# Patient Record
Sex: FEMALE | Race: BLACK OR AFRICAN AMERICAN | NOT HISPANIC OR LATINO | Employment: FULL TIME | URBAN - METROPOLITAN AREA
[De-identification: names, ages, dates, MRNs, and addresses within clinical notes are randomized per-mention and may not be internally consistent; named-entity substitution may affect disease eponyms.]

---

## 2017-01-09 ENCOUNTER — TELEPHONE (OUTPATIENT)
Dept: OPHTHALMOLOGY | Facility: CLINIC | Age: 37
End: 2017-01-09

## 2017-01-12 ENCOUNTER — ANESTHESIA (OUTPATIENT)
Dept: SURGERY | Facility: OTHER | Age: 37
End: 2017-01-12
Payer: COMMERCIAL

## 2017-01-12 ENCOUNTER — ANESTHESIA EVENT (OUTPATIENT)
Dept: SURGERY | Facility: OTHER | Age: 37
End: 2017-01-12
Payer: COMMERCIAL

## 2017-01-12 PROCEDURE — 25000003 PHARM REV CODE 250: Performed by: ANESTHESIOLOGY

## 2017-01-12 PROCEDURE — 25000003 PHARM REV CODE 250: Performed by: NURSE ANESTHETIST, CERTIFIED REGISTERED

## 2017-01-12 PROCEDURE — 63600175 PHARM REV CODE 636 W HCPCS: Performed by: NURSE ANESTHETIST, CERTIFIED REGISTERED

## 2017-01-12 RX ORDER — NEOSTIGMINE METHYLSULFATE 1 MG/ML
INJECTION, SOLUTION INTRAVENOUS
Status: DISCONTINUED | OUTPATIENT
Start: 2017-01-12 | End: 2017-01-12

## 2017-01-12 RX ORDER — GLYCOPYRROLATE 0.2 MG/ML
INJECTION INTRAMUSCULAR; INTRAVENOUS
Status: DISCONTINUED | OUTPATIENT
Start: 2017-01-12 | End: 2017-01-12

## 2017-01-12 RX ORDER — ROCURONIUM BROMIDE 10 MG/ML
INJECTION, SOLUTION INTRAVENOUS
Status: DISCONTINUED | OUTPATIENT
Start: 2017-01-12 | End: 2017-01-12

## 2017-01-12 RX ORDER — MIDAZOLAM HYDROCHLORIDE 1 MG/ML
INJECTION INTRAMUSCULAR; INTRAVENOUS
Status: DISCONTINUED | OUTPATIENT
Start: 2017-01-12 | End: 2017-01-12

## 2017-01-12 RX ORDER — LIDOCAINE HCL/PF 100 MG/5ML
SYRINGE (ML) INTRAVENOUS
Status: DISCONTINUED | OUTPATIENT
Start: 2017-01-12 | End: 2017-01-12

## 2017-01-12 RX ORDER — ONDANSETRON 2 MG/ML
INJECTION INTRAMUSCULAR; INTRAVENOUS
Status: DISCONTINUED | OUTPATIENT
Start: 2017-01-12 | End: 2017-01-12

## 2017-01-12 RX ORDER — FENTANYL CITRATE 50 UG/ML
INJECTION, SOLUTION INTRAMUSCULAR; INTRAVENOUS
Status: DISCONTINUED | OUTPATIENT
Start: 2017-01-12 | End: 2017-01-12

## 2017-01-12 RX ORDER — SODIUM CHLORIDE, SODIUM LACTATE, POTASSIUM CHLORIDE, CALCIUM CHLORIDE 600; 310; 30; 20 MG/100ML; MG/100ML; MG/100ML; MG/100ML
INJECTION, SOLUTION INTRAVENOUS CONTINUOUS PRN
Status: DISCONTINUED | OUTPATIENT
Start: 2017-01-12 | End: 2017-01-12

## 2017-01-12 RX ADMIN — SODIUM CHLORIDE, SODIUM LACTATE, POTASSIUM CHLORIDE, AND CALCIUM CHLORIDE: 600; 310; 30; 20 INJECTION, SOLUTION INTRAVENOUS at 08:01

## 2017-01-12 RX ADMIN — FENTANYL CITRATE 100 MCG: 50 INJECTION, SOLUTION INTRAMUSCULAR; INTRAVENOUS at 09:01

## 2017-01-12 RX ADMIN — NEOSTIGMINE METHYLSULFATE 5 MG: 1 INJECTION INTRAVENOUS at 09:01

## 2017-01-12 RX ADMIN — CARBOXYMETHYLCELLULOSE SODIUM 2 DROP: 2.5 SOLUTION/ DROPS OPHTHALMIC at 09:01

## 2017-01-12 RX ADMIN — LIDOCAINE HYDROCHLORIDE 75 MG: 20 INJECTION, SOLUTION INTRAVENOUS at 09:01

## 2017-01-12 RX ADMIN — ONDANSETRON 4 MG: 2 INJECTION INTRAMUSCULAR; INTRAVENOUS at 09:01

## 2017-01-12 RX ADMIN — MIDAZOLAM HYDROCHLORIDE 2 MG: 1 INJECTION, SOLUTION INTRAMUSCULAR; INTRAVENOUS at 09:01

## 2017-01-12 RX ADMIN — GLYCOPYRROLATE 0.8 MG: 0.2 INJECTION, SOLUTION INTRAMUSCULAR; INTRAVENOUS at 09:01

## 2017-01-12 RX ADMIN — ROCURONIUM BROMIDE 40 MG: 10 INJECTION, SOLUTION INTRAVENOUS at 09:01

## 2017-01-12 NOTE — ANESTHESIA PREPROCEDURE EVALUATION
01/12/2017  Zaida Chung is a 36 y.o., female.    [facility] Anesthesia Evaluation    I have reviewed the Patient Summary Reports.    I have reviewed the Nursing Notes.   I have reviewed the Medications.     Review of Systems  Anesthesia Hx:  History of prior surgery of interest to airway management or planning: Previous anesthesia: General 1/16 corneal tx with general anesthesia.  Denies Family Hx of Anesthesia complications.   Denies Personal Hx of Anesthesia complications.   Social:  Non-Smoker    Hematology/Oncology:  Hematology Normal   Oncology Normal     EENT/Dental:EENT/Dental Normal   Cardiovascular:  Cardiovascular Normal     Pulmonary:  Pulmonary Normal    Renal/:  Renal/ Normal     Hepatic/GI:   GERD 90# weight loss following bariatric surgery 2014   Musculoskeletal:  Musculoskeletal Normal    OB/GYN/PEDS:  Polycystic ovarian syndrome   Neurological:  Neurology Normal    Endocrine:  Endocrine Normal    Dermatological:  Skin Normal    Psych:  Psychiatric Normal           Physical Exam  General:  Morbid Obesity    Airway/Jaw/Neck:  Airway Findings: Mouth Opening: Normal Mallampati: II  TM Distance: Normal, at least 6 cm      Dental:  Dental Findings: In tact        Mental Status:  Mental Status Findings:  Cooperative, Alert and Oriented         Anesthesia Plan  Type of Anesthesia, risks & benefits discussed:  Anesthesia Type:  general  Patient's Preference: GA  Intra-op Monitoring Plan: standard ASA monitors  Intra-op Monitoring Plan Comments:   Post Op Pain Control Plan:   Post Op Pain Control Plan Comments:   Induction:    Beta Blocker:         Informed Consent: Patient understands risks and agrees with Anesthesia plan.  Questions answered. Anesthesia consent signed with patient.  ASA Score: 3     Day of Surgery Review of History & Physical:    H&P update referred to the surgeon.          Ready For Surgery From Anesthesia Perspective.

## 2017-01-12 NOTE — TRANSFER OF CARE
"Anesthesia Transfer of Care Note    Patient: Zaida Chung    Procedure(s) Performed: Procedure(s) (LRB):  TRANSPLANT-CORNEA (Right)    Patient location: PACU    Anesthesia Type: general    Transport from OR: Transported from OR on 2-3 L/min O2 by NC with adequate spontaneous ventilation    Post pain: adequate analgesia    Post assessment: no apparent anesthetic complications    Post vital signs: stable    Level of consciousness: awake, alert and oriented    Nausea/Vomiting: no nausea/vomiting    Complications: none          Last vitals:   Visit Vitals    BP (!) 147/86 (BP Location: Right arm, Patient Position: Sitting, BP Method: Automatic)    Pulse 64    Temp 37.2 °C (99 °F) (Oral)    Resp 18    Ht 5' 2" (1.575 m)    Wt 104.3 kg (230 lb)    LMP 12/15/2016    SpO2 96%    Breastfeeding No    BMI 42.07 kg/m2     "

## 2017-01-12 NOTE — ANESTHESIA POSTPROCEDURE EVALUATION
"Anesthesia Post Evaluation    Patient: Zaida Chung    Procedure(s) Performed: Procedure(s) (LRB):  TRANSPLANT-CORNEA (Right)    Final Anesthesia Type: general  Patient location during evaluation: PACU  Patient participation: Yes- Able to Participate  Level of consciousness: awake and alert  Post-procedure vital signs: reviewed and stable  Pain management: adequate  Airway patency: patent  PONV status at discharge: No PONV  Anesthetic complications: no      Cardiovascular status: blood pressure returned to baseline  Respiratory status: unassisted, spontaneous ventilation and room air  Hydration status: euvolemic  Follow-up not needed.        Visit Vitals    BP (!) 149/74 (BP Location: Right arm, Patient Position: Sitting, BP Method: Automatic)    Pulse (!) 59    Temp 36.8 °C (98.2 °F) (Oral)    Resp 16    Ht 5' 2" (1.575 m)    Wt 104.3 kg (230 lb)    LMP 12/15/2016    SpO2 96%    Breastfeeding No    BMI 42.07 kg/m2       Pain/Edgard Score: Pain Assessment Performed: Yes (1/12/2017 11:00 AM)  Presence of Pain: non-verbal indicators absent (1/12/2017 11:00 AM)  Pain Rating Prior to Med Admin: 4 (1/12/2017 10:26 AM)  Pain Rating Post Med Admin: 4 (1/12/2017 10:46 AM)  Edgard Score: 9 (1/12/2017 10:17 AM)  Modified Edgard Score: 18 (1/12/2017 10:33 AM)      "

## 2017-01-13 ENCOUNTER — OFFICE VISIT (OUTPATIENT)
Dept: OPHTHALMOLOGY | Facility: CLINIC | Age: 37
End: 2017-01-13
Payer: COMMERCIAL

## 2017-01-13 DIAGNOSIS — Z94.7 STATUS POST CORNEAL TRANSPLANT: ICD-10-CM

## 2017-01-13 DIAGNOSIS — Z98.890 POST-OPERATIVE STATE: Primary | ICD-10-CM

## 2017-01-13 PROCEDURE — 99999 PR PBB SHADOW E&M-EST. PATIENT-LVL II: CPT | Mod: PBBFAC,,, | Performed by: OPHTHALMOLOGY

## 2017-01-13 PROCEDURE — 99024 POSTOP FOLLOW-UP VISIT: CPT | Mod: S$GLB,,, | Performed by: OPHTHALMOLOGY

## 2017-01-13 NOTE — PROGRESS NOTES
HPI     Post-op Evaluation    Additional comments: POD 1 PKP OD           Comments   POD 1 PKP OD January 12, 2017    Patient presented to clinic with OD patched.  Patched removed at 8:30 AM.    Patch removal tolerated well.    No eye meds at this time; but patient has medications and is ready to   start regimen         Last edited by Kate Kelley on 1/13/2017  8:43 AM. (History)            Assessment /Plan     For exam results, see Encounter Report.    Post-operative state    Status post corneal transplant      POD1 PKP: Wound stable. Graft with normal edema, folds. Post operative precautions reviewed.

## 2017-01-13 NOTE — MR AVS SNAPSHOT
[facility] - Ophthalmology  [address]  [facility] [address]  Phone: [phone]  Fax: [phone]                  Zaida Chung   2017 8:15 AM   Office Visit    Description:  Female : 1980   Provider:  Elvia Sharp MD   Department:  [facility] - Ophthalmology           Reason for Visit     Post-op Evaluation           Diagnoses this Visit        Comments    Post-operative state    -  Primary     Status post corneal transplant                To Do List           Future Appointments        Provider Department Dept Phone    2017 9:45 AM Elvia Sharp MD [facility] Ophthalmology [phone]      Goals (5 Years of Data)     None      [facility] Line -  Assistance  Registered nurses in the [facility] provide clinical advisement, health education, appointment booking, and other advisory services.  Call for this free service at [phone].             Medications           Message regarding Medications     Verify the changes and/or additions to your medication regime listed below are the same as discussed with your clinician today.  If any of these changes or additions are incorrect, please notify your healthcare provider.             Verify that the below list of medications is an accurate representation of the medications you are currently taking.  If none reported, the list may be blank. If incorrect, please contact your healthcare provider. Carry this list with you in case of emergency.           Current Medications     famotidine (PEPCID) 10 MG tablet Take 10 mg by mouth 2 (two) times daily.    multivitamin capsule Take 1 capsule by mouth once daily.    prednisoLONE acetate (PRED FORTE) 1 % DrpS Place 1 drop into the left eye 4 (four) times daily.           Clinical Reference Information           Vital Signs - Last Recorded     LMP                   12/15/2016           Allergies as of 2017     Penicillins       Immunizations Administered on Date of Encounter - 1/13/2017     None

## 2017-01-20 ENCOUNTER — OFFICE VISIT (OUTPATIENT)
Dept: OPHTHALMOLOGY | Facility: CLINIC | Age: 37
End: 2017-01-20
Payer: COMMERCIAL

## 2017-01-20 DIAGNOSIS — Z94.7 STATUS POST CORNEAL TRANSPLANT: Primary | ICD-10-CM

## 2017-01-20 PROCEDURE — 99999 PR PBB SHADOW E&M-EST. PATIENT-LVL II: CPT | Mod: PBBFAC,,, | Performed by: OPHTHALMOLOGY

## 2017-01-20 PROCEDURE — 99024 POSTOP FOLLOW-UP VISIT: CPT | Mod: S$GLB,,, | Performed by: OPHTHALMOLOGY

## 2017-01-20 RX ORDER — MOXIFLOXACIN 5 MG/ML
1 SOLUTION/ DROPS OPHTHALMIC 3 TIMES DAILY
COMMUNITY
End: 2017-02-17 | Stop reason: ALTCHOICE

## 2017-01-20 NOTE — PROGRESS NOTES
HPI     Post-op Evaluation    Additional comments: cornea transplant od           Comments   PT PRESENTS FOR 1 WEEK POST OP K TRANSPLANT VISIT    PT STATES: doing well since sx, no pain, c/o light sensitivity   PATIENT DENIES FLASHES, FLOATERS, PAIN OR DIPLOPIA   PT GTTS: VIGAMOX TID-QID, PRED-FORTE TID-QID     PATIENT'S LAST DFE WAS 10/29/2015       Last edited by Coretta Louis, PCT on 1/20/2017 10:00 AM.   (History)            Assessment /Plan     For exam results, see Encounter Report.    Status post corneal transplant      PKP stable and clear. Signs and symptoms of graft rejection reviewed.  1 week

## 2017-01-20 NOTE — MR AVS SNAPSHOT
[facility] - [facility]  [address]  [facility] [address]  Phone: [phone]  Fax: [phone]                  Zaida Chung   2017 9:45 AM   Office Visit    Description:  Female : 1980   Provider:  Elvia Sharp MD   Department:  [facility]           Reason for Visit     Post-op Evaluation           Diagnoses this Visit        Comments    Status post corneal transplant    -  Primary            To Do List           Goals (5 Years of Data)     None      [facility]     [facility] Line -  Assistance  Registered nurses in the [facility] On Call Center provide clinical advisement, health education, appointment booking, and other advisory services.  Call for this free service at [phone].             Medications           Message regarding Medications     Verify the changes and/or additions to your medication regime listed below are the same as discussed with your clinician today.  If any of these changes or additions are incorrect, please notify your healthcare provider.             Verify that the below list of medications is an accurate representation of the medications you are currently taking.  If none reported, the list may be blank. If incorrect, please contact your healthcare provider. Carry this list with you in case of emergency.           Current Medications     famotidine (PEPCID) 10 MG tablet Take 10 mg by mouth 2 (two) times daily.    moxifloxacin (VIGAMOX) 0.5 % ophthalmic solution Place 1 drop into the right eye 3 (three) times daily.    multivitamin capsule Take 1 capsule by mouth once daily.    prednisoLONE acetate (PRED FORTE) 1 % DrpS Place 1 drop into the left eye 4 (four) times daily.           Clinical Reference Information           Vital Signs - Last Recorded     LMP                   12/15/2016           Allergies as of 2017     Penicillins      Immunizations Administered on Date of Encounter - 2017      None

## 2017-02-17 ENCOUNTER — OFFICE VISIT (OUTPATIENT)
Dept: OPHTHALMOLOGY | Facility: CLINIC | Age: 37
End: 2017-02-17
Payer: COMMERCIAL

## 2017-02-17 DIAGNOSIS — H18.603 KERATOCONUS, BILATERAL: Primary | ICD-10-CM

## 2017-02-17 DIAGNOSIS — Z94.7 STATUS POST CORNEAL TRANSPLANT: ICD-10-CM

## 2017-02-17 PROCEDURE — 99024 POSTOP FOLLOW-UP VISIT: CPT | Mod: S$GLB,,, | Performed by: OPHTHALMOLOGY

## 2017-02-17 PROCEDURE — 99999 PR PBB SHADOW E&M-EST. PATIENT-LVL II: CPT | Mod: PBBFAC,,, | Performed by: OPHTHALMOLOGY

## 2017-02-17 NOTE — PROGRESS NOTES
HPI     S/P PKP OD January 12, 2017    Patient states she has been doing well since her last visit.    Eye meds:  PF 3-4 x day           Last edited by Kate Kelley on 2/17/2017 11:26 AM.         Assessment /Plan     For exam results, see Encounter Report.    Keratoconus, bilateral    Status post corneal transplant      PKP stable and clear. Signs and symptoms of graft rejection reviewed.  1 month OD    OS PKP stable and clear. Signs and symptoms of graft rejection reviewed.  1 year

## 2017-02-17 NOTE — MR AVS SNAPSHOT
[facility] - Ophthalmology  [address]  [facility] [address]  Phone: [phone]  Fax: [phone]                  Zaida Chung   2017 10:45 AM   Office Visit    Description:  Female : 1980   Provider:  Elvia Sharp MD   Department:  [facility] - Ophthalmology           Reason for Visit     Post-op Evaluation           Diagnoses this Visit        Comments    Keratoconus, bilateral    -  Primary     Status post corneal transplant                To Do List           Goals (5 Years of Data)     None      [facility]     [facility] -  Assistance  Registered nurses in the [facility] On Call Center provide clinical advisement, health education, appointment booking, and other advisory services.  Call for this free service at [phone].             Medications           Message regarding Medications     Verify the changes and/or additions to your medication regime listed below are the same as discussed with your clinician today.  If any of these changes or additions are incorrect, please notify your healthcare provider.        STOP taking these medications     moxifloxacin (VIGAMOX) 0.5 % ophthalmic solution Place 1 drop into the right eye 3 (three) times daily.           Verify that the below list of medications is an accurate representation of the medications you are currently taking.  If none reported, the list may be blank. If incorrect, please contact your healthcare provider. Carry this list with you in case of emergency.           Current Medications     famotidine (PEPCID) 10 MG tablet Take 10 mg by mouth 2 (two) times daily.    multivitamin capsule Take 1 capsule by mouth once daily.    prednisoLONE acetate (PRED FORTE) 1 % DrpS Place 1 drop into the left eye 4 (four) times daily.           Clinical Reference Information           Allergies as of 2017     Penicillins      Immunizations Administered on Date of Encounter - 2017      None      Language Assistance Services     ATTENTION: Language assistance services are available, free of charge. Please call [phone].      ATENCIÓN: Si habla chang, tiene a minor disposición servicios gratuitos de asistencia lingüística. Llame al [phone].     CHÚ Ý: N?u b?n nói Ti?ng Vi?t, có các d?ch v? h? tr? ngôn ng? mi?n phí dành cho b?n. G?i s? [phone].         [facility] complies with applicable Federal civil rights laws and does not discriminate on the basis of race, color, national origin, age, disability, or sex.

## 2017-08-30 ENCOUNTER — OFFICE VISIT (OUTPATIENT)
Dept: OBSTETRICS AND GYNECOLOGY | Facility: CLINIC | Age: 37
End: 2017-08-30
Payer: COMMERCIAL

## 2017-08-30 VITALS
DIASTOLIC BLOOD PRESSURE: 84 MMHG | SYSTOLIC BLOOD PRESSURE: 128 MMHG | WEIGHT: 271.63 LBS | BODY MASS INDEX: 49.68 KG/M2

## 2017-08-30 DIAGNOSIS — Z01.419 ROUTINE GYNECOLOGICAL EXAMINATION: Primary | ICD-10-CM

## 2017-08-30 DIAGNOSIS — E28.2 PCOS (POLYCYSTIC OVARIAN SYNDROME): ICD-10-CM

## 2017-08-30 DIAGNOSIS — N76.0 ACUTE VAGINITIS: ICD-10-CM

## 2017-08-30 DIAGNOSIS — Z80.3 FAMILY HISTORY OF BREAST CANCER: ICD-10-CM

## 2017-08-30 DIAGNOSIS — Z12.4 CERVICAL CANCER SCREENING: ICD-10-CM

## 2017-08-30 DIAGNOSIS — N93.9 ABNORMAL UTERINE BLEEDING (AUB): ICD-10-CM

## 2017-08-30 PROCEDURE — 58100 BIOPSY OF UTERUS LINING: CPT | Mod: S$GLB,,, | Performed by: OBSTETRICS & GYNECOLOGY

## 2017-08-30 PROCEDURE — 87480 CANDIDA DNA DIR PROBE: CPT

## 2017-08-30 PROCEDURE — 88305 TISSUE EXAM BY PATHOLOGIST: CPT | Mod: 26,,, | Performed by: PATHOLOGY

## 2017-08-30 PROCEDURE — 88175 CYTOPATH C/V AUTO FLUID REDO: CPT

## 2017-08-30 PROCEDURE — 99395 PREV VISIT EST AGE 18-39: CPT | Mod: 25,S$GLB,, | Performed by: OBSTETRICS & GYNECOLOGY

## 2017-08-30 PROCEDURE — 88305 TISSUE EXAM BY PATHOLOGIST: CPT | Performed by: PATHOLOGY

## 2017-08-30 PROCEDURE — 99999 PR PBB SHADOW E&M-EST. PATIENT-LVL III: CPT | Mod: PBBFAC,,, | Performed by: OBSTETRICS & GYNECOLOGY

## 2017-08-30 PROCEDURE — 87624 HPV HI-RISK TYP POOLED RSLT: CPT

## 2017-08-30 PROCEDURE — 87660 TRICHOMONAS VAGIN DIR PROBE: CPT

## 2017-08-30 RX ORDER — METRONIDAZOLE 500 MG/1
TABLET ORAL
Qty: 30 TABLET | Refills: 1 | Status: SHIPPED | OUTPATIENT
Start: 2017-08-30 | End: 2018-05-04

## 2017-08-30 NOTE — PROGRESS NOTES
37 yo  female who presents for well woman exam. Patient has known history of PCOS.  She is s/p gastric sleeve surgery a few years ago. Has lost a lot of weight.  Recently  in 2015.  Sexual partner is female.    Patient and her partner desire fertility. They have started care with [facility].  Patient instructed that she would need EMS today (given abnormal menstrual cycles).  She was encouraged by [facility] to have HSG to check for tubal patency.  Patient and partner are considering IUI right now.  Reports recent STD testing showed positive trichomonas (possibly from urine sample). All other testing was negative.    Patient concerned that she will continue to have irregular cycles and be unable to conceive without using clomid.   Reports strong family h/o breast cancer.         ROS:  GENERAL: Denies weight gain or weight loss. Feeling well overall.   SKIN: Denies rash or lesions.   HEAD: Denies head injury or headache.   CHEST: Denies chest pain or shortness of breath.   CARDIOVASCULAR: Denies palpitations or left sided chest pain.   ABDOMEN: No abdominal pain, constipation, diarrhea, nausea, vomiting or rectal bleeding.   URINARY: No frequency, dysuria, hematuria, or burning on urination.  REPRODUCTIVE: See HPI.   BREASTS:  denies pain, lumps, or nipple discharge.   HEMATOLOGIC: No easy bruisability or excessive bleeding.   MUSCULOSKELETAL: Denies joint pain or swelling.   NEUROLOGIC: Denies syncope or weakness.   PSYCHIATRIC: Denies depression, anxiety or mood swings.         PE:   Vitals: /84   Wt 123.2 kg (271 lb 9.7 oz)   LMP 2017 (Exact Date)   BMI 49.68 kg/m²   APPEARANCE: Well nourished, well developed, in no acute distress.  SKIN: Normal skin turgor, no lesions.  CHEST: Lungs clear to auscultation.  HEART: Regular rate and rhythm, no murmurs, rubs or gallops.  ABDOMEN: Soft. No tenderness or masses. No hepatosplenomegaly. No hernias. Obese.  BREASTS: Very large breasts;  Symmetrical, no skin changes or visible lesions. No palpable masses, nipple discharge or adenopathy bilaterally.  PELVIC: Normal external female genitalia without lesions. Normal hair distribution. Adequate perineal body, normal urethral meatus. Vagina moist and well rugated without lesions or discharge. Cervix pink and without lesions. No significant cystocele or rectocele. Bimanual exam showed uterus normal size, shape, position, mobile and nontender. Adnexa without masses or tenderness. Urethra and bladder normal.  RECTAL: Rectovaginal exam confirms above with normal sphincter tone, no masses, and heme negative.  EXTREMITIES: No clubbing cyanosis or edema.    Date:2017  Time:4:06 PM  Name of the procedure: Endometrial Biopsy  Indications: Zaida Chung is a 36 y.o. female  who presents today for endometrial biopsy secondary to abnormal uterine bleeding  Patient's last menstrual period was 2017 (exact date)..    Patient consent: Risks/benefits of the procedure were discussed with the patient. Patient's questions were answered.  Consents signed.   TIME OUT completed.  Labs:  Pap collected  Procedure: Speculum placed in vagina; Pap collected;  AFFIRM collected; cervix swabbed with betadine x 2;. Endometrial pipelle advanced without difficulty x 3 passes; uterus sounded to 10 cm.  Hemostasis achieved.  Complications: none      AP  Routine gyn  -s/p normal breast exam: mammogram ordered (given family h/o breast cancer)  -s/p normal pelvic exam:   -Pap and HPV: ordered  -STD testing: affirm collected; rx for flagyl provided to patient and partner  -infertility: order for HSG ordered  -AUB: endometrial biopsy completed    Patient will likely need records sent to [facility] once they are made available    Encouraged to contact my office if she does not get a cycle. May need rx for ruddy Dumont MD

## 2017-08-31 LAB
CANDIDA RRNA VAG QL PROBE: NEGATIVE
G VAGINALIS RRNA GENITAL QL PROBE: POSITIVE
T VAGINALIS RRNA GENITAL QL PROBE: POSITIVE

## 2017-09-02 ENCOUNTER — HOSPITAL ENCOUNTER (OUTPATIENT)
Dept: RADIOLOGY | Facility: HOSPITAL | Age: 37
Discharge: HOME | End: 2017-09-02
Attending: OBSTETRICS & GYNECOLOGY
Payer: COMMERCIAL

## 2017-09-02 VITALS — WEIGHT: 271 LBS | HEIGHT: 62 IN | BODY MASS INDEX: 49.87 KG/M2

## 2017-09-02 DIAGNOSIS — Z80.3 FAMILY HISTORY OF BREAST CANCER: ICD-10-CM

## 2017-09-02 PROCEDURE — 77063 BREAST TOMOSYNTHESIS BI: CPT | Mod: 26,,, | Performed by: RADIOLOGY

## 2017-09-02 PROCEDURE — 77067 SCR MAMMO BI INCL CAD: CPT | Mod: TC

## 2017-09-02 PROCEDURE — 77067 SCR MAMMO BI INCL CAD: CPT | Mod: 26,,, | Performed by: RADIOLOGY

## 2017-09-05 ENCOUNTER — TELEPHONE (OUTPATIENT)
Dept: RADIOLOGY | Facility: HOSPITAL | Age: 37
End: 2017-09-05

## 2017-09-07 LAB
HPV HR 12 DNA CVX QL NAA+PROBE: NEGATIVE
HPV16 DNA SPEC QL NAA+PROBE: NEGATIVE
HPV18 DNA SPEC QL NAA+PROBE: NEGATIVE

## 2017-09-13 ENCOUNTER — HOSPITAL ENCOUNTER (OUTPATIENT)
Dept: RADIOLOGY | Facility: HOSPITAL | Age: 37
Discharge: HOME | End: 2017-09-13
Attending: OBSTETRICS & GYNECOLOGY
Payer: COMMERCIAL

## 2017-09-13 ENCOUNTER — TELEPHONE (OUTPATIENT)
Dept: OBSTETRICS AND GYNECOLOGY | Facility: CLINIC | Age: 37
End: 2017-09-13

## 2017-09-13 DIAGNOSIS — R92.8 ABNORMAL MAMMOGRAM: ICD-10-CM

## 2017-09-13 PROCEDURE — 77061 BREAST TOMOSYNTHESIS UNI: CPT | Mod: 26,,, | Performed by: RADIOLOGY

## 2017-09-13 PROCEDURE — 77065 DX MAMMO INCL CAD UNI: CPT | Mod: 26,,, | Performed by: RADIOLOGY

## 2017-09-13 PROCEDURE — 77061 BREAST TOMOSYNTHESIS UNI: CPT | Mod: TC

## 2017-09-13 NOTE — TELEPHONE ENCOUNTER
[facility] called requesting a new order for her HSG test, because of her incorrect spelling on her name/Ins. Cards.

## 2017-09-13 NOTE — TELEPHONE ENCOUNTER
Pt. Notified her pap and HPV normal, her EMB was also normal , no cancer cells detected.  She said thank you so much for calling.

## 2017-12-06 ENCOUNTER — OFFICE VISIT (OUTPATIENT)
Dept: OPTOMETRY | Facility: CLINIC | Age: 37
End: 2017-12-06
Payer: COMMERCIAL

## 2017-12-06 DIAGNOSIS — Z94.7 CORNEA REPLACED BY TRANSPLANT: ICD-10-CM

## 2017-12-06 DIAGNOSIS — H18.603 KERATOCONUS OF BOTH EYES: ICD-10-CM

## 2017-12-06 DIAGNOSIS — T86.8409 REJECTION OF CORNEAL GRAFT: Primary | ICD-10-CM

## 2017-12-06 PROCEDURE — 67515 INJECT/TREAT EYE SOCKET: CPT | Mod: LT,S$GLB,, | Performed by: OPHTHALMOLOGY

## 2017-12-06 PROCEDURE — 99999 PR PBB SHADOW E&M-EST. PATIENT-LVL II: CPT | Mod: PBBFAC,,, | Performed by: OPTOMETRIST

## 2017-12-06 PROCEDURE — 92012 INTRM OPH EXAM EST PATIENT: CPT | Mod: S$GLB,,, | Performed by: OPTOMETRIST

## 2017-12-06 PROCEDURE — 92012 INTRM OPH EXAM EST PATIENT: CPT | Mod: 25,S$GLB,, | Performed by: OPHTHALMOLOGY

## 2017-12-06 RX ORDER — PREDNISOLONE ACETATE 10 MG/ML
1 SUSPENSION/ DROPS OPHTHALMIC 4 TIMES DAILY
Qty: 10 ML | Refills: 3 | Status: ON HOLD | OUTPATIENT
Start: 2017-12-06 | End: 2018-05-10 | Stop reason: SDUPTHER

## 2017-12-06 NOTE — PROGRESS NOTES
HPI      is here today for possible infection OS, pt sts lens is   extremely Cloudy OS pt vision is extremely blurry  With discharge   (-)Flashes (-)Floaters  (+)Itch, (+)tear, (+)burn, (-)Dryness. (+) OTC Drops steriods and mem 128  (-)Photophobia  (-)Glare (-)diplopia (-) headaches          Last edited by Felisa Castillo PCT on 12/6/2017 11:01 AM. (History)            Assessment /Plan     For exam results, see Encounter Report.    Rejection of corneal graft    Cornea replaced by transplant    Keratoconus of both eyes      Rejection episode #2   PKP in 1/2016, rejection 10/2016 and now 12/2017  Kenalog inj today, PF q2hrs    OD PKP stable and clear, PF qid.

## 2018-01-03 ENCOUNTER — OFFICE VISIT (OUTPATIENT)
Dept: OPHTHALMOLOGY | Facility: CLINIC | Age: 38
End: 2018-01-03
Payer: COMMERCIAL

## 2018-01-03 DIAGNOSIS — T86.8409 REJECTION OF CORNEAL GRAFT: Primary | ICD-10-CM

## 2018-01-03 PROCEDURE — 99999 PR PBB SHADOW E&M-EST. PATIENT-LVL II: CPT | Mod: PBBFAC,,, | Performed by: OPHTHALMOLOGY

## 2018-01-03 PROCEDURE — 92014 COMPRE OPH EXAM EST PT 1/>: CPT | Mod: S$GLB,,, | Performed by: OPHTHALMOLOGY

## 2018-01-03 NOTE — PROGRESS NOTES
HPI     DLS 12/6/17    Pt states she has had some improvement in her VA since the injection.  No   pain.    Eye meds:  PF Q 2 hrs    Last edited by Shyanne De Anda on 1/3/2018  3:46 PM. (History)            Assessment /Plan     For exam results, see Encounter Report.    Rejection of corneal graft      Rejection episode #2   PKP in 1/2016, rejection 10/2016 and now 12/2017  Kenalog inj 1 month ago, PF q2hrs  Much improved, cont PF q2-4hrs  IOP     OD PKP stable and clear, PF qid.

## 2018-02-09 ENCOUNTER — OFFICE VISIT (OUTPATIENT)
Dept: OPHTHALMOLOGY | Facility: CLINIC | Age: 38
End: 2018-02-09
Payer: COMMERCIAL

## 2018-02-09 DIAGNOSIS — H18.603 KERATOCONUS, BILATERAL: ICD-10-CM

## 2018-02-09 DIAGNOSIS — T86.8409 REJECTION OF CORNEAL GRAFT: Primary | ICD-10-CM

## 2018-02-09 PROCEDURE — 92014 COMPRE OPH EXAM EST PT 1/>: CPT | Mod: S$GLB,,, | Performed by: OPHTHALMOLOGY

## 2018-02-09 PROCEDURE — 99999 PR PBB SHADOW E&M-EST. PATIENT-LVL II: CPT | Mod: PBBFAC,,, | Performed by: OPHTHALMOLOGY

## 2018-02-09 RX ORDER — TIMOLOL MALEATE 5 MG/ML
1 SOLUTION/ DROPS OPHTHALMIC 2 TIMES DAILY
Qty: 10 ML | Refills: 6 | Status: ON HOLD | OUTPATIENT
Start: 2018-02-09 | End: 2018-05-10

## 2018-02-09 NOTE — PROGRESS NOTES
HPI     Patient is here today for a follow-up PKP OS 1/12/2017 reinjection on   1/8/2018.  Patient states she is doing well, still some cloudy vision OS   PF OU QID    Last edited by Tatiana Sadler, PCT on 2/9/2018 10:04 AM. (History)            Assessment /Plan     For exam results, see Encounter Report.    Rejection of corneal graft    Keratoconus, bilateral      Rejection episode #2   PKP in 1/2016, rejection 10/2016 and now 12/2017  Kenalog inj 2 months ago, PF q2hrs  Much improved, but now with persistent K edema in graft.  IOP high today, so start timolol  Plan DSEK OS  Under PKP  Fertility considerations. Can be done with MAC and RBB     OD PKP stable and clear, PF qid.

## 2018-02-21 ENCOUNTER — TELEPHONE (OUTPATIENT)
Dept: OPHTHALMOLOGY | Facility: CLINIC | Age: 38
End: 2018-02-21

## 2018-04-02 ENCOUNTER — TELEPHONE (OUTPATIENT)
Dept: OPHTHALMOLOGY | Facility: CLINIC | Age: 38
End: 2018-04-02

## 2018-04-02 DIAGNOSIS — T86.8409 CORNEAL GRAFT REJECTION: Primary | ICD-10-CM

## 2018-05-03 DIAGNOSIS — Z94.7 STATUS POST CORNEAL TRANSPLANT: ICD-10-CM

## 2018-05-03 RX ORDER — OFLOXACIN 3 MG/ML
1 SOLUTION/ DROPS OPHTHALMIC 4 TIMES DAILY
Qty: 5 ML | Refills: 0 | Status: SHIPPED | OUTPATIENT
Start: 2018-05-03 | End: 2018-05-13

## 2018-05-03 RX ORDER — PREDNISOLONE ACETATE 10 MG/ML
1 SUSPENSION/ DROPS OPHTHALMIC 4 TIMES DAILY
Qty: 10 ML | Refills: 4 | Status: SHIPPED | OUTPATIENT
Start: 2018-05-03 | End: 2018-11-07

## 2018-05-07 ENCOUNTER — TELEPHONE (OUTPATIENT)
Dept: OPHTHALMOLOGY | Facility: CLINIC | Age: 38
End: 2018-05-07

## 2018-05-10 ENCOUNTER — SURGERY (OUTPATIENT)
Age: 38
End: 2018-05-10

## 2018-05-10 ENCOUNTER — ANESTHESIA (OUTPATIENT)
Dept: SURGERY | Facility: OTHER | Age: 38
End: 2018-05-10
Payer: COMMERCIAL

## 2018-05-10 ENCOUNTER — ANESTHESIA EVENT (OUTPATIENT)
Dept: SURGERY | Facility: OTHER | Age: 38
End: 2018-05-10
Payer: COMMERCIAL

## 2018-05-10 ENCOUNTER — HOSPITAL ENCOUNTER (OUTPATIENT)
Facility: OTHER | Age: 38
Discharge: HOME | End: 2018-05-10
Attending: OPHTHALMOLOGY | Admitting: OPHTHALMOLOGY
Payer: COMMERCIAL

## 2018-05-10 VITALS
HEIGHT: 63 IN | DIASTOLIC BLOOD PRESSURE: 75 MMHG | WEIGHT: 260 LBS | HEART RATE: 60 BPM | TEMPERATURE: 98 F | BODY MASS INDEX: 46.07 KG/M2 | SYSTOLIC BLOOD PRESSURE: 134 MMHG | OXYGEN SATURATION: 99 % | RESPIRATION RATE: 16 BRPM

## 2018-05-10 DIAGNOSIS — T86.8409 CORNEAL GRAFT REJECTION: ICD-10-CM

## 2018-05-10 DIAGNOSIS — Z94.7 HISTORY OF CORNEAL TRANSPLANT: Primary | ICD-10-CM

## 2018-05-10 DIAGNOSIS — H18.603 KERATOCONUS OF BOTH EYES: ICD-10-CM

## 2018-05-10 LAB
B-HCG UR QL: NEGATIVE
CTP QC/QA: YES

## 2018-05-10 PROCEDURE — 37000008 HC ANESTHESIA 1ST 15 MINUTES: Performed by: OPHTHALMOLOGY

## 2018-05-10 PROCEDURE — 81025 URINE PREGNANCY TEST: CPT | Performed by: OPHTHALMOLOGY

## 2018-05-10 PROCEDURE — S0020 INJECTION, BUPIVICAINE HYDRO: HCPCS | Performed by: OPHTHALMOLOGY

## 2018-05-10 PROCEDURE — 25000003 PHARM REV CODE 250: Performed by: NURSE ANESTHETIST, CERTIFIED REGISTERED

## 2018-05-10 PROCEDURE — 36000706: Performed by: OPHTHALMOLOGY

## 2018-05-10 PROCEDURE — 63600175 PHARM REV CODE 636 W HCPCS: Performed by: OPHTHALMOLOGY

## 2018-05-10 PROCEDURE — 25000003 PHARM REV CODE 250: Performed by: ANESTHESIOLOGY

## 2018-05-10 PROCEDURE — 25000003 PHARM REV CODE 250: Performed by: OPHTHALMOLOGY

## 2018-05-10 PROCEDURE — 37000009 HC ANESTHESIA EA ADD 15 MINS: Performed by: OPHTHALMOLOGY

## 2018-05-10 PROCEDURE — 36000707: Performed by: OPHTHALMOLOGY

## 2018-05-10 PROCEDURE — V2785 CORNEAL TISSUE PROCESSING: HCPCS | Performed by: OPHTHALMOLOGY

## 2018-05-10 PROCEDURE — 65756 CORNEAL TRNSPL ENDOTHELIAL: CPT | Mod: LT,,, | Performed by: OPHTHALMOLOGY

## 2018-05-10 PROCEDURE — 71000016 HC POSTOP RECOV ADDL HR: Performed by: OPHTHALMOLOGY

## 2018-05-10 PROCEDURE — 65757 PREP CORNEAL ENDO ALLOGRAFT: CPT | Mod: ,,, | Performed by: OPHTHALMOLOGY

## 2018-05-10 PROCEDURE — 63600175 PHARM REV CODE 636 W HCPCS: Performed by: NURSE ANESTHETIST, CERTIFIED REGISTERED

## 2018-05-10 PROCEDURE — 71000015 HC POSTOP RECOV 1ST HR: Performed by: OPHTHALMOLOGY

## 2018-05-10 DEVICE — CORNEA TRANSPLANTABLE PRE CUT: Type: IMPLANTABLE DEVICE | Site: EYE | Status: FUNCTIONAL

## 2018-05-10 RX ORDER — SODIUM CHLORIDE 9 MG/ML
INJECTION, SOLUTION INTRAVENOUS CONTINUOUS PRN
Status: DISCONTINUED | OUTPATIENT
Start: 2018-05-10 | End: 2018-05-10

## 2018-05-10 RX ORDER — FENTANYL CITRATE 50 UG/ML
INJECTION, SOLUTION INTRAMUSCULAR; INTRAVENOUS
Status: DISCONTINUED | OUTPATIENT
Start: 2018-05-10 | End: 2018-05-10

## 2018-05-10 RX ORDER — PROPARACAINE HYDROCHLORIDE 5 MG/ML
1 SOLUTION/ DROPS OPHTHALMIC
Status: DISCONTINUED | OUTPATIENT
Start: 2018-05-10 | End: 2018-05-10 | Stop reason: HOSPADM

## 2018-05-10 RX ORDER — MOXIFLOXACIN 5 MG/ML
1 SOLUTION/ DROPS OPHTHALMIC
Status: COMPLETED | OUTPATIENT
Start: 2018-05-10 | End: 2018-05-10

## 2018-05-10 RX ORDER — MOXIFLOXACIN 5 MG/ML
1 SOLUTION/ DROPS OPHTHALMIC
Status: DISCONTINUED | OUTPATIENT
Start: 2018-05-10 | End: 2018-05-10 | Stop reason: HOSPADM

## 2018-05-10 RX ORDER — TETRACAINE HYDROCHLORIDE 5 MG/ML
1 SOLUTION OPHTHALMIC
Status: COMPLETED | OUTPATIENT
Start: 2018-05-10 | End: 2018-05-10

## 2018-05-10 RX ORDER — TRIAMCINOLONE ACETONIDE 40 MG/ML
INJECTION, SUSPENSION INTRA-ARTICULAR; INTRAMUSCULAR
Status: DISCONTINUED | OUTPATIENT
Start: 2018-05-10 | End: 2018-05-10 | Stop reason: HOSPADM

## 2018-05-10 RX ORDER — BUPIVACAINE HYDROCHLORIDE 7.5 MG/ML
INJECTION, SOLUTION EPIDURAL; RETROBULBAR
Status: DISCONTINUED | OUTPATIENT
Start: 2018-05-10 | End: 2018-05-10 | Stop reason: HOSPADM

## 2018-05-10 RX ORDER — LIDOCAINE HYDROCHLORIDE 20 MG/ML
INJECTION, SOLUTION INFILTRATION; PERINEURAL
Status: DISCONTINUED | OUTPATIENT
Start: 2018-05-10 | End: 2018-05-10 | Stop reason: HOSPADM

## 2018-05-10 RX ORDER — MIDAZOLAM HYDROCHLORIDE 1 MG/ML
INJECTION INTRAMUSCULAR; INTRAVENOUS
Status: DISCONTINUED | OUTPATIENT
Start: 2018-05-10 | End: 2018-05-10

## 2018-05-10 RX ORDER — ACETAMINOPHEN 325 MG/1
650 TABLET ORAL EVERY 4 HOURS PRN
Status: DISCONTINUED | OUTPATIENT
Start: 2018-05-10 | End: 2018-05-10 | Stop reason: HOSPADM

## 2018-05-10 RX ORDER — DEXAMETHASONE SODIUM PHOSPHATE 4 MG/ML
INJECTION, SOLUTION INTRA-ARTICULAR; INTRALESIONAL; INTRAMUSCULAR; INTRAVENOUS; SOFT TISSUE
Status: DISCONTINUED | OUTPATIENT
Start: 2018-05-10 | End: 2018-05-10 | Stop reason: HOSPADM

## 2018-05-10 RX ORDER — FAMOTIDINE 20 MG/1
20 TABLET, FILM COATED ORAL ONCE
Status: COMPLETED | OUTPATIENT
Start: 2018-05-10 | End: 2018-05-10

## 2018-05-10 RX ORDER — GENTAMICIN SULFATE 40 MG/ML
INJECTION, SOLUTION INTRAMUSCULAR; INTRAVENOUS
Status: DISCONTINUED | OUTPATIENT
Start: 2018-05-10 | End: 2018-05-10 | Stop reason: HOSPADM

## 2018-05-10 RX ADMIN — MOXIFLOXACIN HYDROCHLORIDE 1 DROP: 5 SOLUTION/ DROPS OPHTHALMIC at 09:05

## 2018-05-10 RX ADMIN — SODIUM CHLORIDE: 0.9 INJECTION, SOLUTION INTRAVENOUS at 10:05

## 2018-05-10 RX ADMIN — TRIAMCINOLONE ACETONIDE 40 MG: 40 INJECTION, SUSPENSION INTRA-ARTICULAR; INTRAMUSCULAR at 11:05

## 2018-05-10 RX ADMIN — HOMATROPINE HYDROBROMIDE 5 DROP: 50 SOLUTION OPHTHALMIC at 11:05

## 2018-05-10 RX ADMIN — BALANCED SALT SOLUTION 500 ML: 6.4; .75; .48; .3; 3.9; 1.7 SOLUTION OPHTHALMIC at 11:05

## 2018-05-10 RX ADMIN — FAMOTIDINE 20 MG: 20 TABLET ORAL at 12:05

## 2018-05-10 RX ADMIN — SODIUM HYALURONATE 10 MG: 10 INJECTION INTRAOCULAR at 11:05

## 2018-05-10 RX ADMIN — BUPIVACAINE HYDROCHLORIDE 5 ML: 7.5 INJECTION, SOLUTION EPIDURAL; RETROBULBAR at 11:05

## 2018-05-10 RX ADMIN — MIDAZOLAM HYDROCHLORIDE 1 MG: 1 INJECTION, SOLUTION INTRAMUSCULAR; INTRAVENOUS at 11:05

## 2018-05-10 RX ADMIN — TETRACAINE HYDROCHLORIDE 1 DROP: 5 SOLUTION OPHTHALMIC at 09:05

## 2018-05-10 RX ADMIN — GENTAMICIN SULFATE 20 MG: 40 INJECTION, SOLUTION INTRAMUSCULAR; INTRAVENOUS at 11:05

## 2018-05-10 RX ADMIN — DEXAMETHASONE SODIUM PHOSPHATE 2 MG: 4 INJECTION, SOLUTION INTRAMUSCULAR; INTRAVENOUS at 11:05

## 2018-05-10 RX ADMIN — LIDOCAINE HYDROCHLORIDE 5 ML: 20 INJECTION, SOLUTION INFILTRATION; PERINEURAL at 11:05

## 2018-05-10 RX ADMIN — HOMATROPINE HYDROBROMIDE 6 DROP: 50 SOLUTION OPHTHALMIC at 11:05

## 2018-05-10 RX ADMIN — FENTANYL CITRATE 100 MCG: 50 INJECTION, SOLUTION INTRAMUSCULAR; INTRAVENOUS at 11:05

## 2018-05-10 RX ADMIN — MIDAZOLAM HYDROCHLORIDE 2 MG: 1 INJECTION, SOLUTION INTRAMUSCULAR; INTRAVENOUS at 10:05

## 2018-05-10 NOTE — OP NOTE
SURGEON:  Elvia Sharp M.D.    PREOPERATIVE DIAGNOSES:  Failed PKP OS  keratoconus    POSTOPERATIVE DIAGNOSES:    Failed PKP OS  keratoconus      PROCEDURES PERFORMED:  Descement's Stripping Endothelial Keratoplasty  left eye (DSEK) (64808)    05/10/2018    ANESTHESIA:  MAC with retrobulbar block.    GRAFT SIZE:  8.0 mm    COMPLICATIONS:  None.    INDICATIONS:    The patient has a history poor vision secondary to corneal edema.  After a thorough discussion of the risks, benefits and alternatives to corneal transplantation using the DSEK technique, the patient voices understanding of the risks and benefits and wishes to proceed with surgery.    PROCEDURE IN DETAIL:    The patient was brought to the operating room in the supine position, where the eye was prepped and draped in standard sterile fashion, after having received a retrobulbar block consisting of a 50/50 mixture of lidocaine and bupivacaine under conscious sedation.  The procedure was begun by the creation of a paracentesis incision, through which viscoelastic was used to fill the anterior chamber.    Next, a 4mm temporal limbal tunnel incision was constructed.  An 8 mm coleen was made in the center of the cornea and then reverse Sinskey used to score Descemet's membrane.  A scraper was then used to remove Descemet's in its entirety and all remaining viscoelastics were removed from the eye.    Attention was then directed to the side table, where the previously microkeratome cut donor tissue was trephined with a Hessburg-Cespedes trephine.  Attention was then redirected to the patient's eye and this corneal transplant tissue was   inserted into the anterior chamber using an injector.  The anterior chamber was reformed with BSS and 10-0 nylon sutures placed in the wound.  The DSEK button was repositioned centrally and air used to fill the anterior chamber.  Ten minutes were allowed to elapse for adherence, and then the air was replaced with BSS except for a small  air bubble.  The patient will remain supine in the postoperative recovery area for one hour to allow better adhesion of the graft to the posterior aspect of the cornea.  The patient  will be seen tomorrow in the eye clinic.

## 2018-05-10 NOTE — ANESTHESIA PREPROCEDURE EVALUATION
05/10/2018  Zaida Chung is a 37 y.o., female.    Pre-op Assessment    I have reviewed the Patient Summary Reports.     I have reviewed the Nursing Notes.   I have reviewed the Medications.     Review of Systems  Anesthesia Hx:  History of prior surgery of interest to airway management or planning: Previous anesthesia: General 1/16 corneal tx with general anesthesia.  Denies Family Hx of Anesthesia complications.   Denies Personal Hx of Anesthesia complications.   Social:  Non-Smoker    Hematology/Oncology:  Hematology Normal   Oncology Normal     EENT/Dental:EENT/Dental Normal   Cardiovascular:  Cardiovascular Normal     Pulmonary:  Pulmonary Normal    Renal/:  Renal/ Normal     Hepatic/GI:   GERD 90# weight loss following bariatric surgery 2014   Musculoskeletal:  Musculoskeletal Normal    OB/GYN/PEDS:  Polycystic ovarian syndrome   Neurological:  Neurology Normal    Endocrine:  Endocrine Normal    Dermatological:  Skin Normal    Psych:  Psychiatric Normal           Physical Exam  General:  Morbid Obesity    Airway/Jaw/Neck:  Airway Findings: Mouth Opening: Normal Mallampati: II  TM Distance: Normal, at least 6 cm      Dental:  Dental Findings: In tact        Mental Status:  Mental Status Findings:  Cooperative, Alert and Oriented         Anesthesia Plan  Type of Anesthesia, risks & benefits discussed:  Anesthesia Type:  general  Patient's Preference: GA  Intra-op Monitoring Plan: standard ASA monitors  Intra-op Monitoring Plan Comments:   Post Op Pain Control Plan:   Post Op Pain Control Plan Comments:   Induction:    Beta Blocker:         Informed Consent: Patient understands risks and agrees with Anesthesia plan.  Questions answered. Anesthesia consent signed with patient.  ASA Score: 3     Day of Surgery Review of History & Physical:    H&P update referred to the surgeon.     Anesthesia  Plan Notes: 2 prior corneal transplants        Ready For Surgery From Anesthesia Perspective.

## 2018-05-10 NOTE — DISCHARGE SUMMARY
Outcome: Successful outpatient ophthalmic surgical procedure  Preprinted Instructions given to patient.  Regular diet.  Activity: No restrictions  Meds: see Med Rec  Condition: stable  Follow up: 1 day with Dr Sharp  Disposition: Home  Diagnosis: s/p eye surgery

## 2018-05-10 NOTE — PLAN OF CARE
Zaida Chung has met all discharge criteria from Phase II. Vital Signs are stable, ambulating  without difficulty. Discharge instructions given, patient verbalized understanding. Discharged from facility via wheelchair in stable condition.

## 2018-05-10 NOTE — ANESTHESIA POSTPROCEDURE EVALUATION
"Anesthesia Post Evaluation    Patient: Zaida Chung    Procedure(s) Performed: Procedure(s) (LRB):  TRANSPLANT-DSAEK CORNEAL (Left)    Final Anesthesia Type: MAC  Patient location during evaluation: [facility]  Patient participation: Yes- Able to Participate  Level of consciousness: awake and alert and oriented  Post-procedure vital signs: reviewed and stable  Airway patency: patent  PONV status at discharge: No PONV  Anesthetic complications: no      Cardiovascular status: hemodynamically stable  Respiratory status: unassisted, spontaneous ventilation and room air  Hydration status: euvolemic  Follow-up not needed.        Visit Vitals  BP (!) 161/82 (BP Location: Right arm, Patient Position: Lying)   Pulse 66   Temp 36.9 °C (98.4 °F) (Oral)   Resp 16   Ht 5' 2.5" (1.588 m)   Wt 117.9 kg (260 lb)   LMP 04/16/2018 (Approximate)   SpO2 99%   Breastfeeding? No   BMI 46.80 kg/m²       Pain/Edgard Score: Pain Assessment Performed: Yes (5/10/2018  9:36 AM)  Presence of Pain: denies (5/10/2018  9:36 AM)      "

## 2018-05-11 ENCOUNTER — OFFICE VISIT (OUTPATIENT)
Dept: OPHTHALMOLOGY | Facility: CLINIC | Age: 38
End: 2018-05-11
Payer: COMMERCIAL

## 2018-05-11 DIAGNOSIS — Z98.890 POST-OPERATIVE STATE: Primary | ICD-10-CM

## 2018-05-11 DIAGNOSIS — Z94.7 STATUS POST CORNEAL TRANSPLANT: ICD-10-CM

## 2018-05-11 PROCEDURE — 99024 POSTOP FOLLOW-UP VISIT: CPT | Mod: S$GLB,,, | Performed by: OPHTHALMOLOGY

## 2018-05-11 PROCEDURE — 99999 PR PBB SHADOW E&M-EST. PATIENT-LVL II: CPT | Mod: PBBFAC,,, | Performed by: OPHTHALMOLOGY

## 2018-05-11 NOTE — PROGRESS NOTES
HPI     POD 1 DSEK OS May 10, 2018    Patient presented to clinic with OS patched.  Patched removed at 11:48 AM.    Patch removal tolerated well.    No eye meds at this time; but patient has medications and is ready to   start regimen        Last edited by Kate Kelley on 5/11/2018 11:49 AM. (History)            Assessment /Plan     For exam results, see Encounter Report.    Post-operative state    Status post corneal transplant      DSEK under PK POD1: DSEK Graft attached. 2+ MCE. Wound stable.

## 2018-05-15 ENCOUNTER — TELEPHONE (OUTPATIENT)
Dept: OPHTHALMOLOGY | Facility: CLINIC | Age: 38
End: 2018-05-15
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
## 2018-05-18 ENCOUNTER — OFFICE VISIT (OUTPATIENT)
Dept: OPHTHALMOLOGY | Facility: CLINIC | Age: 38
End: 2018-05-18
Payer: COMMERCIAL

## 2018-05-18 DIAGNOSIS — Z94.7 STATUS POST CORNEAL TRANSPLANT: Primary | ICD-10-CM

## 2018-05-18 PROCEDURE — 99024 POSTOP FOLLOW-UP VISIT: CPT | Mod: S$GLB,,, | Performed by: OPHTHALMOLOGY

## 2018-05-18 PROCEDURE — 99999 PR PBB SHADOW E&M-EST. PATIENT-LVL II: CPT | Mod: PBBFAC,,, | Performed by: OPHTHALMOLOGY
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
## 2018-06-20 ENCOUNTER — OFFICE VISIT (OUTPATIENT)
Dept: OPHTHALMOLOGY | Facility: CLINIC | Age: 38
End: 2018-06-20
Payer: COMMERCIAL

## 2018-06-20 DIAGNOSIS — T86.8409 CORNEAL GRAFT REJECTION: ICD-10-CM

## 2018-06-20 DIAGNOSIS — H18.603 KERATOCONUS, BILATERAL: ICD-10-CM

## 2018-06-20 DIAGNOSIS — Z94.7 STATUS POST CORNEAL TRANSPLANT: Primary | ICD-10-CM

## 2018-06-20 PROCEDURE — 99024 POSTOP FOLLOW-UP VISIT: CPT | Mod: S$GLB,,, | Performed by: OPHTHALMOLOGY

## 2018-06-20 PROCEDURE — 99999 PR PBB SHADOW E&M-EST. PATIENT-LVL II: CPT | Mod: PBBFAC,,, | Performed by: OPHTHALMOLOGY

## 2018-06-20 NOTE — PROGRESS NOTES
HPI     S/p DSEK OS (5/10/18).  Pt states that vision seems to be stable OU since last visit. Pt denies   any pain or discomfort OU. Pt confirms taking pred QID OS.    Last edited by Jocelyn Petersen on 6/20/2018  3:57 PM. (History)            Assessment /Plan     For exam results, see Encounter Report.    Status post corneal transplant    Keratoconus, bilateral    Corneal graft rejection      DSEK under PKP OS with excellent result.  MRx OS soon.  OD PKP stable and clear. Signs and symptoms of graft rejection reviewed.

## 2018-06-29 ENCOUNTER — OFFICE VISIT (OUTPATIENT)
Dept: OBSTETRICS AND GYNECOLOGY | Facility: CLINIC | Age: 38
End: 2018-06-29
Payer: COMMERCIAL

## 2018-06-29 VITALS
BODY MASS INDEX: 53.02 KG/M2 | WEIGHT: 288.13 LBS | HEIGHT: 62 IN | SYSTOLIC BLOOD PRESSURE: 120 MMHG | DIASTOLIC BLOOD PRESSURE: 70 MMHG

## 2018-06-29 DIAGNOSIS — E28.2 PCOS (POLYCYSTIC OVARIAN SYNDROME): Primary | ICD-10-CM

## 2018-06-29 DIAGNOSIS — N83.202 LEFT OVARIAN CYST: ICD-10-CM

## 2018-06-29 PROCEDURE — 3008F BODY MASS INDEX DOCD: CPT | Mod: CPTII,S$GLB,, | Performed by: OBSTETRICS & GYNECOLOGY

## 2018-06-29 PROCEDURE — 99212 OFFICE O/P EST SF 10 MIN: CPT | Mod: S$GLB,,, | Performed by: OBSTETRICS & GYNECOLOGY

## 2018-06-29 PROCEDURE — 99999 PR PBB SHADOW E&M-EST. PATIENT-LVL II: CPT | Mod: PBBFAC,,, | Performed by: OBSTETRICS & GYNECOLOGY

## 2018-06-29 RX ORDER — METFORMIN HYDROCHLORIDE 500 MG/1
500 TABLET ORAL 2 TIMES DAILY WITH MEALS
Qty: 60 TABLET | Refills: 11 | Status: SHIPPED | OUTPATIENT
Start: 2018-06-29 | End: 2018-11-07

## 2018-06-29 NOTE — PROGRESS NOTES
36 yo female with h/o PCOS who presents to discuss management of LEFT ovarian cyst and PCOS.  She wants to restart metformin. Will also start losing weight.  The patient has been undergoing inseminations and reports having a persistent large LEFT ovarian cyst (at least 6cm).  After stopping stimulation, the cyst decreased to 1.8mm.    The patient and her partner desire fertility.  They have not had success with IUIs and will considering pursuing IVF.    They will contact my office regarding their plans.    rx for metformin sent  Pelvic US to check left ovarian cyst ordered    babak nolen MD

## 2018-10-17 ENCOUNTER — HOSPITAL ENCOUNTER (OUTPATIENT)
Dept: RADIOLOGY | Facility: HOSPITAL | Age: 38
Discharge: HOME | End: 2018-10-17
Attending: OBSTETRICS & GYNECOLOGY
Payer: COMMERCIAL

## 2018-10-17 DIAGNOSIS — E28.9 DYSFUNCTION OVARIAN: ICD-10-CM

## 2018-10-17 DIAGNOSIS — E28.9 DYSFUNCTION OVARIAN: Primary | ICD-10-CM

## 2018-10-17 PROCEDURE — 76856 US EXAM PELVIC COMPLETE: CPT | Mod: TC

## 2018-10-17 PROCEDURE — 76830 TRANSVAGINAL US NON-OB: CPT | Mod: TC

## 2018-10-17 PROCEDURE — 76830 TRANSVAGINAL US NON-OB: CPT | Mod: 26,,, | Performed by: RADIOLOGY

## 2018-10-17 PROCEDURE — 76856 US EXAM PELVIC COMPLETE: CPT | Mod: 26,,, | Performed by: RADIOLOGY

## 2018-10-19 DIAGNOSIS — E28.9 DYSFUNCTION OVARIAN: Primary | ICD-10-CM

## 2018-10-24 ENCOUNTER — HOSPITAL ENCOUNTER (OUTPATIENT)
Dept: RADIOLOGY | Facility: OTHER | Age: 38
Discharge: HOME | End: 2018-10-24
Attending: OBSTETRICS & GYNECOLOGY
Payer: COMMERCIAL

## 2018-10-24 DIAGNOSIS — E28.9 DYSFUNCTION OVARIAN: ICD-10-CM

## 2018-10-24 PROCEDURE — 76856 US EXAM PELVIC COMPLETE: CPT | Mod: 26,,, | Performed by: RADIOLOGY

## 2018-10-24 PROCEDURE — 76856 US EXAM PELVIC COMPLETE: CPT | Mod: TC

## 2018-10-24 PROCEDURE — 76830 TRANSVAGINAL US NON-OB: CPT | Mod: 26,,, | Performed by: RADIOLOGY

## 2018-10-26 ENCOUNTER — HOSPITAL ENCOUNTER (OUTPATIENT)
Dept: RADIOLOGY | Facility: HOSPITAL | Age: 38
Discharge: HOME | End: 2018-10-26
Attending: OBSTETRICS & GYNECOLOGY
Payer: COMMERCIAL

## 2018-10-26 DIAGNOSIS — E28.9 DYSFUNCTION OVARIAN: ICD-10-CM

## 2018-10-26 PROCEDURE — 76830 TRANSVAGINAL US NON-OB: CPT | Mod: TC

## 2018-10-26 PROCEDURE — 76856 US EXAM PELVIC COMPLETE: CPT | Mod: TC

## 2018-10-26 PROCEDURE — 76856 US EXAM PELVIC COMPLETE: CPT | Mod: 26,,, | Performed by: RADIOLOGY

## 2018-10-26 PROCEDURE — 76830 TRANSVAGINAL US NON-OB: CPT | Mod: 26,,, | Performed by: RADIOLOGY

## 2018-11-02 ENCOUNTER — HOSPITAL ENCOUNTER (OUTPATIENT)
Dept: RADIOLOGY | Facility: HOSPITAL | Age: 38
Discharge: HOME | End: 2018-11-02
Attending: OBSTETRICS & GYNECOLOGY
Payer: COMMERCIAL

## 2018-11-02 ENCOUNTER — TELEPHONE (OUTPATIENT)
Dept: OBSTETRICS AND GYNECOLOGY | Facility: CLINIC | Age: 38
End: 2018-11-02

## 2018-11-02 DIAGNOSIS — R10.2 PELVIC PAIN IN FEMALE: ICD-10-CM

## 2018-11-02 DIAGNOSIS — R10.2 PELVIC PAIN IN FEMALE: Primary | ICD-10-CM

## 2018-11-02 PROCEDURE — 76856 US EXAM PELVIC COMPLETE: CPT | Mod: 26,,, | Performed by: RADIOLOGY

## 2018-11-02 PROCEDURE — 76856 US EXAM PELVIC COMPLETE: CPT | Mod: TC

## 2018-11-02 PROCEDURE — 76830 TRANSVAGINAL US NON-OB: CPT | Mod: 26,,, | Performed by: RADIOLOGY

## 2018-11-02 NOTE — TELEPHONE ENCOUNTER
----- Message from Ariana Pennington sent at 11/2/2018  9:47 AM CDT -----  Contact: [phone]/ self   Pt its requesting an appointment for today . Please advise

## 2018-11-02 NOTE — TELEPHONE ENCOUNTER
I spoke with her and she [place].  She thinks she has OHSS and wants an appt today.  I spoke with Dr. Dumont.  She requested her MRN and she will call her and take care of this.

## 2018-11-02 NOTE — PROGRESS NOTES
Patient s/p egg retrieval on Monday, Oct 29, 2018.  Reports that 43 eggs retrieved. Reports that her estradiol level was 4000.  Was told by MDs to watch out for OHSS (ovarian hyperstimulation syndrome).  Patient reports that she is very swollen.   In lots of pain.  Hydrating well.    Pelvic US ordered to check for ascites.  CMP and CBC ordered to check for electrolyte abnormalities.    babak nolen MD

## 2018-11-03 NOTE — PROGRESS NOTES
I spoke directly to the patient.  CBC and CMP results discussed.  US results discussed.  Ovaries are very large.  But, no ascites noted.  Patient encouraged to stay hydrated.  She can come to ED if any problems with urination.    Will see her next week.    babak nolen MD

## 2018-11-05 ENCOUNTER — TELEPHONE (OUTPATIENT)
Dept: OBSTETRICS AND GYNECOLOGY | Facility: CLINIC | Age: 38
End: 2018-11-05

## 2018-11-05 NOTE — TELEPHONE ENCOUNTER
----- Message from Radha Dumont MD sent at 11/2/2018  6:59 PM CDT -----  Please add patient to my schedule for wed nov 7 at 10am.  Pelvic pain.    Please call patient to see if she still wants that appointment.    Dr dumont

## 2018-11-07 ENCOUNTER — OFFICE VISIT (OUTPATIENT)
Dept: OPHTHALMOLOGY | Facility: CLINIC | Age: 38
End: 2018-11-07
Payer: COMMERCIAL

## 2018-11-07 ENCOUNTER — OFFICE VISIT (OUTPATIENT)
Dept: OBSTETRICS AND GYNECOLOGY | Facility: CLINIC | Age: 38
End: 2018-11-07
Payer: COMMERCIAL

## 2018-11-07 VITALS
HEIGHT: 62 IN | DIASTOLIC BLOOD PRESSURE: 78 MMHG | BODY MASS INDEX: 53.92 KG/M2 | SYSTOLIC BLOOD PRESSURE: 128 MMHG | WEIGHT: 293 LBS

## 2018-11-07 DIAGNOSIS — R10.2 ACUTE PELVIC PAIN, FEMALE: Primary | ICD-10-CM

## 2018-11-07 DIAGNOSIS — H18.603 KERATOCONUS, BILATERAL: Primary | ICD-10-CM

## 2018-11-07 DIAGNOSIS — T86.8409 CORNEAL GRAFT REJECTION: ICD-10-CM

## 2018-11-07 PROCEDURE — 92014 COMPRE OPH EXAM EST PT 1/>: CPT | Mod: S$GLB,,, | Performed by: OPHTHALMOLOGY

## 2018-11-07 PROCEDURE — 99212 OFFICE O/P EST SF 10 MIN: CPT | Mod: S$GLB,,, | Performed by: OBSTETRICS & GYNECOLOGY

## 2018-11-07 PROCEDURE — 99999 PR PBB SHADOW E&M-EST. PATIENT-LVL III: CPT | Mod: PBBFAC,,, | Performed by: OBSTETRICS & GYNECOLOGY

## 2018-11-07 PROCEDURE — 99999 PR PBB SHADOW E&M-EST. PATIENT-LVL III: CPT | Mod: PBBFAC,,, | Performed by: OPHTHALMOLOGY

## 2018-11-07 PROCEDURE — 3008F BODY MASS INDEX DOCD: CPT | Mod: CPTII,S$GLB,, | Performed by: OBSTETRICS & GYNECOLOGY

## 2018-11-07 RX ORDER — FLUOROMETHOLONE 1 MG/ML
1 SUSPENSION/ DROPS OPHTHALMIC 3 TIMES DAILY
Qty: 15 ML | Refills: 3 | Status: SHIPPED | OUTPATIENT
Start: 2018-11-07 | End: 2018-11-17

## 2018-11-07 NOTE — PROGRESS NOTES
Patient s/p egg retrieval on Monday, Oct 29, 2018.  Reports that 43 eggs retrieved. Reports that her estradiol level was 4000.  Was told by MDs to watch out for OHSS (ovarian hyperstimulation syndrome).  Patient reports that she is very swollen.   Her pain has improved. She is feeling sore now.  She had pelvic US completed last week which showed very large ovaries - no ascites was noted.  CBC and CMP were normal.    Patient is awaiting for her cycle to start. At that point, she will prepare for IVF.    On vaginal exam, there is copious blood in the vagina;   Bimanual exam is normal.    AP: ABdominal pain, r/o OHSS  Patient stable  Repeat US and CBC, BMP ordered to check electrolytes    babak nolen MD

## 2018-11-07 NOTE — PROGRESS NOTES
HPI     S/p DSEK OS (5/10/18)    Patient states her vision is just OK. Patient states she has been getting   floaters in her right .     PF 2-3x day..     Last edited by Kate Kelley on 11/7/2018  3:41 PM. (History)            Assessment /Plan     For exam results, see Encounter Report.    Keratoconus, bilateral    Corneal graft rejection      DSEK under PKP OS with excellent result.  MRx OS soon.  OD PKP stable and clear. Signs and symptoms of graft rejection reviewed.  IOP high OD, so FML tid

## 2018-11-08 ENCOUNTER — HOSPITAL ENCOUNTER (OUTPATIENT)
Dept: RADIOLOGY | Facility: HOSPITAL | Age: 38
Discharge: HOME | End: 2018-11-08
Attending: OBSTETRICS & GYNECOLOGY
Payer: COMMERCIAL

## 2018-11-08 DIAGNOSIS — R10.2 ACUTE PELVIC PAIN, FEMALE: ICD-10-CM

## 2018-11-08 PROCEDURE — 76830 TRANSVAGINAL US NON-OB: CPT | Mod: 26,,, | Performed by: RADIOLOGY

## 2018-11-08 PROCEDURE — 76856 US EXAM PELVIC COMPLETE: CPT | Mod: TC

## 2018-11-08 PROCEDURE — 76856 US EXAM PELVIC COMPLETE: CPT | Mod: 26,,, | Performed by: RADIOLOGY

## 2018-11-12 DIAGNOSIS — E28.9 DYSFUNCTION OVARIAN: Primary | ICD-10-CM

## 2018-11-19 ENCOUNTER — HOSPITAL ENCOUNTER (OUTPATIENT)
Dept: RADIOLOGY | Facility: OTHER | Age: 38
Discharge: HOME | End: 2018-11-19
Attending: OBSTETRICS & GYNECOLOGY
Payer: COMMERCIAL

## 2018-11-19 DIAGNOSIS — E28.9 DYSFUNCTION OVARIAN: ICD-10-CM

## 2018-11-19 PROCEDURE — 76830 TRANSVAGINAL US NON-OB: CPT | Mod: 26,,, | Performed by: RADIOLOGY

## 2018-11-19 PROCEDURE — 76830 TRANSVAGINAL US NON-OB: CPT | Mod: TC

## 2018-11-19 PROCEDURE — 76856 US EXAM PELVIC COMPLETE: CPT | Mod: 26,,, | Performed by: RADIOLOGY

## 2018-11-19 PROCEDURE — 76856 US EXAM PELVIC COMPLETE: CPT | Mod: TC

## 2018-12-11 DIAGNOSIS — O09.00 PREGNANCY WITH HISTORY OF INFERTILITY: Primary | ICD-10-CM

## 2018-12-13 ENCOUNTER — HOSPITAL ENCOUNTER (OUTPATIENT)
Dept: RADIOLOGY | Facility: HOSPITAL | Age: 38
Discharge: HOME | End: 2018-12-13
Attending: OBSTETRICS & GYNECOLOGY
Payer: COMMERCIAL

## 2018-12-13 DIAGNOSIS — O09.00 PREGNANCY WITH HISTORY OF INFERTILITY: ICD-10-CM

## 2018-12-13 PROCEDURE — 76801 OB US < 14 WKS SINGLE FETUS: CPT | Mod: 26,,, | Performed by: RADIOLOGY

## 2018-12-13 PROCEDURE — 76801 OB US < 14 WKS SINGLE FETUS: CPT | Mod: TC

## 2018-12-13 PROCEDURE — 76817 TRANSVAGINAL US OBSTETRIC: CPT | Mod: 26,,, | Performed by: RADIOLOGY

## 2018-12-14 ENCOUNTER — LAB VISIT (OUTPATIENT)
Dept: LAB | Facility: HOSPITAL | Age: 38
End: 2018-12-14
Attending: OBSTETRICS & GYNECOLOGY
Payer: COMMERCIAL

## 2018-12-14 DIAGNOSIS — R10.2 ACUTE PELVIC PAIN, FEMALE: ICD-10-CM

## 2018-12-14 DIAGNOSIS — O09.00 PREGNANCY WITH HISTORY OF INFERTILITY: Primary | ICD-10-CM

## 2018-12-14 LAB
ALBUMIN SERPL BCP-MCNC: 2.8 G/DL
ALP SERPL-CCNC: 79 U/L
ALT SERPL W/O P-5'-P-CCNC: 14 U/L
ANION GAP SERPL CALC-SCNC: 8 MMOL/L
AST SERPL-CCNC: 14 U/L
BILIRUB SERPL-MCNC: 0.3 MG/DL
BUN SERPL-MCNC: 16 MG/DL
CALCIUM SERPL-MCNC: 8.7 MG/DL
CHLORIDE SERPL-SCNC: 108 MMOL/L
CO2 SERPL-SCNC: 23 MMOL/L
CREAT SERPL-MCNC: 0.8 MG/DL
EST. GFR  (AFRICAN AMERICAN): >60 ML/MIN/1.73 M^2
EST. GFR  (NON AFRICAN AMERICAN): >60 ML/MIN/1.73 M^2
ESTRADIOL SERPL-MCNC: 233 PG/ML
GLUCOSE SERPL-MCNC: 95 MG/DL
HCG INTACT+B SERPL-ACNC: 2017 MIU/ML
POTASSIUM SERPL-SCNC: 3.9 MMOL/L
PROGEST SERPL-MCNC: 16 NG/ML
PROT SERPL-MCNC: 6.6 G/DL
SODIUM SERPL-SCNC: 139 MMOL/L
TSH SERPL DL<=0.005 MIU/L-ACNC: 1.68 UIU/ML

## 2018-12-14 PROCEDURE — 84144 ASSAY OF PROGESTERONE: CPT

## 2018-12-14 PROCEDURE — 80053 COMPREHEN METABOLIC PANEL: CPT

## 2018-12-14 PROCEDURE — 36415 COLL VENOUS BLD VENIPUNCTURE: CPT | Mod: PO

## 2018-12-14 PROCEDURE — 84443 ASSAY THYROID STIM HORMONE: CPT

## 2018-12-14 PROCEDURE — 84702 CHORIONIC GONADOTROPIN TEST: CPT

## 2018-12-14 PROCEDURE — 82670 ASSAY OF TOTAL ESTRADIOL: CPT

## 2018-12-18 DIAGNOSIS — O09.00 PREGNANCY WITH HISTORY OF INFERTILITY: Primary | ICD-10-CM

## 2018-12-21 ENCOUNTER — HOSPITAL ENCOUNTER (OUTPATIENT)
Dept: RADIOLOGY | Facility: OTHER | Age: 38
Discharge: HOME | End: 2018-12-21
Attending: OBSTETRICS & GYNECOLOGY
Payer: COMMERCIAL

## 2018-12-21 ENCOUNTER — TELEPHONE (OUTPATIENT)
Dept: OBSTETRICS AND GYNECOLOGY | Facility: CLINIC | Age: 38
End: 2018-12-21

## 2018-12-21 DIAGNOSIS — O09.00 PREGNANCY WITH HISTORY OF INFERTILITY: ICD-10-CM

## 2018-12-21 PROCEDURE — 76801 OB US < 14 WKS SINGLE FETUS: CPT | Mod: TC

## 2018-12-21 PROCEDURE — 76817 TRANSVAGINAL US OBSTETRIC: CPT | Mod: 26,,, | Performed by: RADIOLOGY

## 2018-12-21 PROCEDURE — 76801 OB US < 14 WKS SINGLE FETUS: CPT | Mod: 26,,, | Performed by: RADIOLOGY

## 2018-12-21 NOTE — TELEPHONE ENCOUNTER
----- Message from Masha Faye sent at 12/21/2018 12:17 PM CST -----  Contact: self/[phone]  Patient would like to schedule her Ultrasound.    Please call and advise.

## 2018-12-22 ENCOUNTER — HOSPITAL ENCOUNTER (EMERGENCY)
Facility: HOSPITAL | Age: 38
Discharge: HOME | End: 2018-12-22
Attending: EMERGENCY MEDICINE
Payer: COMMERCIAL

## 2018-12-22 VITALS
DIASTOLIC BLOOD PRESSURE: 59 MMHG | OXYGEN SATURATION: 100 % | SYSTOLIC BLOOD PRESSURE: 118 MMHG | HEART RATE: 60 BPM | RESPIRATION RATE: 18 BRPM | TEMPERATURE: 98 F

## 2018-12-22 DIAGNOSIS — N93.9 VAGINAL BLEEDING: Primary | ICD-10-CM

## 2018-12-22 DIAGNOSIS — O20.0 THREATENED ABORTION: ICD-10-CM

## 2018-12-22 DIAGNOSIS — Z3A.01 LESS THAN 8 WEEKS GESTATION OF PREGNANCY: ICD-10-CM

## 2018-12-22 LAB
ABO + RH BLD: NORMAL
ANION GAP SERPL CALC-SCNC: 8 MMOL/L
B-HCG UR QL: POSITIVE
BACTERIA #/AREA URNS AUTO: ABNORMAL /HPF
BACTERIA GENITAL QL WET PREP: ABNORMAL
BASOPHILS # BLD AUTO: 0.07 K/UL
BASOPHILS NFR BLD: 0.6 %
BILIRUB UR QL STRIP: NEGATIVE
BLD GP AB SCN CELLS X3 SERPL QL: NORMAL
BUN SERPL-MCNC: 14 MG/DL
CALCIUM SERPL-MCNC: 9.4 MG/DL
CHLORIDE SERPL-SCNC: 107 MMOL/L
CLARITY UR REFRACT.AUTO: ABNORMAL
CLUE CELLS VAG QL WET PREP: ABNORMAL
CO2 SERPL-SCNC: 24 MMOL/L
COLOR UR AUTO: YELLOW
CREAT SERPL-MCNC: 0.8 MG/DL
CTP QC/QA: YES
DIFFERENTIAL METHOD: ABNORMAL
EOSINOPHIL # BLD AUTO: 0.1 K/UL
EOSINOPHIL NFR BLD: 0.8 %
ERYTHROCYTE [DISTWIDTH] IN BLOOD BY AUTOMATED COUNT: 15.3 %
EST. GFR  (AFRICAN AMERICAN): >60 ML/MIN/1.73 M^2
EST. GFR  (NON AFRICAN AMERICAN): >60 ML/MIN/1.73 M^2
FILAMENT FUNGI VAG WET PREP-#/AREA: ABNORMAL
GLUCOSE SERPL-MCNC: 100 MG/DL
GLUCOSE UR QL STRIP: NEGATIVE
HCG INTACT+B SERPL-ACNC: NORMAL MIU/ML
HCT VFR BLD AUTO: 34.9 %
HGB BLD-MCNC: 10.8 G/DL
HGB UR QL STRIP: ABNORMAL
HYALINE CASTS UR QL AUTO: 4 /LPF
IMM GRANULOCYTES # BLD AUTO: 0.06 K/UL
IMM GRANULOCYTES NFR BLD AUTO: 0.5 %
KETONES UR QL STRIP: ABNORMAL
LEUKOCYTE ESTERASE UR QL STRIP: NEGATIVE
LYMPHOCYTES # BLD AUTO: 2 K/UL
LYMPHOCYTES NFR BLD: 16.7 %
MCH RBC QN AUTO: 23.5 PG
MCHC RBC AUTO-ENTMCNC: 30.9 G/DL
MCV RBC AUTO: 76 FL
MICROSCOPIC COMMENT: ABNORMAL
MONOCYTES # BLD AUTO: 0.7 K/UL
MONOCYTES NFR BLD: 6 %
NEUTROPHILS # BLD AUTO: 9 K/UL
NEUTROPHILS NFR BLD: 75.4 %
NITRITE UR QL STRIP: NEGATIVE
NRBC BLD-RTO: 0 /100 WBC
PH UR STRIP: 5 [PH] (ref 5–8)
PLATELET # BLD AUTO: 319 K/UL
PMV BLD AUTO: 11.3 FL
POTASSIUM SERPL-SCNC: 3.4 MMOL/L
PROT UR QL STRIP: ABNORMAL
RBC # BLD AUTO: 4.59 M/UL
RBC #/AREA URNS AUTO: >100 /HPF (ref 0–4)
SODIUM SERPL-SCNC: 139 MMOL/L
SP GR UR STRIP: 1.03 (ref 1–1.03)
SPECIMEN SOURCE: ABNORMAL
SQUAMOUS #/AREA URNS AUTO: 9 /HPF
T VAGINALIS GENITAL QL WET PREP: ABNORMAL
URN SPEC COLLECT METH UR: ABNORMAL
WBC # BLD AUTO: 11.96 K/UL
WBC #/AREA URNS AUTO: 39 /HPF (ref 0–5)
WBC #/AREA VAG WET PREP: ABNORMAL
YEAST GENITAL QL WET PREP: ABNORMAL

## 2018-12-22 PROCEDURE — 85025 COMPLETE CBC W/AUTO DIFF WBC: CPT

## 2018-12-22 PROCEDURE — 81001 URINALYSIS AUTO W/SCOPE: CPT

## 2018-12-22 PROCEDURE — 87086 URINE CULTURE/COLONY COUNT: CPT

## 2018-12-22 PROCEDURE — 87491 CHLMYD TRACH DNA AMP PROBE: CPT

## 2018-12-22 PROCEDURE — 99284 EMERGENCY DEPT VISIT MOD MDM: CPT | Mod: 25

## 2018-12-22 PROCEDURE — 81025 URINE PREGNANCY TEST: CPT | Performed by: PHYSICIAN ASSISTANT

## 2018-12-22 PROCEDURE — 87210 SMEAR WET MOUNT SALINE/INK: CPT

## 2018-12-22 PROCEDURE — 86901 BLOOD TYPING SEROLOGIC RH(D): CPT

## 2018-12-22 PROCEDURE — 87088 URINE BACTERIA CULTURE: CPT

## 2018-12-22 PROCEDURE — 80048 BASIC METABOLIC PNL TOTAL CA: CPT

## 2018-12-22 PROCEDURE — 87186 SC STD MICRODIL/AGAR DIL: CPT

## 2018-12-22 PROCEDURE — 87077 CULTURE AEROBIC IDENTIFY: CPT

## 2018-12-22 PROCEDURE — 99283 EMERGENCY DEPT VISIT LOW MDM: CPT | Mod: ,,, | Performed by: PHYSICIAN ASSISTANT

## 2018-12-22 PROCEDURE — 84702 CHORIONIC GONADOTROPIN TEST: CPT

## 2018-12-22 NOTE — ED PROVIDER NOTES
"Encounter Date: 2018       History     Chief Complaint   Patient presents with    Vaginal Bleeding     Patient 6 weeks pregnant. Reports "gush" of blood today while eating lunch. Patient reports bright red blood with "clot" present. Denies pain.     38 year old female  (6 weeks gestation, IVF) with medical history of obesity, PCOS, L ovarian cyst, infertility presenting to the ED with the chief complaint of vaginal bleeding. Patient reports developing vaginal bleeding 1 hour PTA. She reports having a sudden "gush" of bright red blood with blood clots present. She denies fever, chest pain, SOB, abdominal pain, nausea, vomiting, dysuria, hematuria, diarrhea, constipation, melena, hematochezia. No recent falls or trauma. She reports feeling at her baseline healthShe is followed by an infertility clinic in [place] and has an appointment to be established at OBGYN at 2019. She is taking her prenatal vitamins currently and progesterone suppositories. She denies ETOH, tobacco, and recreational drug use. Patient had an U/S yesterday that showed an intrauterine pregnancy with EGA 6 weeks.           Review of patient's allergies indicates:   Allergen Reactions    Penicillins Anaphylaxis     Past Medical History:   Diagnosis Date    Nuclear cataract 2017     Past Surgical History:   Procedure Laterality Date    ABDOMINAL SURGERY      BREAST BIOPSY      CHOLECYSTECTOMY      CORNEAL TRANSPLANT Left 16    Dr Sharp    CORNEAL TRANSPLANT Right 2017    Dr. Sharp    DILATION AND CURETTAGE OF UTERUS      gallbaldder N/A 2015    HERNIA REPAIR      HIATAL HERNIA REPAIR      STOMACH SURGERY  Gastric sleeve    TRANSPLANT-CORNEA Right 2017    [facility]    TRANSPLANT-CORNEA/PKP Left 2016    [facility]    TRANSPLANT-DSAEK CORNEAL Left 5/10/2018    [facility]     Family History   Problem " Relation Age of Onset    Breast cancer Maternal Grandmother     Diabetes Mother     Hypertension Mother     Glaucoma Mother     Breast cancer Maternal Aunt     Breast cancer Paternal Aunt     No Known Problems Father     No Known Problems Sister     No Known Problems Brother     No Known Problems Maternal Uncle     No Known Problems Paternal Uncle     No Known Problems Paternal Grandmother     No Known Problems Paternal Grandfather     Breast cancer Maternal Aunt     Breast cancer Maternal Aunt     Breast cancer Maternal Aunt     Breast cancer Maternal Aunt     Amblyopia Neg Hx     Blindness Neg Hx     Cancer Neg Hx     Cataracts Neg Hx     Macular degeneration Neg Hx     Retinal detachment Neg Hx     Strabismus Neg Hx     Stroke Neg Hx     Thyroid disease Neg Hx      Social History     Tobacco Use    Smoking status: Never Smoker    Smokeless tobacco: Never Used   Substance Use Topics    Alcohol use: Yes    Drug use: No     Review of Systems   Constitutional: Negative for chills, diaphoresis and fever.   HENT: Negative for congestion, sore throat and trouble swallowing.    Eyes: Negative for pain and redness.   Respiratory: Negative for cough and shortness of breath.    Cardiovascular: Negative for chest pain.   Gastrointestinal: Negative for abdominal pain, diarrhea, nausea and vomiting.   Genitourinary: Positive for vaginal bleeding. Negative for dysuria, flank pain, hematuria, menstrual problem and vaginal discharge.   Musculoskeletal: Negative for back pain, neck pain and neck stiffness.   Skin: Negative for rash and wound.   Neurological: Negative for weakness, light-headedness and numbness.     Physical Exam     Initial Vitals [12/22/18 1350]   BP Pulse Resp Temp SpO2   (!) 164/89 110 20 98 °F (36.7 °C) 98 %      MAP       --         Physical Exam    Constitutional: She appears well-developed and well-nourished. She is not diaphoretic. No distress.   HENT:   Head: Normocephalic  and atraumatic.   Mouth/Throat: Oropharynx is clear and moist. No oropharyngeal exudate.   Eyes: EOM are normal. Pupils are equal, round, and reactive to light.   Neck: Normal range of motion. Neck supple.   Cardiovascular: Normal rate and regular rhythm.   Pulmonary/Chest: Breath sounds normal. No respiratory distress. She has no wheezes.   Abdominal: Soft. There is no tenderness.   Genitourinary:   Genitourinary Comments: Pelvic exam - No external abnormalities. Mild amount of dried, dark red blood in vaginal canal. No lesions or injury to vaginal walls. Cervical os closed. No CMT or adnexal tenderness. Chaperone present for exam.    Musculoskeletal: Normal range of motion. She exhibits no edema or tenderness.   Neurological: She is alert and oriented to person, place, and time. She has normal strength. No cranial nerve deficit or sensory deficit.   Skin: Skin is warm and dry. No erythema.       ED Course   Procedures  Labs Reviewed   URINALYSIS, REFLEX TO URINE CULTURE - Abnormal; Notable for the following components:       Result Value    Appearance, UA Cloudy (*)     Protein, UA 1+ (*)     Ketones, UA Trace (*)     Occult Blood UA 3+ (*)     All other components within normal limits    Narrative:     Preferred Collection Type->Urine, Clean Catch   CBC W/ AUTO DIFFERENTIAL - Abnormal; Notable for the following components:    Hemoglobin 10.8 (*)     Hematocrit 34.9 (*)     MCV 76 (*)     MCH 23.5 (*)     MCHC 30.9 (*)     RDW 15.3 (*)     Gran # (ANC) 9.0 (*)     Immature Grans (Abs) 0.06 (*)     Gran% 75.4 (*)     Lymph% 16.7 (*)     All other components within normal limits   VAGINAL SCREEN - Abnormal; Notable for the following components:    Bacteria - Vaginal Screen Rare (*)     All other components within normal limits   BASIC METABOLIC PANEL - Abnormal; Notable for the following components:    Potassium 3.4 (*)     All other components within normal limits   URINALYSIS MICROSCOPIC - Abnormal; Notable for  the following components:    RBC, UA >100 (*)     WBC, UA 39 (*)     Hyaline Casts, UA 4 (*)     All other components within normal limits    Narrative:     Preferred Collection Type->Urine, Clean Catch   POCT URINE PREGNANCY - Abnormal; Notable for the following components:    POC Preg Test, Ur Positive (*)     All other components within normal limits   C. TRACHOMATIS/N. GONORRHOEAE BY AMP DNA   CULTURE, URINE   HCG, QUANTITATIVE, PREGNANCY   TYPE & SCREEN          Imaging Results          US OB Less Than 14 Wks First Gestation (Final result)  Result time 18 18:56:35    Final result by Geoffrey Curiel MD (18 18:56:35)                 Impression:      Single live intrauterine pregnancy with estimated gestational age 6 weeks 2 days.    Electronically signed by resident: Rakesh Shipley  Date:    2018  Time:    18:25    Electronically signed by: Geoffrey Curiel MD  Date:    2018  Time:    18:56             Narrative:    EXAMINATION:  US OB LESS THAN 14 WEEKS FIRST GESTATION    CLINICAL HISTORY:  Abnormal uterine and vaginal bleeding, unspecified    TECHNIQUE:  Transabdominal sonography of the pelvis was performed, followed by transvaginal sonography to better evaluate the uterus and ovaries.    COMPARISON:  Ultrasound obstetric less than 14 weeks 2018.    FINDINGS:  Intrauterine gestation(s): Single    Mean gestational sac diameter: 1.53 cm    Yolk sac: 3 mm    Crown-rump length (CRL): 0.4 cm    Cardiac activity: 152 bpm    Subchorionic hemorrhage: None.    Right ovary: Upper normal in size to mildly enlarged measuring 3.4 x 3.2 x 4.2 cm.  Vascular flow present.    Left ovary: Upper normal sized to mildly enlarged measuring 3.9 x 2.1 x 3.9 cm.  Vascular flow present    Miscellaneous: No Free Fluid.  There is a small subserosal fibroid in the uterus, similar to prior.                                       APC / Resident Notes:   38 year old female  (6 weeks gestation, IVF) with medical  history of obesity, PCOS, L ovarian cyst, infertility presenting to the ED c/o vaginal bleeding starting 1 hour PTA. DDx includes but not limited to threatened miscarriage, dysfunctional uterine bleeding, trauma, ectopic pregnancy, uterine fibroids, malignancy. Will get labs and OB U/S.     Work-up shows unremarkable CBC and BMP. H/H stable 10.8/34.9. Vaginal screen negative. UA negative for UTI. Quantitative hCG trending up 2017 to 14,016 today. U/S shows single live intrauterine pregnancy with EGA 6 weeks and 2 days.     Etiology consistent with threatened miscarriage. Patient's vital stable on reassessment. No additional vaginal bleeding during ED stay. Patient stable for discharge with outpatient management with OBGYN. Ambulatory referral sent to OBGYN. Discussed treatment plan with patient and she is agreeable. Return to ED precautions given for new, worsening, or concerning symptoms. I have discussed the care of this patient with my supervising physician.                  Clinical Impression:   The primary encounter diagnosis was Vaginal bleeding. Diagnoses of Less than 8 weeks gestation of pregnancy and Threatened  were also pertinent to this visit.      Disposition:   Disposition: Discharged  Condition: Stable                        Geoffrey Schwartz PA-C  [phone]

## 2018-12-22 NOTE — ED NOTES
"Pt reports to ED with complaints of vaginal bleeding, pt reports 6 weeks pregnant. Pt reports transvaginal ultrasound yesterday. Pt reports abdominal cramping, "bleeding like a period, started 1 hour ago", multiple clots. Pt denies trauma, falls, weakness, dizziness, N/V/D, fever, chills, CP, SOB.     LOC: The patient is awake, alert and aware of environment with an appropriate affect, the patient is oriented x 3 and speaking appropriately.  APPEARANCE: Patient resting comfortably and in no acute distress, patient is clean and well groomed  SKIN: The skin is warm and dry, color consistent with ethnicity, skin intact, no breakdown or bruising noted.  MUSCULOSKELETAL: Patient moving all extremities well, no obvious swelling or deformities noted. Pt ambulates unassisted with steady gait.   RESPIRATORY: Airway is open and patent; respirations are spontaneous, patient has a normal effort and rate, no accessory muscle use noted.   CARDIAC: No complaints of chest pain at this time.   ABDOMEN: Soft and non tender to palpation, no distention noted. Bowel sounds present x 4. Pt reports abdominal cramping.   NEUROLOGIC: PERRL, 3 mm bilaterally, eyes open spontaneously, behavior appropriate to situation, follows commands, normal sensation in all extremities when touched with a finger.    "

## 2018-12-23 NOTE — DISCHARGE INSTRUCTIONS
Please follow-up with your OBGYN for evaluation. Please return to the ED for new, worsening, or concerning symptoms.

## 2018-12-24 LAB
BACTERIA UR CULT: NORMAL
C TRACH DNA SPEC QL NAA+PROBE: NOT DETECTED
N GONORRHOEA DNA SPEC QL NAA+PROBE: NOT DETECTED

## 2018-12-26 ENCOUNTER — TELEPHONE (OUTPATIENT)
Dept: OBSTETRICS AND GYNECOLOGY | Facility: CLINIC | Age: 38
End: 2018-12-26

## 2018-12-26 NOTE — TELEPHONE ENCOUNTER
----- Message from Vikki Jang sent at 12/26/2018  1:49 PM CST -----  Contact: Pt   Pt is requesting a callback from office says she need to be seen sooner went to the ER for vaginal bleeding on Saturday and was informed to f/u with her gyn    Pt can be reached at [phone]    Thanks

## 2018-12-26 NOTE — TELEPHONE ENCOUNTER
I spoke with Dr. Dumont and I called her and recommended that she be seen next Thursday.  She scheduled for 10 and had no further questions.

## 2018-12-27 DIAGNOSIS — O09.00 PREGNANCY WITH HISTORY OF INFERTILITY: Primary | ICD-10-CM

## 2018-12-28 ENCOUNTER — HOSPITAL ENCOUNTER (OUTPATIENT)
Dept: RADIOLOGY | Facility: OTHER | Age: 38
Discharge: HOME | End: 2018-12-28
Attending: OBSTETRICS & GYNECOLOGY
Payer: COMMERCIAL

## 2018-12-28 DIAGNOSIS — O09.00 PREGNANCY WITH HISTORY OF INFERTILITY: ICD-10-CM

## 2018-12-28 PROCEDURE — 76801 OB US < 14 WKS SINGLE FETUS: CPT | Mod: TC

## 2018-12-28 PROCEDURE — 76801 OB US < 14 WKS SINGLE FETUS: CPT | Mod: 26,,, | Performed by: RADIOLOGY

## 2018-12-28 PROCEDURE — 76817 TRANSVAGINAL US OBSTETRIC: CPT | Mod: 26,,, | Performed by: RADIOLOGY

## 2019-01-03 ENCOUNTER — HOSPITAL ENCOUNTER (OUTPATIENT)
Dept: RADIOLOGY | Facility: HOSPITAL | Age: 39
Discharge: HOME | End: 2019-01-03
Attending: OBSTETRICS & GYNECOLOGY
Payer: COMMERCIAL

## 2019-01-03 ENCOUNTER — TELEPHONE (OUTPATIENT)
Dept: OBSTETRICS AND GYNECOLOGY | Facility: CLINIC | Age: 39
End: 2019-01-03

## 2019-01-03 ENCOUNTER — ROUTINE PRENATAL (OUTPATIENT)
Dept: OBSTETRICS AND GYNECOLOGY | Facility: CLINIC | Age: 39
End: 2019-01-03
Payer: COMMERCIAL

## 2019-01-03 VITALS — SYSTOLIC BLOOD PRESSURE: 102 MMHG | BODY MASS INDEX: 53.1 KG/M2 | WEIGHT: 290.38 LBS | DIASTOLIC BLOOD PRESSURE: 80 MMHG

## 2019-01-03 DIAGNOSIS — E03.8 OTHER SPECIFIED HYPOTHYROIDISM: ICD-10-CM

## 2019-01-03 DIAGNOSIS — O23.41 UTI (URINARY TRACT INFECTION) DURING PREGNANCY, FIRST TRIMESTER: ICD-10-CM

## 2019-01-03 DIAGNOSIS — N91.2 AMENORRHEA: Primary | ICD-10-CM

## 2019-01-03 DIAGNOSIS — R11.0 NAUSEA: ICD-10-CM

## 2019-01-03 DIAGNOSIS — N91.2 AMENORRHEA: ICD-10-CM

## 2019-01-03 PROCEDURE — 99999 PR PBB SHADOW E&M-EST. PATIENT-LVL III: CPT | Mod: PBBFAC,,, | Performed by: OBSTETRICS & GYNECOLOGY

## 2019-01-03 PROCEDURE — 87086 URINE CULTURE/COLONY COUNT: CPT

## 2019-01-03 PROCEDURE — 76801 OB US < 14 WKS SINGLE FETUS: CPT | Mod: 26,,, | Performed by: RADIOLOGY

## 2019-01-03 PROCEDURE — 76801 US OB LESS THAN 14 WKS WITH TRANSVAGINAL (XPD): ICD-10-PCS | Mod: 26,,, | Performed by: RADIOLOGY

## 2019-01-03 PROCEDURE — 99999 PR PBB SHADOW E&M-EST. PATIENT-LVL III: ICD-10-PCS | Mod: PBBFAC,,, | Performed by: OBSTETRICS & GYNECOLOGY

## 2019-01-03 PROCEDURE — 0500F INITIAL PRENATAL CARE VISIT: CPT | Mod: S$GLB,,, | Performed by: OBSTETRICS & GYNECOLOGY

## 2019-01-03 PROCEDURE — 76801 OB US < 14 WKS SINGLE FETUS: CPT | Mod: TC

## 2019-01-03 PROCEDURE — 0500F PR INITIAL PRENATAL CARE VISIT: ICD-10-PCS | Mod: S$GLB,,, | Performed by: OBSTETRICS & GYNECOLOGY

## 2019-01-03 PROCEDURE — 76817 US OB LESS THAN 14 WKS WITH TRANSVAGINAL (XPD): ICD-10-PCS | Mod: 26,,, | Performed by: RADIOLOGY

## 2019-01-03 PROCEDURE — 76817 TRANSVAGINAL US OBSTETRIC: CPT | Mod: 26,,, | Performed by: RADIOLOGY

## 2019-01-03 RX ORDER — PREDNISONE 5 MG/1
5 TABLET ORAL DAILY
COMMUNITY
End: 2019-01-18

## 2019-01-03 RX ORDER — LEVOTHYROXINE SODIUM 25 UG/1
25 TABLET ORAL DAILY
COMMUNITY
End: 2019-04-13 | Stop reason: SDUPTHER

## 2019-01-03 RX ORDER — METOCLOPRAMIDE 5 MG/1
5 TABLET ORAL 3 TIMES DAILY
Qty: 90 TABLET | Refills: 1 | Status: ON HOLD | OUTPATIENT
Start: 2019-01-03 | End: 2019-04-12 | Stop reason: HOSPADM

## 2019-01-03 RX ORDER — ESTRADIOL 2 MG/1
2 TABLET ORAL DAILY
COMMUNITY
End: 2019-02-18

## 2019-01-03 NOTE — TELEPHONE ENCOUNTER
----- Message from Swapna Penny sent at 1/3/2019  4:30 PM CST -----  Contact: [phone]/self  Patient is returning your call. Please advise.

## 2019-01-03 NOTE — PROGRESS NOTES
37 yo now  female with amenorrhea who presents to confirm her pregnancy.    Patient has h/o PCOS. This pregnancy is possible IVF with transfer of Day 5 embryo on 2018 - EDC Aug 14, 2019.  H/o LSC cholecystectomy  No h/o STDs   to female partner.    ROS: nausea and constipation    PE:   /80   Wt 131.7 kg (290 lb 5.5 oz)   LMP 2018 (Exact Date)   BMI 53.10 kg/m²   Gen: Well nourished, well developed, in no acute distress.  ABDOMEN: Soft. obese  PELVIC: Normal external female genitalia without lesions. Normal hair distribution. Adequate perineal body, normal urethral meatus. Vagina with discharge from vaginal progesterone      AP: 37 yo now  female @ 8.1 wks (EDC 19) who presents for new OB visit, IVF patient.    1) SIUP, threatened   -US ordered  -new Ob labs ordered  -pap uptodate    2) AMA: Readdress if patient wants Mat 21 at next visit    3) Obesity    4) Hypothyroid  TSH and free t4 ordered  Continue synthroid 25mcg PO daily    5) UTI - s/p txment  HOLLY at next visit    F/u in 2 wks    babak nolen MD

## 2019-01-04 ENCOUNTER — TELEPHONE (OUTPATIENT)
Dept: OBSTETRICS AND GYNECOLOGY | Facility: CLINIC | Age: 39
End: 2019-01-04

## 2019-01-04 NOTE — TELEPHONE ENCOUNTER
----- Message from Swapna Penny sent at 1/4/2019  9:46 AM CST -----  Contact: [phone]/self  Patient is returning your call. Please advise.

## 2019-01-04 NOTE — TELEPHONE ENCOUNTER
----- Message from Swapna Penny sent at 1/4/2019 11:12 AM CST -----  Contact: [phone]/self  Patient is returning your call. Please advise.

## 2019-01-04 NOTE — TELEPHONE ENCOUNTER
I spoke with her, she has seen all her lab results and u/s results.  I am not sure why we called her.  She had no further questions.

## 2019-01-06 LAB — BACTERIA UR CULT: NO GROWTH

## 2019-01-18 ENCOUNTER — TELEPHONE (OUTPATIENT)
Dept: OBSTETRICS AND GYNECOLOGY | Facility: CLINIC | Age: 39
End: 2019-01-18

## 2019-01-18 ENCOUNTER — OFFICE VISIT (OUTPATIENT)
Dept: OBSTETRICS AND GYNECOLOGY | Facility: CLINIC | Age: 39
End: 2019-01-18
Payer: COMMERCIAL

## 2019-01-18 ENCOUNTER — PROCEDURE VISIT (OUTPATIENT)
Dept: OBSTETRICS AND GYNECOLOGY | Facility: CLINIC | Age: 39
End: 2019-01-18
Payer: COMMERCIAL

## 2019-01-18 VITALS
SYSTOLIC BLOOD PRESSURE: 112 MMHG | DIASTOLIC BLOOD PRESSURE: 74 MMHG | WEIGHT: 293 LBS | HEIGHT: 62 IN | BODY MASS INDEX: 53.92 KG/M2

## 2019-01-18 DIAGNOSIS — Z36.89 ENCOUNTER TO ESTABLISH GESTATIONAL AGE USING ULTRASOUND: ICD-10-CM

## 2019-01-18 DIAGNOSIS — Z34.01 ENCOUNTER FOR SUPERVISION OF NORMAL FIRST PREGNANCY IN FIRST TRIMESTER: Primary | ICD-10-CM

## 2019-01-18 DIAGNOSIS — N83.202 LEFT OVARIAN CYST: ICD-10-CM

## 2019-01-18 DIAGNOSIS — O21.9 NAUSEA AND VOMITING IN PREGNANCY: ICD-10-CM

## 2019-01-18 DIAGNOSIS — O23.41 UTI (URINARY TRACT INFECTION) DURING PREGNANCY, FIRST TRIMESTER: ICD-10-CM

## 2019-01-18 DIAGNOSIS — O09.521 AMA (ADVANCED MATERNAL AGE) MULTIGRAVIDA 35+, FIRST TRIMESTER: ICD-10-CM

## 2019-01-18 DIAGNOSIS — T75.3XXA MOTION SICKNESS, INITIAL ENCOUNTER: ICD-10-CM

## 2019-01-18 DIAGNOSIS — O09.519 AMA (ADVANCED MATERNAL AGE) PRIMIGRAVIDA 35+, UNSPECIFIED TRIMESTER: Primary | ICD-10-CM

## 2019-01-18 PROCEDURE — 76801 OB US < 14 WKS SINGLE FETUS: CPT | Mod: S$GLB,,, | Performed by: OBSTETRICS & GYNECOLOGY

## 2019-01-18 PROCEDURE — 0502F PR SUBSEQUENT PRENATAL CARE: ICD-10-PCS | Mod: S$GLB,,, | Performed by: OBSTETRICS & GYNECOLOGY

## 2019-01-18 PROCEDURE — 99999 PR PBB SHADOW E&M-EST. PATIENT-LVL III: ICD-10-PCS | Mod: PBBFAC,,, | Performed by: OBSTETRICS & GYNECOLOGY

## 2019-01-18 PROCEDURE — 76801 PR US, OB <14WKS, TRANSABD, SINGLE GESTATION: ICD-10-PCS | Mod: S$GLB,,, | Performed by: OBSTETRICS & GYNECOLOGY

## 2019-01-18 PROCEDURE — 99999 PR PBB SHADOW E&M-EST. PATIENT-LVL III: CPT | Mod: PBBFAC,,, | Performed by: OBSTETRICS & GYNECOLOGY

## 2019-01-18 PROCEDURE — 87086 URINE CULTURE/COLONY COUNT: CPT

## 2019-01-18 PROCEDURE — 0502F SUBSEQUENT PRENATAL CARE: CPT | Mod: S$GLB,,, | Performed by: OBSTETRICS & GYNECOLOGY

## 2019-01-18 RX ORDER — SCOLOPAMINE TRANSDERMAL SYSTEM 1 MG/1
1 PATCH, EXTENDED RELEASE TRANSDERMAL
Qty: 4 PATCH | Refills: 1 | Status: SHIPPED | OUTPATIENT
Start: 2019-01-18 | End: 2019-02-18

## 2019-01-18 RX ORDER — PROGESTERONE 50 MG/ML
10 INJECTION, SOLUTION INTRAMUSCULAR DAILY
Qty: 10 ML | Refills: 2 | Status: SHIPPED | OUTPATIENT
Start: 2019-01-18 | End: 2019-01-18 | Stop reason: CLARIF

## 2019-01-18 RX ORDER — DOXYLAMINE SUCCINATE AND PYRIDOXINE HYDROCHLORIDE 10; 10 MG/1; MG/1
2 TABLET, DELAYED RELEASE ORAL NIGHTLY
Qty: 60 TABLET | Refills: 2 | Status: ON HOLD | OUTPATIENT
Start: 2019-01-18 | End: 2019-04-12 | Stop reason: HOSPADM

## 2019-01-18 NOTE — TELEPHONE ENCOUNTER
----- Message from Rahel Davis sent at 1/18/2019 12:27 PM CST -----  Contact: Hattie bellamy/ [facility]/ [phone]  Pharmacy called in to get the prescription code for progesterone (PROGESTERONE IN OIL) 50 mg/mL injection. They are trying to get a PA.    Please call.

## 2019-01-18 NOTE — PROGRESS NOTES
Reports nausea. rx for diclegis provided  rx for progesterone in oil provided - will continue 2cc injections until at least 12 weeks pregnant.    AP: 37 yo now  female @ 10.2 wks (EDC 19) who presents for new OB visit, IVF patient with transfer of Day 5 embryo on 2018 - EDC Aug 14, 2019.    H/o LSC cholecystectomy  No h/o STDs   to female partner.    1) SIUP  -s/p official US  -Rh positive  -pap uptodate    2) AMA: wants mat 21 (ordered)    3) Obesity    4) Hypothyroid  Continue synthroid 25mcg PO daily    5) UTI - s/p txment  HOLLY today    F/u in 4 wks    s MD tamanna

## 2019-01-18 NOTE — TELEPHONE ENCOUNTER
I spoke with Xochitl at [facility] and they were confirming whether the progesterone was 2cc or .2cc as per Rx.  I confirmed with Dr. Dumont that it is 2 cc and she needs enough for 12 days.  They will process.  [facility].

## 2019-01-18 NOTE — LETTER
[place] [facility]  [address] [place] [address], [address]  5th Floor [facility]  [place] [address]  Phone: [phone] January 18, 2019     Patient: Zaida Chung   YOB: 1980   Date of Visit: 1/18/2019       To Whom It May Concern:    My patient is currently pregnant with a due date of August 14, 2019.    If you have any questions or concerns, please don't hesitate to contact my office.    Sincerely,        Radha Dumont MD

## 2019-01-20 LAB — BACTERIA UR CULT: NORMAL

## 2019-02-18 ENCOUNTER — ROUTINE PRENATAL (OUTPATIENT)
Dept: OBSTETRICS AND GYNECOLOGY | Facility: CLINIC | Age: 39
End: 2019-02-18
Payer: COMMERCIAL

## 2019-02-18 VITALS — SYSTOLIC BLOOD PRESSURE: 150 MMHG | DIASTOLIC BLOOD PRESSURE: 90 MMHG | BODY MASS INDEX: 53.63 KG/M2 | WEIGHT: 293 LBS

## 2019-02-18 DIAGNOSIS — O09.512 ADVANCED MATERNAL AGE, 1ST PREGNANCY, SECOND TRIMESTER: ICD-10-CM

## 2019-02-18 DIAGNOSIS — Z34.02 ENCOUNTER FOR SUPERVISION OF NORMAL FIRST PREGNANCY IN SECOND TRIMESTER: Primary | ICD-10-CM

## 2019-02-18 PROCEDURE — 99999 PR PBB SHADOW E&M-EST. PATIENT-LVL III: CPT | Mod: PBBFAC,,, | Performed by: OBSTETRICS & GYNECOLOGY

## 2019-02-18 PROCEDURE — 0502F PR SUBSEQUENT PRENATAL CARE: ICD-10-PCS | Mod: CPTII,S$GLB,, | Performed by: OBSTETRICS & GYNECOLOGY

## 2019-02-18 PROCEDURE — 0502F SUBSEQUENT PRENATAL CARE: CPT | Mod: CPTII,S$GLB,, | Performed by: OBSTETRICS & GYNECOLOGY

## 2019-02-18 PROCEDURE — 99999 PR PBB SHADOW E&M-EST. PATIENT-LVL III: ICD-10-PCS | Mod: PBBFAC,,, | Performed by: OBSTETRICS & GYNECOLOGY

## 2019-02-18 NOTE — PROGRESS NOTES
Vaginal bleeding after sex has resolved.    AP: 37 yo now  female @ 14.5 wks (EDC 19) who presents for f/u OB visit, IVF patient with transfer of Day 5 embryo on 2018 - EDC Aug 14, 2019.    H/o LSC cholecystectomy  No h/o STDs   to female partner.    1) SIUP  -s/p official US  -Rh positive  -pap uptodate    2) AMA: wants mat 21 (ordered)    3) Obesity    4) Hypothyroid  Continue synthroid 25mcg PO daily    5) UTI - s/p txment    F/u in 1 wks. BP check    babak nolen MD

## 2019-02-28 ENCOUNTER — ROUTINE PRENATAL (OUTPATIENT)
Dept: OBSTETRICS AND GYNECOLOGY | Facility: CLINIC | Age: 39
End: 2019-02-28
Payer: COMMERCIAL

## 2019-02-28 VITALS — BODY MASS INDEX: 53.71 KG/M2 | DIASTOLIC BLOOD PRESSURE: 88 MMHG | SYSTOLIC BLOOD PRESSURE: 132 MMHG | WEIGHT: 293 LBS

## 2019-02-28 DIAGNOSIS — Z34.02 ENCOUNTER FOR SUPERVISION OF NORMAL FIRST PREGNANCY IN SECOND TRIMESTER: Primary | ICD-10-CM

## 2019-02-28 DIAGNOSIS — Z3A.16 16 WEEKS GESTATION OF PREGNANCY: ICD-10-CM

## 2019-02-28 PROCEDURE — 0502F SUBSEQUENT PRENATAL CARE: CPT | Mod: CPTII,S$GLB,, | Performed by: OBSTETRICS & GYNECOLOGY

## 2019-02-28 PROCEDURE — 99999 PR PBB SHADOW E&M-EST. PATIENT-LVL II: CPT | Mod: PBBFAC,,, | Performed by: OBSTETRICS & GYNECOLOGY

## 2019-02-28 PROCEDURE — 99999 PR PBB SHADOW E&M-EST. PATIENT-LVL II: ICD-10-PCS | Mod: PBBFAC,,, | Performed by: OBSTETRICS & GYNECOLOGY

## 2019-02-28 PROCEDURE — 0502F PR SUBSEQUENT PRENATAL CARE: ICD-10-PCS | Mod: CPTII,S$GLB,, | Performed by: OBSTETRICS & GYNECOLOGY

## 2019-02-28 NOTE — PROGRESS NOTES
Vaginal discharge - unchanged from previous visits.  No vaginal bleeding anymore.  Feeling tired    AP: 39 yo now  female @ 16.1 wks (EDC 19) who presents for f/u OB visit, IVF patient with transfer of Day 5 embryo on 2018 - EDC Aug 14, 2019.    H/o LSC cholecystectomy  No h/o STDs   to female partner.    1) SIUP  -s/p official US  -Rh positive  -pap uptodate    2) AMA: wants mat 21 (ordered)    3) Obesity    4) Hypothyroid  Continue synthroid 25mcg PO daily    5) UTI - s/p txment    F/u in 3 wks for anatomy scan and OB visit    babak nolen MD

## 2019-03-01 LAB
CFDNA.FET/CFDNA.TOTAL SFR FETUS: NORMAL %
CITATION REF LAB TEST: NORMAL
FET CHR 13 TS PLAS.CFDNA QL: NEGATIVE
FET CHR 18 TS PLAS.CFDNA QL: NEGATIVE
FET CHR 21 TS PLAS.CFDNA QL: NEGATIVE
FET Y CHROM PLAS.CFDNA QL: DETECTED
FET Y CHROM PLAS.CFDNA: NORMAL
INTERPRETATION: NORMAL
LAB DIRECTOR NAME PROVIDER: NORMAL
LABORATORY COMMENT REPORT: NORMAL
LIMITATIONS:: NORMAL
NOTE: NORMAL
REF LAB TEST METHOD: NORMAL
SERVICE CMNT 02-IMP: NORMAL
SERVICE CMNT 03-IMP: NORMAL
SERVICE CMNT-IMP: NORMAL
SPECIMEN STATUS REPORT: NORMAL
TEST PERFORMANCE INFO SPEC: NORMAL
TEST PERFORMANCE INFO SPEC: NORMAL

## 2019-03-11 ENCOUNTER — TELEPHONE (OUTPATIENT)
Dept: OBSTETRICS AND GYNECOLOGY | Facility: CLINIC | Age: 39
End: 2019-03-11

## 2019-03-11 NOTE — TELEPHONE ENCOUNTER
I spoke with her and gave her the results of the MT 21 test, she states she wants to know the sex but her partner would be upset so she declines to know.  I explained that we would not send a copy of the report since it has the sex of the baby.  She was agreeable to this and had no further questions.

## 2019-03-22 ENCOUNTER — ROUTINE PRENATAL (OUTPATIENT)
Dept: OBSTETRICS AND GYNECOLOGY | Facility: CLINIC | Age: 39
End: 2019-03-22
Payer: COMMERCIAL

## 2019-03-22 ENCOUNTER — PROCEDURE VISIT (OUTPATIENT)
Dept: OBSTETRICS AND GYNECOLOGY | Facility: CLINIC | Age: 39
End: 2019-03-22
Payer: COMMERCIAL

## 2019-03-22 VITALS
BODY MASS INDEX: 53.06 KG/M2 | DIASTOLIC BLOOD PRESSURE: 82 MMHG | SYSTOLIC BLOOD PRESSURE: 138 MMHG | WEIGHT: 290.13 LBS

## 2019-03-22 DIAGNOSIS — Z3A.19 19 WEEKS GESTATION OF PREGNANCY: ICD-10-CM

## 2019-03-22 DIAGNOSIS — Z34.02 ENCOUNTER FOR SUPERVISION OF NORMAL FIRST PREGNANCY IN SECOND TRIMESTER: ICD-10-CM

## 2019-03-22 DIAGNOSIS — Z3A.16 16 WEEKS GESTATION OF PREGNANCY: ICD-10-CM

## 2019-03-22 DIAGNOSIS — Z34.02 ENCOUNTER FOR SUPERVISION OF NORMAL FIRST PREGNANCY IN SECOND TRIMESTER: Primary | ICD-10-CM

## 2019-03-22 PROCEDURE — 99999 PR PBB SHADOW E&M-EST. PATIENT-LVL III: ICD-10-PCS | Mod: PBBFAC,,, | Performed by: OBSTETRICS & GYNECOLOGY

## 2019-03-22 PROCEDURE — 0502F SUBSEQUENT PRENATAL CARE: CPT | Mod: CPTII,S$GLB,, | Performed by: OBSTETRICS & GYNECOLOGY

## 2019-03-22 PROCEDURE — 99999 PR PBB SHADOW E&M-EST. PATIENT-LVL III: CPT | Mod: PBBFAC,,, | Performed by: OBSTETRICS & GYNECOLOGY

## 2019-03-22 PROCEDURE — 76805 OB US >/= 14 WKS SNGL FETUS: CPT | Mod: S$GLB,,, | Performed by: OBSTETRICS & GYNECOLOGY

## 2019-03-22 PROCEDURE — 0502F PR SUBSEQUENT PRENATAL CARE: ICD-10-PCS | Mod: CPTII,S$GLB,, | Performed by: OBSTETRICS & GYNECOLOGY

## 2019-03-22 PROCEDURE — 76805 PR US, OB 14+WKS, TRANSABD, SINGLE GESTATION: ICD-10-PCS | Mod: S$GLB,,, | Performed by: OBSTETRICS & GYNECOLOGY

## 2019-03-22 NOTE — PROGRESS NOTES
No OB complaints today.    AP: 37 yo now  female @ 19.2 wks (EDC 19) who presents for f/u OB visit, IVF patient with transfer of Day 5 embryo on 2018 - EDC Aug 14, 2019.    H/o LSC cholecystectomy  No h/o STDs   to female partner.    1) SIUP (it's a boy)  -suboptimal view of anatomy   -Rh positive  -pap uptodate  -consents for vag/blood/circ signed 3/22/19    2) AMA: mat 21 neg XY    3) Obesity    4) Hypothyroid  Continue synthroid 25mcg PO daily    5) UTI - s/p txment    F/u in 3 wks for anatomy scan with MFM  F/u with Dr nolen in 5 wks     babak nolen MD

## 2019-04-11 ENCOUNTER — HOSPITAL ENCOUNTER (INPATIENT)
Facility: OTHER | Age: 39
LOS: 1 days | Discharge: HOME | End: 2019-04-12
Attending: OBSTETRICS & GYNECOLOGY | Admitting: OBSTETRICS & GYNECOLOGY
Payer: COMMERCIAL

## 2019-04-11 ENCOUNTER — ANESTHESIA EVENT (OUTPATIENT)
Dept: OBSTETRICS AND GYNECOLOGY | Facility: OTHER | Age: 39
End: 2019-04-11
Payer: COMMERCIAL

## 2019-04-11 ENCOUNTER — PATIENT MESSAGE (OUTPATIENT)
Dept: OBSTETRICS AND GYNECOLOGY | Facility: CLINIC | Age: 39
End: 2019-04-11

## 2019-04-11 ENCOUNTER — PROCEDURE VISIT (OUTPATIENT)
Dept: MATERNAL FETAL MEDICINE | Facility: HOSPITAL | Age: 39
End: 2019-04-11
Payer: COMMERCIAL

## 2019-04-11 ENCOUNTER — ANESTHESIA (OUTPATIENT)
Dept: OBSTETRICS AND GYNECOLOGY | Facility: OTHER | Age: 39
End: 2019-04-11
Payer: COMMERCIAL

## 2019-04-11 ENCOUNTER — ROUTINE PRENATAL (OUTPATIENT)
Dept: OBSTETRICS AND GYNECOLOGY | Facility: CLINIC | Age: 39
End: 2019-04-11
Payer: COMMERCIAL

## 2019-04-11 VITALS — DIASTOLIC BLOOD PRESSURE: 82 MMHG | SYSTOLIC BLOOD PRESSURE: 138 MMHG | WEIGHT: 287 LBS | BODY MASS INDEX: 52.49 KG/M2

## 2019-04-11 VITALS
DIASTOLIC BLOOD PRESSURE: 89 MMHG | BODY MASS INDEX: 52.78 KG/M2 | SYSTOLIC BLOOD PRESSURE: 138 MMHG | WEIGHT: 288.56 LBS

## 2019-04-11 DIAGNOSIS — O09.812 PREGNANCY RESULTING FROM ASSISTED REPRODUCTIVE TECHNOLOGY IN SECOND TRIMESTER: ICD-10-CM

## 2019-04-11 DIAGNOSIS — R11.0 NAUSEA: ICD-10-CM

## 2019-04-11 DIAGNOSIS — Z3A.22 22 WEEKS GESTATION OF PREGNANCY: ICD-10-CM

## 2019-04-11 DIAGNOSIS — Z34.02 ENCOUNTER FOR SUPERVISION OF NORMAL FIRST PREGNANCY IN SECOND TRIMESTER: ICD-10-CM

## 2019-04-11 DIAGNOSIS — O99.891 CURRENT MATERNAL CONDITION AFFECTING PREGNANCY: ICD-10-CM

## 2019-04-11 DIAGNOSIS — O34.32 PREMATURE CERVICAL DILATION IN SECOND TRIMESTER: ICD-10-CM

## 2019-04-11 DIAGNOSIS — Z36.3 ENCOUNTER FOR ANTENATAL SCREENING FOR MALFORMATION: ICD-10-CM

## 2019-04-11 DIAGNOSIS — Z3A.22 22 WEEKS GESTATION OF PREGNANCY: Primary | ICD-10-CM

## 2019-04-11 DIAGNOSIS — Z36.4 ANTENATAL SCREENING FOR FETAL GROWTH RETARDATION USING ULTRASONICS: ICD-10-CM

## 2019-04-11 DIAGNOSIS — N88.3 SHORT CERVIX: ICD-10-CM

## 2019-04-11 DIAGNOSIS — O09.522 ELDERLY MULTIGRAVIDA IN SECOND TRIMESTER: ICD-10-CM

## 2019-04-11 DIAGNOSIS — Z3A.19 19 WEEKS GESTATION OF PREGNANCY: ICD-10-CM

## 2019-04-11 PROCEDURE — 99215 OFFICE O/P EST HI 40 MIN: CPT | Mod: 25,,, | Performed by: OBSTETRICS & GYNECOLOGY

## 2019-04-11 PROCEDURE — 25000003 PHARM REV CODE 250: Performed by: ANESTHESIOLOGY

## 2019-04-11 PROCEDURE — 0502F PR SUBSEQUENT PRENATAL CARE: ICD-10-PCS | Mod: CPTII,S$GLB,, | Performed by: OBSTETRICS & GYNECOLOGY

## 2019-04-11 PROCEDURE — 11000001 HC ACUTE MED/SURG PRIVATE ROOM

## 2019-04-11 PROCEDURE — 99285 EMERGENCY DEPT VISIT HI MDM: CPT | Mod: 25

## 2019-04-11 PROCEDURE — 76816 PR  US,PREGNANT UTERUS,F/U,TRANSABD APP: ICD-10-PCS | Mod: 26,,, | Performed by: OBSTETRICS & GYNECOLOGY

## 2019-04-11 PROCEDURE — 99215 PR OFFICE/OUTPT VISIT, EST, LEVL V, 40-54 MIN: ICD-10-PCS | Mod: 25,,, | Performed by: OBSTETRICS & GYNECOLOGY

## 2019-04-11 PROCEDURE — 59409 PRA ETRICAL CARE,VAG DELIV ONLY: ICD-10-PCS | Mod: QY,,, | Performed by: ANESTHESIOLOGY

## 2019-04-11 PROCEDURE — 76817 TRANSVAGINAL US OBSTETRIC: CPT | Mod: 26,,, | Performed by: OBSTETRICS & GYNECOLOGY

## 2019-04-11 PROCEDURE — 25000003 PHARM REV CODE 250: Performed by: STUDENT IN AN ORGANIZED HEALTH CARE EDUCATION/TRAINING PROGRAM

## 2019-04-11 PROCEDURE — 76817 PR US, OB, TRANSVAG APPROACH: ICD-10-PCS | Mod: 26,,, | Performed by: OBSTETRICS & GYNECOLOGY

## 2019-04-11 PROCEDURE — 76816 OB US FOLLOW-UP PER FETUS: CPT

## 2019-04-11 PROCEDURE — 76817 TRANSVAGINAL US OBSTETRIC: CPT

## 2019-04-11 PROCEDURE — 99999 PR PBB SHADOW E&M-EST. PATIENT-LVL II: ICD-10-PCS | Mod: PBBFAC,,, | Performed by: OBSTETRICS & GYNECOLOGY

## 2019-04-11 PROCEDURE — 76816 OB US FOLLOW-UP PER FETUS: CPT | Mod: 26,,, | Performed by: OBSTETRICS & GYNECOLOGY

## 2019-04-11 PROCEDURE — 99999 PR PBB SHADOW E&M-EST. PATIENT-LVL II: CPT | Mod: PBBFAC,,, | Performed by: OBSTETRICS & GYNECOLOGY

## 2019-04-11 PROCEDURE — 0502F SUBSEQUENT PRENATAL CARE: CPT | Mod: CPTII,S$GLB,, | Performed by: OBSTETRICS & GYNECOLOGY

## 2019-04-11 PROCEDURE — 62326 NJX INTERLAMINAR LMBR/SAC: CPT | Performed by: ANESTHESIOLOGY

## 2019-04-11 PROCEDURE — 59409 OBSTETRICAL CARE: CPT | Mod: QY,,, | Performed by: ANESTHESIOLOGY

## 2019-04-11 RX ORDER — OXYTOCIN/RINGER'S LACTATE 20/1000 ML
41.7 PLASTIC BAG, INJECTION (ML) INTRAVENOUS CONTINUOUS
Status: DISPENSED | OUTPATIENT
Start: 2019-04-11 | End: 2019-04-12

## 2019-04-11 RX ORDER — SODIUM CHLORIDE 9 MG/ML
INJECTION, SOLUTION INTRAVENOUS
Status: DISCONTINUED | OUTPATIENT
Start: 2019-04-11 | End: 2019-04-12 | Stop reason: HOSPADM

## 2019-04-11 RX ORDER — SODIUM CHLORIDE, SODIUM LACTATE, POTASSIUM CHLORIDE, CALCIUM CHLORIDE 600; 310; 30; 20 MG/100ML; MG/100ML; MG/100ML; MG/100ML
INJECTION, SOLUTION INTRAVENOUS CONTINUOUS
Status: DISCONTINUED | OUTPATIENT
Start: 2019-04-11 | End: 2019-04-12 | Stop reason: HOSPADM

## 2019-04-11 RX ORDER — MISOPROSTOL 200 UG/1
600 TABLET ORAL
Status: CANCELLED | OUTPATIENT
Start: 2019-04-11

## 2019-04-11 RX ORDER — BUPIVACAINE HYDROCHLORIDE 2.5 MG/ML
INJECTION, SOLUTION EPIDURAL; INFILTRATION; INTRACAUDAL
Status: DISCONTINUED | OUTPATIENT
Start: 2019-04-11 | End: 2019-04-11

## 2019-04-11 RX ORDER — FENTANYL/BUPIVACAINE/NS/PF 2MCG/ML-.1
PLASTIC BAG, INJECTION (ML) INJECTION
Status: DISCONTINUED | OUTPATIENT
Start: 2019-04-11 | End: 2019-04-11

## 2019-04-11 RX ORDER — FENTANYL/BUPIVACAINE/NS/PF 2MCG/ML-.1
PLASTIC BAG, INJECTION (ML) INJECTION
Status: COMPLETED
Start: 2019-04-11 | End: 2019-04-11

## 2019-04-11 RX ORDER — OXYTOCIN/RINGER'S LACTATE 20/1000 ML
333 PLASTIC BAG, INJECTION (ML) INTRAVENOUS CONTINUOUS
Status: ACTIVE | OUTPATIENT
Start: 2019-04-11 | End: 2019-04-11

## 2019-04-11 RX ORDER — BUPIVACAINE HYDROCHLORIDE 2.5 MG/ML
INJECTION, SOLUTION INFILTRATION; PERINEURAL
Status: DISCONTINUED | OUTPATIENT
Start: 2019-04-11 | End: 2019-04-11

## 2019-04-11 RX ORDER — OXYTOCIN 10 [USP'U]/ML
INJECTION, SOLUTION INTRAMUSCULAR; INTRAVENOUS
Status: DISPENSED
Start: 2019-04-11 | End: 2019-04-12

## 2019-04-11 RX ORDER — BUPIVACAINE HYDROCHLORIDE 2.5 MG/ML
INJECTION, SOLUTION EPIDURAL; INFILTRATION; INTRACAUDAL
Status: COMPLETED
Start: 2019-04-11 | End: 2019-04-11

## 2019-04-11 RX ORDER — ONDANSETRON 8 MG/1
8 TABLET, ORALLY DISINTEGRATING ORAL EVERY 8 HOURS PRN
Status: DISCONTINUED | OUTPATIENT
Start: 2019-04-11 | End: 2019-04-12 | Stop reason: HOSPADM

## 2019-04-11 RX ORDER — FENTANYL CITRATE 50 UG/ML
INJECTION, SOLUTION INTRAMUSCULAR; INTRAVENOUS
Status: DISCONTINUED
Start: 2019-04-11 | End: 2019-04-11 | Stop reason: WASHOUT

## 2019-04-11 RX ORDER — FENTANYL/BUPIVACAINE/NS/PF 2MCG/ML-.1
PLASTIC BAG, INJECTION (ML) INJECTION CONTINUOUS PRN
Status: DISCONTINUED | OUTPATIENT
Start: 2019-04-11 | End: 2019-04-11

## 2019-04-11 RX ADMIN — ONDANSETRON 8 MG: 8 TABLET, ORALLY DISINTEGRATING ORAL at 10:04

## 2019-04-11 RX ADMIN — Medication 10 ML/HR: at 09:04

## 2019-04-11 RX ADMIN — BUPIVACAINE HYDROCHLORIDE 1 ML: 2.5 INJECTION, SOLUTION EPIDURAL; INFILTRATION; INTRACAUDAL; PERINEURAL at 09:04

## 2019-04-11 RX ADMIN — SODIUM CHLORIDE, SODIUM LACTATE, POTASSIUM CHLORIDE, AND CALCIUM CHLORIDE: .6; .31; .03; .02 INJECTION, SOLUTION INTRAVENOUS at 10:04

## 2019-04-11 RX ADMIN — Medication 5 ML: at 09:04

## 2019-04-11 NOTE — PROGRESS NOTES
Patient sent from [facility] Dr Shaw's clinic today  During Ultrasound evaluation , short and dilated cervix was found  With what appeared to be fetal membranes protruding through os   Here today for visual cervical examination   Refers No bleeding, cramping or LOF .  Medium size speculum inserted in the vagina and cervix is  Seen   Dilated at least 2.5 cms, membranes exposed , intact and not protruding through the os .   Patient tolerated well the examination .     Findings communicated to Dr Shaw , who will decide further treatment plan .  Patient to go down to [facility] clinic at this time     George Negron M.D.   OB/GYN    4/11/2019

## 2019-04-12 VITALS
HEIGHT: 62 IN | DIASTOLIC BLOOD PRESSURE: 71 MMHG | HEART RATE: 69 BPM | TEMPERATURE: 97 F | SYSTOLIC BLOOD PRESSURE: 132 MMHG | OXYGEN SATURATION: 95 % | BODY MASS INDEX: 53.1 KG/M2 | RESPIRATION RATE: 18 BRPM | WEIGHT: 288.56 LBS

## 2019-04-12 PROCEDURE — 51702 INSERT TEMP BLADDER CATH: CPT

## 2019-04-12 PROCEDURE — 25000003 PHARM REV CODE 250: Performed by: OBSTETRICS & GYNECOLOGY

## 2019-04-12 PROCEDURE — 88305 TISSUE SPECIMEN TO PATHOLOGY: ICD-10-PCS | Mod: 26,,, | Performed by: PATHOLOGY

## 2019-04-12 PROCEDURE — 88305 TISSUE EXAM BY PATHOLOGIST: CPT | Performed by: PATHOLOGY

## 2019-04-12 PROCEDURE — 72100002 HC LABOR CARE, 1ST 8 HOURS

## 2019-04-12 PROCEDURE — 88305 TISSUE EXAM BY PATHOLOGIST: CPT | Mod: 26,,, | Performed by: PATHOLOGY

## 2019-04-12 PROCEDURE — 72200004 HC VAGINAL DELIVERY LEVEL I

## 2019-04-12 PROCEDURE — 96372 THER/PROPH/DIAG INJ SC/IM: CPT

## 2019-04-12 RX ORDER — LEVOTHYROXINE SODIUM 25 UG/1
25 TABLET ORAL DAILY
Status: DISCONTINUED | OUTPATIENT
Start: 2019-04-12 | End: 2019-04-12 | Stop reason: HOSPADM

## 2019-04-12 RX ORDER — DIPHENHYDRAMINE HCL 25 MG
25 CAPSULE ORAL EVERY 4 HOURS PRN
Status: DISCONTINUED | OUTPATIENT
Start: 2019-04-12 | End: 2019-04-12 | Stop reason: HOSPADM

## 2019-04-12 RX ORDER — OXYTOCIN/RINGER'S LACTATE 20/1000 ML
41.65 PLASTIC BAG, INJECTION (ML) INTRAVENOUS CONTINUOUS
Status: ACTIVE | OUTPATIENT
Start: 2019-04-12 | End: 2019-04-12

## 2019-04-12 RX ORDER — IBUPROFEN 600 MG/1
600 TABLET ORAL EVERY 6 HOURS
Qty: 30 TABLET | Refills: 0 | Status: SHIPPED | OUTPATIENT
Start: 2019-04-12 | End: 2019-05-20

## 2019-04-12 RX ORDER — IBUPROFEN 600 MG/1
600 TABLET ORAL EVERY 6 HOURS
Status: DISCONTINUED | OUTPATIENT
Start: 2019-04-12 | End: 2019-04-12 | Stop reason: HOSPADM

## 2019-04-12 RX ORDER — ACETAMINOPHEN 325 MG/1
650 TABLET ORAL EVERY 6 HOURS PRN
Status: DISCONTINUED | OUTPATIENT
Start: 2019-04-12 | End: 2019-04-12 | Stop reason: HOSPADM

## 2019-04-12 RX ORDER — DIPHENHYDRAMINE HYDROCHLORIDE 50 MG/ML
25 INJECTION INTRAMUSCULAR; INTRAVENOUS EVERY 4 HOURS PRN
Status: DISCONTINUED | OUTPATIENT
Start: 2019-04-12 | End: 2019-04-12 | Stop reason: HOSPADM

## 2019-04-12 RX ORDER — HYDROCODONE BITARTRATE AND ACETAMINOPHEN 5; 325 MG/1; MG/1
1 TABLET ORAL EVERY 4 HOURS PRN
Status: DISCONTINUED | OUTPATIENT
Start: 2019-04-12 | End: 2019-04-12 | Stop reason: HOSPADM

## 2019-04-12 RX ORDER — DOCUSATE SODIUM 100 MG/1
200 CAPSULE, LIQUID FILLED ORAL 2 TIMES DAILY PRN
Status: DISCONTINUED | OUTPATIENT
Start: 2019-04-12 | End: 2019-04-12 | Stop reason: HOSPADM

## 2019-04-12 RX ORDER — HYDROCODONE BITARTRATE AND ACETAMINOPHEN 10; 325 MG/1; MG/1
1 TABLET ORAL EVERY 4 HOURS PRN
Status: DISCONTINUED | OUTPATIENT
Start: 2019-04-12 | End: 2019-04-12 | Stop reason: HOSPADM

## 2019-04-12 RX ORDER — ONDANSETRON 8 MG/1
8 TABLET, ORALLY DISINTEGRATING ORAL EVERY 8 HOURS PRN
Status: DISCONTINUED | OUTPATIENT
Start: 2019-04-12 | End: 2019-04-12 | Stop reason: HOSPADM

## 2019-04-12 RX ADMIN — LEVOTHYROXINE SODIUM 25 MCG: 25 TABLET ORAL at 11:04

## 2019-04-12 RX ADMIN — IBUPROFEN 600 MG: 600 TABLET ORAL at 08:04

## 2019-04-12 NOTE — NURSING
Discharge instructions and follow up reviewed with patient and partner. They deny questions, state understanding. Pt discharged home.

## 2019-04-12 NOTE — DISCHARGE SUMMARY
Delivery Discharge Summary  Obstetrics      Primary OB Clinician: Elaine Grullon MD      Admission date: 2019  Discharge date: 2019    Disposition: To home, self care    Discharge Diagnosis List:      Patient Active Problem List   Diagnosis    PCOS (polycystic ovarian syndrome)    Irregular bleeding    Obesity    Hirsutism    Keratoconus, unspecified    Keratoconus    Nuclear cataract    Corneal graft rejection    History of corneal transplant     delivery       Procedure:  of fetal demise    Hospital Course:  Zaida Chung is a 38 y.o. now , PPD #1 s/p  of a fetal demise who was admitted on 2019 at 22w1d for  labor. Patient was subsequently admitted to labor and delivery unit with signed consents.     Labor course was uncomplicated and resulted in  without complications.     Please see delivery note for further details. Her postpartum course was uncomplicated. On discharge day, patient's pain is controlled with oral pain medications. Pt is tolerating ambulation without SOB or CP, and regular diet without N/V. Reports lochia is mild. Denies any HA, vision changes, F/C, LE swelling. Denies any breast pain/soreness.    Pt in stable condition and ready for discharge. She has been instructed to start and/or continue medications and follow up with her obstetrics provider as listed below.    Pertinent studies:  CBC  No results for input(s): WBC, HGB, HCT, MCV, PLT in the last 168 hours.         There is no immunization history on file for this patient.     Delivery:    Episiotomy: None   Lacerations: None   Repair suture: None   Repair # of packets: 0   Blood loss (ml): 75     Birth information:  YOB: 2019   Time of birth: 10:37 PM   Sex: male   Delivery type: Vaginal, Spontaneous   Gestational Age: 22w1d    Delivery Clinician:      Other providers:       Additional  information:  Forceps:    Vacuum:    Breech:    Observed anomalies       Living?:           APGARS  One minute Five minutes Ten minutes   Skin color:         Heart rate:         Grimace:         Muscle tone:         Breathing:         Totals: 0  0  0      Placenta: Delivered:       appearance      Patient Instructions:   Current Discharge Medication List      START taking these medications    Details   ibuprofen (ADVIL,MOTRIN) 600 MG tablet Take 1 tablet (600 mg total) by mouth every 6 (six) hours.  Qty: 30 tablet, Refills: 0         CONTINUE these medications which have NOT CHANGED    Details   levothyroxine (SYNTHROID) 25 MCG tablet Take 25 mcg by mouth once daily.         STOP taking these medications       DICLEGIS 10-10 mg TbEC Comments:   Reason for Stopping:         metoclopramide HCl (REGLAN) 5 MG tablet Comments:   Reason for Stopping:         prenatal vit no.129/iron/folic (PRENATAL ONE DAILY ORAL) Comments:   Reason for Stopping:               Discharge Procedure Orders   Diet Adult Regular     Other restrictions (specify):   Order Comments: Pelvic rest for at least 6 weeks. Nothing in vagina - no sex, tampons, or douching for 6 weeks     Notify your health care provider if you experience any of the following:   Order Comments: Heavy vaginal bleeding saturating more than 1 pad per hour for at least 2 hours.     Notify your health care provider if you experience any of the following:  increased confusion or weakness     Notify your health care provider if you experience any of the following:  persistent dizziness, light-headedness, or visual disturbances     Notify your health care provider if you experience any of the following:  severe persistent headache     Notify your health care provider if you experience any of the following:  difficulty breathing or increased cough     Notify your health care provider if you experience any of the following:  severe uncontrolled pain     Notify your health care provider if you experience any of the following:  persistent nausea and  vomiting or diarrhea     Notify your health care provider if you experience any of the following:  temperature >100.4     Activity as tolerated       Follow-up Information     Elaine Grullon MD. Schedule an appointment as soon as possible for a visit in 6 weeks.    Specialties:  Obstetrics, Obstetrics and Gynecology  Why:  Postpartum visit  Contact information:  [address] [address]  [phone]                    Sonja Beatty MD  OBGYN PGY-1

## 2019-04-12 NOTE — H&P
"   HISTORY AND PHYSICAL                                                OBSTETRICS          Subjective:      Zaida Chung is a 38 y.o. [id number] at 22w1d presented to the [facility] from [place] stating that her doctor told her that her "cervix is open". M was consulted and accepted patient for transfer for possible rescue cerclage in the morning. Patient now also complains of vaginal bleeding and contractions that began shortly after leaving the clinic. She states that contractions are about every 3 minutes. She and her partner say that she has been "pouring blood" and has passed large clots. This IUP is complicated by IVF, PCOS, obesity, AMA, and hypothyroidism.      In the [facility], patient's cervix changed from 2 to 4cm with SROM. She was painfully jacqueline every 3 minutes. Patient was admitted to L&D for management of  labor.      Review of Systems   Constitutional: Negative for activity change, appetite change, chills, fatigue and fever.   HENT: Negative for congestion and rhinorrhea.    Eyes: Negative for visual disturbance.   Respiratory: Negative for shortness of breath.    Cardiovascular: Negative for chest pain and palpitations.   Gastrointestinal: Positive for abdominal pain. Negative for diarrhea, nausea and vomiting.   Genitourinary: Positive for vaginal bleeding. Negative for dysuria and pelvic pain.   Musculoskeletal: Positive for back pain.   Skin: Negative for rash.   Neurological: Negative for dizziness, light-headedness and headaches.   Psychiatric/Behavioral: Negative for agitation. The patient is nervous/anxious.        PMHx:   Past Medical History:   Diagnosis Date    Nuclear cataract 2017       PSHx:   Past Surgical History:   Procedure Laterality Date    ABDOMINAL SURGERY      BREAST BIOPSY      CHOLECYSTECTOMY      CORNEAL TRANSPLANT Left 16    Dr Sharp    CORNEAL TRANSPLANT Right 2017    Dr. Sharp    DILATION AND CURETTAGE OF UTERUS      juanjose N/A " 04/2015    HERNIA REPAIR      HIATAL HERNIA REPAIR  2015    STOMACH SURGERY  Gastric sleeve    TRANSPLANT-CORNEA Right 1/12/2017    [facility]    TRANSPLANT-CORNEA/PKP Left 1/21/2016    [facility]    TRANSPLANT-DSAEK CORNEAL Left 5/10/2018    [facility] OR       All:   Review of patient's allergies indicates:   Allergen Reactions    Penicillins Anaphylaxis       Meds:   Medications Prior to Admission   Medication Sig Dispense Refill Last Dose    DICLEGIS 10-10 mg TbEC Take 2 tablets by mouth every evening. 60 tablet 2 Taking    levothyroxine (SYNTHROID) 25 MCG tablet Take 25 mcg by mouth once daily.   Taking    metoclopramide HCl (REGLAN) 5 MG tablet Take 1 tablet (5 mg total) by mouth 3 (three) times daily. 90 tablet 1 Taking    prenatal vit no.129/iron/folic (PRENATAL ONE DAILY ORAL) Take by mouth.   Taking       SH:   Social History     Socioeconomic History    Marital status:      Spouse name: Not on file    Number of children: Not on file    Years of education: Not on file    Highest education level: Not on file   Occupational History    Not on file   Social Needs    Financial resource strain: Not on file    Food insecurity:     Worry: Not on file     Inability: Not on file    Transportation needs:     Medical: Not on file     Non-medical: Not on file   Tobacco Use    Smoking status: Never Smoker    Smokeless tobacco: Never Used   Substance and Sexual Activity    Alcohol use: Yes    Drug use: No    Sexual activity: Yes     Partners: Female     Birth control/protection: None   Lifestyle    Physical activity:     Days per week: Not on file     Minutes per session: Not on file    Stress: Not on file   Relationships    Social connections:     Talks on phone: Not on file     Gets together: Not on file     Attends [employer] witness service: Not on file     Active member of club or organization: Not on  "file     Attends meetings of clubs or organizations: Not on file     Relationship status: Not on file   Other Topics Concern    Not on file   Social History Narrative    Not on file       FH:   Family History   Problem Relation Age of Onset    Breast cancer Maternal Grandmother     Diabetes Mother     Hypertension Mother     Glaucoma Mother     Breast cancer Maternal Aunt     Breast cancer Paternal Aunt     No Known Problems Father     No Known Problems Sister     No Known Problems Brother     No Known Problems Maternal Uncle     No Known Problems Paternal Uncle     No Known Problems Paternal Grandmother     No Known Problems Paternal Grandfather     Breast cancer Maternal Aunt     Breast cancer Maternal Aunt     Breast cancer Maternal Aunt     Breast cancer Maternal Aunt     Amblyopia Neg Hx     Blindness Neg Hx     Cancer Neg Hx     Cataracts Neg Hx     Macular degeneration Neg Hx     Retinal detachment Neg Hx     Strabismus Neg Hx     Stroke Neg Hx     Thyroid disease Neg Hx        OBHx:   OB History    Para Term  AB Living   1 0 0 0 0 0   SAB TAB Ectopic Multiple Live Births   0 0 0 0 0      # Outcome Date GA Lbr Marino/2nd Weight Sex Delivery Anes PTL Lv   1 Current                Objective:       /66   Pulse 67   Temp 96.8 °F (36 °C) (Temporal)   Resp 18   Ht 5' 2" (1.575 m)   Wt 130.9 kg (288 lb 9.3 oz)   LMP 2018 (Exact Date)   SpO2 100%   Breastfeeding? No   BMI 52.78 kg/m²     Vitals:    19 0028 19 0030 19 0207 19 0224   BP: 129/63 130/66     Pulse: 72 69 67    Resp:   18    Temp:   96.8 °F (36 °C)    TempSrc:   Temporal    SpO2:       Weight:    130.9 kg (288 lb 9.3 oz)   Height:    5' 2" (1.575 m)       General:   alert, appears stated age and cooperative, in distress with contractions   HENT:  normocephalic, atraumatic   Eyes:  extraocular movements and conjunctivae normal   Neck:  supple, range of motion normal, no " thyromegaly   Lungs:   no respiratory distress   Heart:   regular rate   Abdomen:  soft, non-tender, non-distended but gravid, no rebound or guarding   Extremities negative edema, negative erythema   FHT: +++                 TOCO: Q 3 minutes on exam   Presentations: breech by ultrasound   Cervix:     Dilation: 4cm    Effacement: 80%    Station:  -2    Consistency: medium    Position: anterior   Sterile Speculum Exam: Moderate amount of dark red blood and clot in the vaginal vault. Active bleeding noted at os with membranes visible.        Assessment:      at 22w1d weeks gestation in  labor.    Active Hospital Problems    Diagnosis  POA    * delivery [O60.10X0]  Yes      Resolved Hospital Problems   No resolved problems to display.          Plan:   1.  labor:  - Risks, benefits, alternatives and possible complications have been discussed in detail with the patient. Patient counseled that fetus not considered viable at this gestational age.  - Consents signed and to chart  - Admit to Labor and Delivery unit  - Epidural per Anesthesia  - Draw CBC, T&S    2. Advanced maternal age    3. Morbid obesity:  - Encourage ambulation and use of TEDs/SCDs    4. Hypothyroidism:  - Continue home synthroid 25mcg      Rossi Garsia MD  OBGYN, PGY-1

## 2019-04-12 NOTE — L&D DELIVERY NOTE
[facility]  Vaginal Delivery   Operative Note    SUMMARY     Patient is a 37yo  with 22.1 week gestation who presented to [facility] in labor at 4cm, with  rupture of membranes shortly after arrival. After she was admitted to L&D, she received an epidural. Approximately 1.5 hours later she reported feeling pressure, and the fetus was delivering.      Normal spontaneous vaginal delivery of demised  infant,  no evidence of fetal life at time of birth.  Infant delivered vertex,  OA over intact perineum.  A loose nuchal cord was present and reduced following delivery. The cord was clamped and cut, and the fetus was passed to the mother.     The placenta was left in place for spontaneous delivery which took place after 20 minutes. IM pitocin was given noting good uterine tone.  No lacerations noted.  Patient tolerated delivery well. Sponge needle and lap counted correctly x2.    Indications:  delivery  Pregnancy complicated by:   Patient Active Problem List   Diagnosis    PCOS (polycystic ovarian syndrome)    Irregular bleeding    Obesity    Hirsutism    Keratoconus, unspecified    Keratoconus    Nuclear cataract    Corneal graft rejection    History of corneal transplant     delivery     Admitting GA: 22w1d    Delivery Information for  Boy Zaida Chung    Birth information:  YOB: 2019   Time of birth: 10:37 PM   Sex: male   Head Delivery Date/Time: 2019 10:37 PM   Delivery type: Vaginal, Spontaneous   Gestational Age: 22w1d    Delivery Providers    Delivering clinician:  Jocelyn Quinones MD   Provider Role    Landy Osullivan, RN Registered Nurse    Elal Zepeda, RN Registered Nurse            Measurements    Weight:  465 g  Length:  45.7 cm         Apgars    Living status:  Fetal Demise  Apgars:   1 min.:   5 min.:   10 min.:   15 min.:   20 min.:     Skin color:   0  0  0  0  0    Heart rate:   0  0  0  0  0    Reflex irritability:    0  0  0  0  0    Muscle tone:   0  0  0  0  0    Respiratory effort:   0  0  0  0  0    Total:   0  0  0  0  0           Operative Delivery    Forceps attempted?:  No  Vacuum extractor attempted?:  No         Shoulder Dystocia    Shoulder dystocia present?:  No           Presentation    Presentation:  Vertex           Interventions/Resuscitation    Method:  None       Cord    Vessels:  Unknown  Complications:  Nuchal  Nuchal Intervention:  reduced  Nuchal Cord Description:  loose nuchal cord  Number of Loops:  1  Delayed Cord Clamping?:  No  Gases Sent?:  No  Stem Cell Collection (by MD):  No       Placenta    Placenta delivery date/time:  2019  Placenta removal:  Spontaneous  Placenta appearance:  Intact  Placenta disposition:  pathology           Labor Events:       labor: Yes     Labor Onset Date/Time:         Dilation Complete Date/Time:         Start Pushing Date/Time:       Rupture Date/Time:              Rupture type:           Fluid Amount:        Fluid Color:        Fluid Odor:        Membrane Status (PeriCalm): PPROM (Premature Prolonged Rupture)      Rupture Date/Time (PeriCalm): 2019 20:02:00      Fluid Amount (PeriCalm): Moderate      Fluid Color (PeriCalm): Clear       steroids: None     Antibiotics given for GBS: No     Induction:       Indications for induction:  Fetal Demise     Augmentation:       Indications for augmentation:       Labor complications: None     Additional complications:          Cervical ripening:                     Delivery:      Episiotomy: None     Indication for Episiotomy:       Perineal Lacerations: None Repaired:      Periurethral Laceration:   Repaired:     Labial Laceration:   Repaired:     Sulcus Laceration:   Repaired:     Vaginal Laceration:   Repaired:     Cervical Laceration:   Repaired:     Repair suture: None     Repair # of packets: 0     Last Value - EBL - Nursing (mL): 75     Sum - EBL - Nursing (mL): 75     Last Value - EBL -  Anesthesia (mL):      Calculated QBL (mL):        Vaginal Sweep Performed: Yes     Surgicount Correct: No       Other providers:       Anesthesia    Method:  Epidural          Details (if applicable):  Trial of Labor      Categorization:      Priority:     Indications for :     Incision Type:       Additional  information:  Forceps:    Vacuum:    Breech:    Observed anomalies    Other (Comments):

## 2019-04-12 NOTE — ANESTHESIA POSTPROCEDURE EVALUATION
Anesthesia Post Evaluation    Patient: Zaida Chung    Procedure(s) Performed: * No procedures listed *    Final Anesthesia Type: spinal  Patient location during evaluation: floor  Patient participation: Yes- Able to Participate  Level of consciousness: awake and alert  Post-procedure vital signs: reviewed and stable  Pain management: adequate  Airway patency: patent  PONV status at discharge: No PONV  Anesthetic complications: no      Cardiovascular status: blood pressure returned to baseline and hemodynamically stable  Respiratory status: unassisted, spontaneous ventilation and room air  Hydration status: euvolemic  Follow-up not needed.          Vitals Value Taken Time   /71 4/12/2019 12:11 PM   Temp 36.2 °C (97.2 °F) 4/12/2019  7:37 AM   Pulse 69 4/12/2019 12:11 PM   Resp 18 4/12/2019 12:11 PM   SpO2 93 % 4/12/2019 12:10 PM   Vitals shown include unvalidated device data.      No case tracking events are documented in the log.      Pain/Edgard Score: Pain Rating Prior to Med Admin: 2 (4/12/2019  8:53 AM)  Pain Rating Post Med Admin: 0 (4/12/2019  9:23 AM)

## 2019-04-12 NOTE — PLAN OF CARE
Sw contacted by RN requesting for sw to see pt due to IUFD on yesterday. Sw visited with pt, pt's spouse and pt's brother at bedside. All were doing well and appropriately grieving. Sw discussed final arrangements. Pt and wife have chosen cremation. Sw educated them on the process of contacting the  to make the arrangements. Sw offered financial assistance, if needed. Pt and wife expressed gratitude. Sw provided emotional support and contact information. Sw available should any needs arise.    Valeria Alejo LCSW  NICU   Ext. [phone]-phone  [email or web address]

## 2019-04-12 NOTE — DISCHARGE INSTRUCTIONS
Stillbirth  Stillbirth is when a baby dies in the womb after 20 weeks or more of growing.    Coping with stillbirth  Stillbirth is very difficult for the parents, and often other family members. It can be more upsetting than an early miscarriage because the mother has felt the baby move. It can be very hard to go through labor, yet not have a baby to take home. Counseling is important for all parents coping with a stillbirth. It can help you understand your feelings and begin the work of grieving. Talk with your healthcare provider to be referred to a counselor with experience in pregnancy loss.    Grieving the loss of your child  Give yourself time to grieve the loss of your baby. There are ways to help you move through the grieving process. You may wish to hold and touch the baby. In a private room, a nurse or counselor will bring the baby to you, wrapped in a blanket. This can give you a real memory of your baby. Seeing your baby can be helpful when there is a birth defect. Sometimes, a parent may imagine a birth defect as much worse than the real problem. You may also wish to take photographs or footprints of your baby, or take a lock of hair to keep. You may wish to remember the baby with a memorial or  service. This can also help friends and other family members understand the loss that you have experienced.    Understanding Stillbirth: Emotions  When a stillbirth happens, youre likely to have a wide range of feelings. Allow yourself to accept how you feel. Only then can you begin to move on.  No one is to blame  Know that you did not cause this to happen.  Grief takes many forms  Grief may be the first thing you feel, or it may come upon you later. Perhaps youll grieve because the future you hoped for is lost. Grief is painful and often lonely. But your stillbirth should become easier to deal with over time.  What you feel is OK  No one can tell you how to respond to your miscarriage. If you have  been trying to have a child, this loss may feel overwhelming. Perhaps this was an unplanned pregnancy. That doesnt mean you wont feel loss. You know yourself best. Its OK to feel whatever you feel.  A sense of loss  No matter what you thought about being pregnant, having a miscarriage may cause a sense of loss. You may feel as if something is missing. Its OK if you cant describe how you feel. At first, it may be enough just to look inside yourself and feel your emotions.  Partners note  Partners grieve, too. You may be feeling sad, helpless or frustrated. When youre struggling with your own feelings, knowing how to help your partner may be hard. But do your best to provide support. The following tips may also help:  · Be kind to yourself and your partner.  · Spend time together.  · Fix a meal or bring dinner home for her.  · Rent a movie.     Home care  Suggestions for care at home include:  · Return to work or your daily routines when you feel ready. This might be right away, or you may want to wait a few days.  · Take showers instead of tub baths. This helps prevent infection. Ask your healthcare provider when you can take baths again.  · Avoid strenuous exercise, such as aerobics or running, until the bleeding slows to the rate of a normal period.  · Dont have sexual intercourse or use tampons or douches until your healthcare provider says its OK.  · Get emotional support. Ask your healthcare provider about support groups in your area. Many women find it helpful to talk to other women who have had a miscarriage.    Other sources of support  Many women find it easier to talk to people who are not family or friends. If this is true for you, try contacting the following:  · Share: Pregnancy and Infant Loss Support at http://www.nationalshare.org  · Resolve Through Sharing at http://www.bereavementservices.org  · Pregnancy Loss Support Program at http://www.pregnancyloss.org/    When to call your healthcare  provider  Call your healthcare provider right away if you have any of these:  · Fever of 100.4°F (38°C) or higher  · Pain  · Bleeding that fills a pad in an hour  · You continue to have no interest in eating or are not able to sleep.  · Your depression does not lessen or you get more upset.  · Other symptoms as advised

## 2019-04-12 NOTE — ED PROVIDER NOTES
"Encounter Date: 2019       History   No chief complaint on file.    Zaida Chung is a 38 y.o. [id number] at 22w1d presents from [place] stating that her doctor told her that her "cervix is open". MFM was consulted and accepted patient for transfer for possible rescue cerclage in the morning. Patient now also complains of vaginal bleeding and contractions that began shortly after leaving the clinic. She states that contractions are about every 3 minutes. She and her partner say that she has been "pouring blood" and has passed large clots. This IUP is complicated by IVF, PCOS, obesity, AMA, and hypothyroidism.            Review of patient's allergies indicates:   Allergen Reactions    Penicillins Anaphylaxis     Past Medical History:   Diagnosis Date    Nuclear cataract 2017     Past Surgical History:   Procedure Laterality Date    ABDOMINAL SURGERY      BREAST BIOPSY      CHOLECYSTECTOMY      CORNEAL TRANSPLANT Left 16    Dr Sharp    CORNEAL TRANSPLANT Right 2017    Dr. Sharp    DILATION AND CURETTAGE OF UTERUS      gallbaldder N/A 2015    HERNIA REPAIR      HIATAL HERNIA REPAIR      STOMACH SURGERY  Gastric sleeve    TRANSPLANT-CORNEA Right 2017    [facility]    TRANSPLANT-CORNEA/PKP Left 2016    [facility]    TRANSPLANT-DSAEK CORNEAL Left 5/10/2018    [facility] [place]     Family History   Problem Relation Age of Onset    Breast cancer Maternal Grandmother     Diabetes Mother     Hypertension Mother     Glaucoma Mother     Breast cancer Maternal Aunt     Breast cancer Paternal Aunt     No Known Problems Father     No Known Problems Sister     No Known Problems Brother     No Known Problems Maternal Uncle     No Known Problems Paternal Uncle     No Known Problems Paternal Grandmother     No Known Problems Paternal Grandfather     Breast cancer Maternal Aunt  "    Breast cancer Maternal Aunt     Breast cancer Maternal Aunt     Breast cancer Maternal Aunt     Amblyopia Neg Hx     Blindness Neg Hx     Cancer Neg Hx     Cataracts Neg Hx     Macular degeneration Neg Hx     Retinal detachment Neg Hx     Strabismus Neg Hx     Stroke Neg Hx     Thyroid disease Neg Hx      Social History     Tobacco Use    Smoking status: Never Smoker    Smokeless tobacco: Never Used   Substance Use Topics    Alcohol use: Yes    Drug use: No     Review of Systems   Constitutional: Negative for activity change, appetite change, chills, fatigue and fever.   HENT: Negative for congestion and rhinorrhea.    Eyes: Negative for visual disturbance.   Respiratory: Negative for shortness of breath.    Cardiovascular: Negative for chest pain and palpitations.   Gastrointestinal: Positive for abdominal pain. Negative for diarrhea, nausea and vomiting.   Genitourinary: Positive for vaginal bleeding. Negative for dysuria and pelvic pain.   Musculoskeletal: Positive for back pain.   Skin: Negative for rash.   Neurological: Negative for dizziness, light-headedness and headaches.   Psychiatric/Behavioral: Negative for agitation. The patient is nervous/anxious.        Physical Exam     Initial Vitals   BP Pulse Resp Temp SpO2   04/11/19 1953 04/11/19 1950 04/11/19 1950 04/11/19 1950 04/11/19 1950   (!) 139/91 79 20 98.4 °F (36.9 °C) 100 %      MAP       --                Physical Exam    Vitals reviewed.  Constitutional: She appears well-developed and well-nourished. No distress.   HENT:   Head: Normocephalic and atraumatic.   Cardiovascular: Normal rate, regular rhythm and normal heart sounds.   Pulmonary/Chest: No respiratory distress.   Abdominal: Soft. There is no tenderness. There is no rebound and no guarding.   Musculoskeletal: She exhibits no edema or tenderness.   Neurological: She is alert and oriented to person, place, and time.   Skin: Skin is warm and dry.   Psychiatric: She has a  normal mood and affect. Her behavior is normal. Judgment and thought content normal.     OB LABOR EXAM:   Pre-Term Labor: Yes.   Membranes ruptured: Yes.   Method: Sterile speculum exam per MD.   Vaginal Bleeding: bright red.               Comments: SSE: Moderate amount of dark red blood and clot in the vaginal vault. Active bleeding noted at os with membranes visible.   SVE: 2.5/80/-2. SROM during check.       ED Course   Procedures  Labs Reviewed   CBC W/ AUTO DIFFERENTIAL   TYPE AND SCREEN LABOR & DELIVERY          Imaging Results    None          Medical Decision Making:   ED Management:  - VSS  - FHTs +  - SSE: active vaginal bleeding with membranes trailing out of cervix  - Patient changed from 2.5 to 4cm in HAY[place]IE with SROM, jacqueline painfully every 3 minutes  - Admitted to L&D for management of  labor  - Consents reviewed and signed, including D&C consent                   ED Course as of 400   Thu  Previable ROM, trailing membranes out of cervix, 2-3 cm dilated, breech. Counseled on expectant vs active management of previable ROM. Pt and significant other considering options.    [LB]      ED Course User Index  [LB] Myrna Lange MD     Clinical Impression:       ICD-10-CM ICD-9-CM   1.  delivery O60.10X0 644.21   2. 22 weeks gestation of pregnancy Z3A.22 V22.2                                Rossi Garsia MD  Resident  19

## 2019-04-13 DIAGNOSIS — E03.8 OTHER SPECIFIED HYPOTHYROIDISM: Primary | ICD-10-CM

## 2019-04-13 RX ORDER — METOCLOPRAMIDE 5 MG/1
5 TABLET ORAL 3 TIMES DAILY
Qty: 90 TABLET | Refills: 1 | Status: SHIPPED | OUTPATIENT
Start: 2019-04-13 | End: 2019-05-20

## 2019-04-13 RX ORDER — LEVOTHYROXINE SODIUM 25 UG/1
25 TABLET ORAL DAILY
Qty: 30 TABLET | Refills: 3 | Status: SHIPPED | OUTPATIENT
Start: 2019-04-13 | End: 2022-02-01

## 2019-04-15 ENCOUNTER — TELEPHONE (OUTPATIENT)
Dept: OBSTETRICS AND GYNECOLOGY | Facility: CLINIC | Age: 39
End: 2019-04-15

## 2019-04-15 NOTE — TELEPHONE ENCOUNTER
----- Message from Radha Dumont MD sent at 4/12/2019  6:10 PM CDT -----  Please add patient to my schedule for postpartum visit on April 17 at 9am. Please block her for one hour.    I will see the next patient at 10am as scheduled.    Dr dumont

## 2019-04-17 ENCOUNTER — LAB VISIT (OUTPATIENT)
Dept: LAB | Facility: HOSPITAL | Age: 39
End: 2019-04-17
Attending: OBSTETRICS & GYNECOLOGY
Payer: COMMERCIAL

## 2019-04-17 ENCOUNTER — POSTPARTUM VISIT (OUTPATIENT)
Dept: OBSTETRICS AND GYNECOLOGY | Facility: CLINIC | Age: 39
End: 2019-04-17
Payer: COMMERCIAL

## 2019-04-17 ENCOUNTER — TELEPHONE (OUTPATIENT)
Dept: OBSTETRICS AND GYNECOLOGY | Facility: CLINIC | Age: 39
End: 2019-04-17

## 2019-04-17 VITALS
BODY MASS INDEX: 50.15 KG/M2 | HEIGHT: 63 IN | DIASTOLIC BLOOD PRESSURE: 90 MMHG | SYSTOLIC BLOOD PRESSURE: 120 MMHG | WEIGHT: 283.06 LBS

## 2019-04-17 DIAGNOSIS — R52 POSTPARTUM PAIN: ICD-10-CM

## 2019-04-17 DIAGNOSIS — N91.1 SECONDARY AMENORRHEA: Primary | ICD-10-CM

## 2019-04-17 DIAGNOSIS — O03.9 SAB (SPONTANEOUS ABORTION): ICD-10-CM

## 2019-04-17 DIAGNOSIS — F41.9 ANXIETY: ICD-10-CM

## 2019-04-17 LAB
BASOPHILS # BLD AUTO: 0.03 K/UL (ref 0–0.2)
BASOPHILS NFR BLD: 0.4 % (ref 0–1.9)
DIFFERENTIAL METHOD: ABNORMAL
EOSINOPHIL # BLD AUTO: 0.2 K/UL (ref 0–0.5)
EOSINOPHIL NFR BLD: 3.1 % (ref 0–8)
ERYTHROCYTE [DISTWIDTH] IN BLOOD BY AUTOMATED COUNT: 16.4 % (ref 11.5–14.5)
HCG INTACT+B SERPL-ACNC: 79 MIU/ML
HCT VFR BLD AUTO: 32.2 % (ref 37–48.5)
HGB BLD-MCNC: 9.9 G/DL (ref 12–16)
LYMPHOCYTES # BLD AUTO: 1.5 K/UL (ref 1–4.8)
LYMPHOCYTES NFR BLD: 19.8 % (ref 18–48)
MCH RBC QN AUTO: 24.6 PG (ref 27–31)
MCHC RBC AUTO-ENTMCNC: 30.7 G/DL (ref 32–36)
MCV RBC AUTO: 80 FL (ref 82–98)
MONOCYTES # BLD AUTO: 0.4 K/UL (ref 0.3–1)
MONOCYTES NFR BLD: 5.4 % (ref 4–15)
NEUTROPHILS # BLD AUTO: 5.3 K/UL (ref 1.8–7.7)
NEUTROPHILS NFR BLD: 71 % (ref 38–73)
PLATELET # BLD AUTO: 262 K/UL (ref 150–350)
PMV BLD AUTO: 10.9 FL (ref 9.2–12.9)
RBC # BLD AUTO: 4.03 M/UL (ref 4–5.4)
WBC # BLD AUTO: 7.39 K/UL (ref 3.9–12.7)

## 2019-04-17 PROCEDURE — 0503F POSTPARTUM CARE VISIT: CPT | Mod: S$GLB,,, | Performed by: OBSTETRICS & GYNECOLOGY

## 2019-04-17 PROCEDURE — 84702 CHORIONIC GONADOTROPIN TEST: CPT

## 2019-04-17 PROCEDURE — 99999 PR PBB SHADOW E&M-EST. PATIENT-LVL II: CPT | Mod: PBBFAC,,, | Performed by: OBSTETRICS & GYNECOLOGY

## 2019-04-17 PROCEDURE — 36415 COLL VENOUS BLD VENIPUNCTURE: CPT

## 2019-04-17 PROCEDURE — 0503F PR POSTPARTUM CARE VISIT: ICD-10-PCS | Mod: S$GLB,,, | Performed by: OBSTETRICS & GYNECOLOGY

## 2019-04-17 PROCEDURE — 99999 PR PBB SHADOW E&M-EST. PATIENT-LVL II: ICD-10-PCS | Mod: PBBFAC,,, | Performed by: OBSTETRICS & GYNECOLOGY

## 2019-04-17 PROCEDURE — 85025 COMPLETE CBC W/AUTO DIFF WBC: CPT

## 2019-04-17 RX ORDER — OXYCODONE AND ACETAMINOPHEN 5; 325 MG/1; MG/1
1 TABLET ORAL EVERY 4 HOURS PRN
Qty: 12 TABLET | Refills: 0 | Status: SHIPPED | OUTPATIENT
Start: 2019-04-17 | End: 2019-05-20

## 2019-04-17 RX ORDER — LORAZEPAM 1 MG/1
1 TABLET ORAL EVERY 6 HOURS PRN
Qty: 20 TABLET | Refills: 0 | Status: SHIPPED | OUTPATIENT
Start: 2019-04-17 | End: 2019-05-20

## 2019-04-17 NOTE — TELEPHONE ENCOUNTER
4/17/2019 @ 1358 Savanna Legert diagnosis code given for percocet (O90.89 post partum pain) Spoke with pharmacist, Okay for ativan as prescribed.     Dr. Dumont spoke with pharmacist and informed her the patient stated during her visit today, she will more than likely not take the ativan but would like it to be at the pharmacy in case she needs it.     Dr. Dumont asked the pharmacist to speak with the patient and educate her of the interactions and the concerns and have her get the percocet now and the ativan at a later day if needed. The pharmacist agreed.

## 2019-04-17 NOTE — PROGRESS NOTES
"39 yo now  female who presents for postpartum evaluation.  Patient is IVF patient.  Now s/p SAB at 22 weeks on 19.    Patient and partner are still quite upset about losing baby.  Patient not sleeping well and having lots of crying spells. Feeling anxiety. Replaying events in their heads.  Will be meeting with therapist next week.     Will have baby boy fetus (Dougie) cremated in the next few days.    Patient reports that she is bleeding like a period. Pain worse at nighttime. Motrin not helping the pain.     PE  BP (!) 120/90   Ht 5' 3" (1.6 m)   Wt 128.4 kg (283 lb 1.1 oz)   LMP 2018 (Exact Date)   BMI 50.14 kg/m²   Vaginal: blood in the vaginal vault but no excessive bleeding appreciated. Cervix appears normal with no masses.    AP: postpartum care s/p  delivery with fetal demise at 22.1 weeks  -patient has spoken to IVF clinic: recommend serum bhcg weekly until zero  -patient counseled on recommendation for preconception counseling with MFM (likely Dr. Villanueva) prior to next IVF attempt  -will likely need prophylactic cervical cerclage    Serum bhcg and CBC ordered  rx for ativan prn anxiety sent  rx for percocet sent prn postpartum pain    babak nolen MD                "

## 2019-04-17 NOTE — TELEPHONE ENCOUNTER
----- Message from Prema Copeland sent at 4/17/2019 11:52 AM CDT -----  Contact: radha lauren [facility] [phone]  Pharmacist advised they need a diagnosis code for the oxyCODONE-acetaminophen (PERCOCET) 5-325 mg per tablet. Please call.

## 2019-04-18 ENCOUNTER — TELEPHONE (OUTPATIENT)
Dept: OBSTETRICS AND GYNECOLOGY | Facility: CLINIC | Age: 39
End: 2019-04-18

## 2019-04-18 ENCOUNTER — TELEPHONE (OUTPATIENT)
Dept: MATERNAL FETAL MEDICINE | Facility: CLINIC | Age: 39
End: 2019-04-18

## 2019-04-18 DIAGNOSIS — O34.32 PREMATURE CERVICAL DILATION IN SECOND TRIMESTER: Primary | ICD-10-CM

## 2019-04-18 DIAGNOSIS — N76.0 ACUTE VAGINITIS: Primary | ICD-10-CM

## 2019-04-18 NOTE — TELEPHONE ENCOUNTER
----- Message from Radha Dumont MD sent at 4/17/2019  7:04 PM CDT -----  Inform patient that blood pregnancy level is 79.   I would suggest repeating the blood test in 2 weeks (not one).    Order placed in the computer.     Dr dumont

## 2019-04-18 NOTE — TELEPHONE ENCOUNTER
"Return call to patient regarding MFM consult with Dr. Villanueva and discussed with patient that Dr. Villanueva would like to see her in 2 months. Reviewed with the patient that our clinic schedule is not currently open for booking in June but that an order was placed and the patient will receive a call from our department to schedule as soon as the appointment can be booked. Patient states that she does have a "good" support system at home and plans to attend [employer] this weekend. Patient will wait to hear back from MFM clinic and Pt verbalized understanding of information.        "

## 2019-04-22 NOTE — ANESTHESIA PREPROCEDURE EVALUATION
2019  Zaida Chung is a 38 y.o., female with IVU IUP at 22 weeks 1 day here in  labor.  Baby unfortunately not of viable gestational age and resuscitation not to be done.      Past Medical History:   Diagnosis Date    Nuclear cataract 2017       Past Surgical History:   Procedure Laterality Date    ABDOMINAL SURGERY      BREAST BIOPSY      CHOLECYSTECTOMY      CORNEAL TRANSPLANT Left 16    Dr Sharp    CORNEAL TRANSPLANT Right 2017    Dr. Sharp    DILATION AND CURETTAGE OF UTERUS      gallbaldder N/A 2015    HERNIA REPAIR      HIATAL HERNIA REPAIR      STOMACH SURGERY  Gastric sleeve    TRANSPLANT-CORNEA Right 2017    [facility] [place]    TRANSPLANT-CORNEA/PKP Left 2016    [facility]    TRANSPLANT-DSAEK CORNEAL Left 5/10/2018    [facility] [place]         Anesthesia Evaluation    I have reviewed the Patient Summary Reports.     I have reviewed the Medications.     Review of Systems  Anesthesia Hx:  No problems with previous Anesthesia  History of prior surgery of interest to airway management or planning: Denies Family Hx of Anesthesia complications.   Denies Personal Hx of Anesthesia complications.   Cardiovascular:  Cardiovascular Normal     Pulmonary:  Pulmonary Normal    Hepatic/GI:  Hepatic/GI Normal    Neurological:  Neurology Normal        Physical Exam  General:  Well nourished, Morbid Obesity    Airway/Jaw/Neck:  Airway Findings: Mouth Opening: Normal Tongue: Normal  General Airway Assessment: Adult  Mallampati: III  TM Distance: Normal, at least 6 cm      Dental:  Dental Findings: In tact   Chest/Lungs:  Chest/Lungs Findings: Normal Respiratory Rate     Heart/Vascular:  Heart Findings: Rate: Normal        Mental Status:  Mental Status Findings:  Alert and  Oriented         Anesthesia Plan  Type of Anesthesia, risks & benefits discussed:  Anesthesia Type:  epidural  Patient's Preference:   Intra-op Monitoring Plan: standard ASA monitors  Intra-op Monitoring Plan Comments:   Post Op Pain Control Plan: multimodal analgesia, IV/PO Opioids PRN and per primary service following discharge from PACU  Post Op Pain Control Plan Comments:   Induction:   IV  Beta Blocker:  Patient is not currently on a Beta-Blocker (No further documentation required).       Informed Consent: Patient understands risks and agrees with Anesthesia plan.  Questions answered. Anesthesia consent signed with patient.  ASA Score: 3     Day of Surgery Review of History & Physical:    H&P update referred to the surgeon.         Ready For Surgery From Anesthesia Perspective.

## 2019-04-23 NOTE — TELEPHONE ENCOUNTER
Called patient back to schedule 4 week pp office visit. Scheduled for May 20, 2019. Patient states she started experiencing a burning and icthing at rest in her vaginal area which started this weekend and has not tried anything for treatment. Please advise

## 2019-04-24 ENCOUNTER — OFFICE VISIT (OUTPATIENT)
Dept: OPHTHALMOLOGY | Facility: CLINIC | Age: 39
End: 2019-04-24
Payer: COMMERCIAL

## 2019-04-24 DIAGNOSIS — H18.603 KERATOCONUS, BILATERAL: Primary | ICD-10-CM

## 2019-04-24 DIAGNOSIS — Z94.7 STATUS POST CORNEAL TRANSPLANT: ICD-10-CM

## 2019-04-24 DIAGNOSIS — T86.8409 CORNEAL GRAFT REJECTION: ICD-10-CM

## 2019-04-24 PROCEDURE — 99999 PR PBB SHADOW E&M-EST. PATIENT-LVL II: CPT | Mod: PBBFAC,,, | Performed by: OPHTHALMOLOGY

## 2019-04-24 PROCEDURE — 92014 COMPRE OPH EXAM EST PT 1/>: CPT | Mod: S$GLB,,, | Performed by: OPHTHALMOLOGY

## 2019-04-24 PROCEDURE — 99999 PR PBB SHADOW E&M-EST. PATIENT-LVL II: ICD-10-PCS | Mod: PBBFAC,,, | Performed by: OPHTHALMOLOGY

## 2019-04-24 PROCEDURE — 92014 PR EYE EXAM, EST PATIENT,COMPREHESV: ICD-10-PCS | Mod: S$GLB,,, | Performed by: OPHTHALMOLOGY

## 2019-04-24 RX ORDER — FLUCONAZOLE 150 MG/1
150 TABLET ORAL
Qty: 2 TABLET | Refills: 0 | Status: SHIPPED | OUTPATIENT
Start: 2019-04-24 | End: 2019-05-20

## 2019-04-24 RX ORDER — FLUOROMETHOLONE 1 MG/ML
1 SUSPENSION/ DROPS OPHTHALMIC 3 TIMES DAILY
Qty: 15 ML | Refills: 3 | Status: SHIPPED | OUTPATIENT
Start: 2019-04-24 | End: 2019-05-04

## 2019-04-24 NOTE — PROGRESS NOTES
HPI     S/p DSEK OS (5/10/18)    Pt states that for the past two months or so have been itchy and blurry.   Pt states that she is using FML OU.       Last edited by Jocelyn Petersen on 4/24/2019  3:41 PM. (History)            Assessment /Plan     For exam results, see Encounter Report.    Keratoconus, bilateral    Status post corneal transplant    Corneal graft rejection      PKP OD excellent result, suture loose, so plan removal soon (in procedure room)    PKP/DSEK OS, still some pigment on DSEK but clear and stable, FML tid

## 2019-04-26 ENCOUNTER — TELEPHONE (OUTPATIENT)
Dept: PHARMACY | Facility: CLINIC | Age: 39
End: 2019-04-26

## 2019-05-01 NOTE — TELEPHONE ENCOUNTER
Confirmed two patient identifiers with patient - name + , with spouse on the line as well. Patient informed that Endometrin Rx is NOT covered through insurance - patient does NOT have fertility benefits. She verbalized understanding and asked [facility] to place RX on hold. She will call [facility] if further assistance is needed. She may want it transferred to [facility], but asked [facility] NOT to take action at this time. TTN

## 2019-05-02 ENCOUNTER — LAB VISIT (OUTPATIENT)
Dept: LAB | Facility: HOSPITAL | Age: 39
End: 2019-05-02
Attending: OBSTETRICS & GYNECOLOGY
Payer: COMMERCIAL

## 2019-05-02 DIAGNOSIS — N91.1 SECONDARY AMENORRHEA: ICD-10-CM

## 2019-05-02 LAB — HCG INTACT+B SERPL-ACNC: 3.3 MIU/ML

## 2019-05-02 PROCEDURE — 36415 COLL VENOUS BLD VENIPUNCTURE: CPT

## 2019-05-02 PROCEDURE — 84702 CHORIONIC GONADOTROPIN TEST: CPT

## 2019-05-06 DIAGNOSIS — E28.9 OVARIAN DYSFUNCTION: Primary | ICD-10-CM

## 2019-05-08 ENCOUNTER — HOSPITAL ENCOUNTER (OUTPATIENT)
Dept: RADIOLOGY | Facility: OTHER | Age: 39
Discharge: HOME | End: 2019-05-08
Attending: OBSTETRICS & GYNECOLOGY
Payer: COMMERCIAL

## 2019-05-08 DIAGNOSIS — E28.9 OVARIAN DYSFUNCTION: ICD-10-CM

## 2019-05-08 PROCEDURE — 76856 US PELVIS COMP WITH TRANSVAG NON-OB (XPD): ICD-10-PCS | Mod: 26,,, | Performed by: INTERNAL MEDICINE

## 2019-05-08 PROCEDURE — 76830 US PELVIS COMP WITH TRANSVAG NON-OB (XPD): ICD-10-PCS | Mod: 26,,, | Performed by: INTERNAL MEDICINE

## 2019-05-08 PROCEDURE — 76830 TRANSVAGINAL US NON-OB: CPT | Mod: 26,,, | Performed by: INTERNAL MEDICINE

## 2019-05-08 PROCEDURE — 76856 US EXAM PELVIC COMPLETE: CPT | Mod: 26,,, | Performed by: INTERNAL MEDICINE

## 2019-05-08 PROCEDURE — 76830 TRANSVAGINAL US NON-OB: CPT | Mod: TC

## 2019-05-14 DIAGNOSIS — E28.9 DYSFUNCTION OVARIAN: Primary | ICD-10-CM

## 2019-05-15 ENCOUNTER — TELEPHONE (OUTPATIENT)
Dept: OBSTETRICS AND GYNECOLOGY | Facility: CLINIC | Age: 39
End: 2019-05-15

## 2019-05-15 ENCOUNTER — TELEPHONE (OUTPATIENT)
Dept: MATERNAL FETAL MEDICINE | Facility: CLINIC | Age: 39
End: 2019-05-15

## 2019-05-15 ENCOUNTER — HOSPITAL ENCOUNTER (OUTPATIENT)
Dept: RADIOLOGY | Facility: OTHER | Age: 39
Discharge: HOME | End: 2019-05-15
Attending: OBSTETRICS & GYNECOLOGY
Payer: COMMERCIAL

## 2019-05-15 DIAGNOSIS — E28.9 DYSFUNCTION OVARIAN: ICD-10-CM

## 2019-05-15 PROCEDURE — 76856 US EXAM PELVIC COMPLETE: CPT | Mod: 26,,, | Performed by: INTERNAL MEDICINE

## 2019-05-15 PROCEDURE — 76830 US PELVIS COMP WITH TRANSVAG NON-OB (XPD): ICD-10-PCS | Mod: 26,,, | Performed by: INTERNAL MEDICINE

## 2019-05-15 PROCEDURE — 76856 US PELVIS COMP WITH TRANSVAG NON-OB (XPD): ICD-10-PCS | Mod: 26,,, | Performed by: INTERNAL MEDICINE

## 2019-05-15 PROCEDURE — 76830 TRANSVAGINAL US NON-OB: CPT | Mod: 26,,, | Performed by: INTERNAL MEDICINE

## 2019-05-15 PROCEDURE — 76830 TRANSVAGINAL US NON-OB: CPT | Mod: TC

## 2019-05-15 NOTE — TELEPHONE ENCOUNTER
----- Message from Sandra Dennis sent at 2019  4:54 PM CDT -----  Contact: pt  Name of Who is Calling: YOJANA MCGUIRE [[id number]]    What is the request in detail:Patient is requesting documentation in regards to  son  Please contact to further discuss and advise      Can the clinic reply by [facility]: yes     [phone]

## 2019-05-15 NOTE — TELEPHONE ENCOUNTER
Called pt and informed her she would have to contact vital records. Pt was given tel ph number to call them. PT verbalized understanding.

## 2019-05-15 NOTE — TELEPHONE ENCOUNTER
Nurse phoned patient to get her scheduled for her pre-pregnancy with Dr. Villanueva or Dr. Shaw for her prior pregnancy.     When nurse phoned patient accordingly, patient stated she would like to discuss with her OB provider first before booking the appointment.    Nurse verbalized understanding and let patient know she would update her order notes as possible.

## 2019-05-20 ENCOUNTER — OFFICE VISIT (OUTPATIENT)
Dept: OBSTETRICS AND GYNECOLOGY | Facility: CLINIC | Age: 39
End: 2019-05-20
Payer: COMMERCIAL

## 2019-05-20 VITALS — SYSTOLIC BLOOD PRESSURE: 140 MMHG | DIASTOLIC BLOOD PRESSURE: 90 MMHG | BODY MASS INDEX: 49.87 KG/M2 | WEIGHT: 281.5 LBS

## 2019-05-20 PROCEDURE — 99999 PR PBB SHADOW E&M-EST. PATIENT-LVL III: CPT | Mod: PBBFAC,,, | Performed by: OBSTETRICS & GYNECOLOGY

## 2019-05-20 PROCEDURE — 0503F PR POSTPARTUM CARE VISIT: ICD-10-PCS | Mod: S$GLB,,, | Performed by: OBSTETRICS & GYNECOLOGY

## 2019-05-20 PROCEDURE — 0503F POSTPARTUM CARE VISIT: CPT | Mod: S$GLB,,, | Performed by: OBSTETRICS & GYNECOLOGY

## 2019-05-20 PROCEDURE — 99999 PR PBB SHADOW E&M-EST. PATIENT-LVL III: ICD-10-PCS | Mod: PBBFAC,,, | Performed by: OBSTETRICS & GYNECOLOGY

## 2019-05-20 NOTE — PROGRESS NOTES
37 yo now  female who presents for postpartum evaluation.  Patient is IVF patient.  Now s/p SAB at 22 weeks on 19.    Patient and partner are still quite upset about losing baby.   Patient reports that she and her partner still feel very sad about outcome of pregnancy.     Patient has reconnected with her BABAK MD in [place] - regarding proceeding with another IVF transfer.    I have encouraged the patient to see MFM prior to proceeding with IVF transfer.  Patient is considering having MFM consult outside of [facility] facility.   Will likely need prophylactic cervical cerclage. May need meds for BP as today vitals are:   BP (!) 140/90   Wt 127.7 kg (281 lb 8.4 oz)   BMI 49.87 kg/m²     All questions for patient answered today.    Will f/u as needed.    babak nolen MD

## 2019-05-21 DIAGNOSIS — E28.9 OVARIAN DYSFUNCTION: Primary | ICD-10-CM

## 2019-05-22 ENCOUNTER — HOSPITAL ENCOUNTER (OUTPATIENT)
Dept: RADIOLOGY | Facility: OTHER | Age: 39
Discharge: HOME | End: 2019-05-22
Attending: OBSTETRICS & GYNECOLOGY
Payer: COMMERCIAL

## 2019-05-22 DIAGNOSIS — E28.9 OVARIAN DYSFUNCTION: ICD-10-CM

## 2019-05-22 PROCEDURE — 76830 TRANSVAGINAL US NON-OB: CPT | Mod: TC

## 2019-05-22 PROCEDURE — 76856 US EXAM PELVIC COMPLETE: CPT | Mod: 26,,, | Performed by: RADIOLOGY

## 2019-05-22 PROCEDURE — 76830 US PELVIS COMP WITH TRANSVAG NON-OB (XPD): ICD-10-PCS | Mod: 26,,, | Performed by: RADIOLOGY

## 2019-05-22 PROCEDURE — 76830 TRANSVAGINAL US NON-OB: CPT | Mod: 26,,, | Performed by: RADIOLOGY

## 2019-05-22 PROCEDURE — 76856 US PELVIS COMP WITH TRANSVAG NON-OB (XPD): ICD-10-PCS | Mod: 26,,, | Performed by: RADIOLOGY

## 2019-05-23 DIAGNOSIS — E28.9 OVARIAN DYSFUNCTION: Primary | ICD-10-CM

## 2019-05-30 ENCOUNTER — HOSPITAL ENCOUNTER (OUTPATIENT)
Dept: RADIOLOGY | Facility: OTHER | Age: 39
Discharge: HOME | End: 2019-05-30
Attending: OPHTHALMOLOGY
Payer: COMMERCIAL

## 2019-05-30 DIAGNOSIS — E28.9 OVARIAN DYSFUNCTION: ICD-10-CM

## 2019-05-30 PROCEDURE — 76830 TRANSVAGINAL US NON-OB: CPT | Mod: 26,,, | Performed by: RADIOLOGY

## 2019-05-30 PROCEDURE — 76830 US PELVIS COMP WITH TRANSVAG NON-OB (XPD): ICD-10-PCS | Mod: 26,,, | Performed by: RADIOLOGY

## 2019-05-30 PROCEDURE — 76830 TRANSVAGINAL US NON-OB: CPT | Mod: TC

## 2019-05-30 PROCEDURE — 76856 US EXAM PELVIC COMPLETE: CPT | Mod: 26,,, | Performed by: RADIOLOGY

## 2019-05-30 PROCEDURE — 76856 US PELVIS COMP WITH TRANSVAG NON-OB (XPD): ICD-10-PCS | Mod: 26,,, | Performed by: RADIOLOGY

## 2019-06-20 DIAGNOSIS — E28.9 OVARIAN DYSFUNCTION: Primary | ICD-10-CM

## 2019-06-21 ENCOUNTER — HOSPITAL ENCOUNTER (OUTPATIENT)
Dept: RADIOLOGY | Facility: OTHER | Age: 39
Discharge: HOME | End: 2019-06-21
Attending: OBSTETRICS & GYNECOLOGY
Payer: COMMERCIAL

## 2019-06-21 DIAGNOSIS — E28.9 OVARIAN DYSFUNCTION: ICD-10-CM

## 2019-06-21 PROCEDURE — 76856 US EXAM PELVIC COMPLETE: CPT | Mod: 26,,, | Performed by: RADIOLOGY

## 2019-06-21 PROCEDURE — 76856 US PELVIS COMP WITH TRANSVAG NON-OB (XPD): ICD-10-PCS | Mod: 26,,, | Performed by: RADIOLOGY

## 2019-06-21 PROCEDURE — 76830 TRANSVAGINAL US NON-OB: CPT | Mod: 26,,, | Performed by: RADIOLOGY

## 2019-06-21 PROCEDURE — 76830 TRANSVAGINAL US NON-OB: CPT | Mod: TC

## 2019-06-21 PROCEDURE — 76830 US PELVIS COMP WITH TRANSVAG NON-OB (XPD): ICD-10-PCS | Mod: 26,,, | Performed by: RADIOLOGY

## 2019-06-25 DIAGNOSIS — E28.9 OVARIAN DYSFUNCTION: Primary | ICD-10-CM

## 2019-07-01 ENCOUNTER — HOSPITAL ENCOUNTER (OUTPATIENT)
Dept: RADIOLOGY | Facility: OTHER | Age: 39
Discharge: HOME | End: 2019-07-01
Attending: OBSTETRICS & GYNECOLOGY
Payer: COMMERCIAL

## 2019-07-01 DIAGNOSIS — E28.9 OVARIAN DYSFUNCTION: ICD-10-CM

## 2019-07-01 PROCEDURE — 76856 US PELVIS COMP WITH TRANSVAG NON-OB (XPD): ICD-10-PCS | Mod: 26,,, | Performed by: RADIOLOGY

## 2019-07-01 PROCEDURE — 76830 US PELVIS COMP WITH TRANSVAG NON-OB (XPD): ICD-10-PCS | Mod: 26,,, | Performed by: RADIOLOGY

## 2019-07-01 PROCEDURE — 76830 TRANSVAGINAL US NON-OB: CPT | Mod: TC

## 2019-07-01 PROCEDURE — 76856 US EXAM PELVIC COMPLETE: CPT | Mod: 26,,, | Performed by: RADIOLOGY

## 2019-07-01 PROCEDURE — 76830 TRANSVAGINAL US NON-OB: CPT | Mod: 26,,, | Performed by: RADIOLOGY

## 2019-07-19 ENCOUNTER — PROCEDURE VISIT (OUTPATIENT)
Dept: OPHTHALMOLOGY | Facility: CLINIC | Age: 39
End: 2019-07-19
Payer: COMMERCIAL

## 2019-07-19 DIAGNOSIS — H18.603 KERATOCONUS, BILATERAL: ICD-10-CM

## 2019-07-19 DIAGNOSIS — T86.8409 CORNEAL GRAFT REJECTION: Primary | ICD-10-CM

## 2019-07-19 DIAGNOSIS — H40.043 STEROID RESPONDERS, BILATERAL: ICD-10-CM

## 2019-07-19 PROCEDURE — 92014 PR EYE EXAM, EST PATIENT,COMPREHESV: ICD-10-PCS | Mod: S$GLB,,, | Performed by: OPHTHALMOLOGY

## 2019-07-19 PROCEDURE — 92014 COMPRE OPH EXAM EST PT 1/>: CPT | Mod: S$GLB,,, | Performed by: OPHTHALMOLOGY

## 2019-07-19 RX ORDER — DIFLUPREDNATE OPHTHALMIC 0.5 MG/ML
1 EMULSION OPHTHALMIC 4 TIMES DAILY
Qty: 3 ML | Refills: 3 | Status: SHIPPED | OUTPATIENT
Start: 2019-07-19 | End: 2020-12-28

## 2019-07-19 NOTE — PROGRESS NOTES
HPI     S/p DSEK OS (5/10/18)    Pt states that for the past two months or so have been itchy and blurry.   Pt states that she is using FML OU.       Last edited by Tri Santillan MA on 7/19/2019 11:39 AM. (History)            Assessment /Plan     For exam results, see Encounter Report.    Corneal graft rejection    Steroid responders, bilateral    Keratoconus, bilateral      Steroid responder, but acute rejection OS, so Durezol qid OS, IOP 21 currently  FML OD, IOP 23 borderline    Plan removal of suture OD

## 2019-07-23 DIAGNOSIS — O09.00 PREGNANCY WITH HISTORY OF INFERTILITY: Primary | ICD-10-CM

## 2019-07-29 ENCOUNTER — HOSPITAL ENCOUNTER (OUTPATIENT)
Dept: RADIOLOGY | Facility: OTHER | Age: 39
Discharge: HOME | End: 2019-07-29
Attending: OBSTETRICS & GYNECOLOGY
Payer: COMMERCIAL

## 2019-07-29 DIAGNOSIS — O09.00 PREGNANCY WITH HISTORY OF INFERTILITY: ICD-10-CM

## 2019-07-29 DIAGNOSIS — O09.00 PREGNANCY WITH HISTORY OF INFERTILITY: Primary | ICD-10-CM

## 2019-07-29 PROCEDURE — 76801 OB US < 14 WKS SINGLE FETUS: CPT | Mod: 26,,, | Performed by: RADIOLOGY

## 2019-07-29 PROCEDURE — 76801 US OB LESS THAN 14 WKS WITH TRANSVAGINAL (XPD): ICD-10-PCS | Mod: 26,,, | Performed by: RADIOLOGY

## 2019-07-29 PROCEDURE — 76817 US OB LESS THAN 14 WKS WITH TRANSVAGINAL (XPD): ICD-10-PCS | Mod: 26,,, | Performed by: RADIOLOGY

## 2019-07-29 PROCEDURE — 76801 OB US < 14 WKS SINGLE FETUS: CPT | Mod: TC

## 2019-07-29 PROCEDURE — 76817 TRANSVAGINAL US OBSTETRIC: CPT | Mod: 26,,, | Performed by: RADIOLOGY

## 2019-08-02 ENCOUNTER — LAB VISIT (OUTPATIENT)
Dept: LAB | Facility: OTHER | Age: 39
End: 2019-08-02
Attending: OBSTETRICS & GYNECOLOGY
Payer: COMMERCIAL

## 2019-08-02 DIAGNOSIS — E28.9 DISORDER OF ENDOCRINE OVARY: ICD-10-CM

## 2019-08-02 DIAGNOSIS — E28.9 DISORDER OF ENDOCRINE OVARY: Primary | ICD-10-CM

## 2019-08-02 LAB
HCG INTACT+B SERPL-ACNC: 7812 MIU/ML
PROGEST SERPL-MCNC: 46.3 NG/ML

## 2019-08-02 PROCEDURE — 84702 CHORIONIC GONADOTROPIN TEST: CPT

## 2019-08-02 PROCEDURE — 36415 COLL VENOUS BLD VENIPUNCTURE: CPT

## 2019-08-02 PROCEDURE — 84144 ASSAY OF PROGESTERONE: CPT

## 2019-08-05 ENCOUNTER — LAB VISIT (OUTPATIENT)
Dept: LAB | Facility: OTHER | Age: 39
End: 2019-08-05
Payer: COMMERCIAL

## 2019-08-05 DIAGNOSIS — O02.1 MISSED ABORTION: ICD-10-CM

## 2019-08-05 DIAGNOSIS — O02.1 MISSED ABORTION: Primary | ICD-10-CM

## 2019-08-05 LAB — HCG INTACT+B SERPL-ACNC: 724 MIU/ML

## 2019-08-05 PROCEDURE — 36415 COLL VENOUS BLD VENIPUNCTURE: CPT

## 2019-08-05 PROCEDURE — 84702 CHORIONIC GONADOTROPIN TEST: CPT

## 2019-08-07 ENCOUNTER — OFFICE VISIT (OUTPATIENT)
Dept: OPHTHALMOLOGY | Facility: CLINIC | Age: 39
End: 2019-08-07
Payer: COMMERCIAL

## 2019-08-07 DIAGNOSIS — H40.043 STEROID RESPONDERS, BILATERAL: ICD-10-CM

## 2019-08-07 DIAGNOSIS — H18.603 KERATOCONUS, BILATERAL: Primary | ICD-10-CM

## 2019-08-07 DIAGNOSIS — T86.8409 CORNEAL GRAFT REJECTION: ICD-10-CM

## 2019-08-07 PROCEDURE — 92014 COMPRE OPH EXAM EST PT 1/>: CPT | Mod: S$GLB,,, | Performed by: OPHTHALMOLOGY

## 2019-08-07 PROCEDURE — 92014 PR EYE EXAM, EST PATIENT,COMPREHESV: ICD-10-PCS | Mod: S$GLB,,, | Performed by: OPHTHALMOLOGY

## 2019-08-07 PROCEDURE — 99999 PR PBB SHADOW E&M-EST. PATIENT-LVL II: CPT | Mod: PBBFAC,,, | Performed by: OPHTHALMOLOGY

## 2019-08-07 PROCEDURE — 99999 PR PBB SHADOW E&M-EST. PATIENT-LVL II: ICD-10-PCS | Mod: PBBFAC,,, | Performed by: OPHTHALMOLOGY

## 2019-08-07 NOTE — PROGRESS NOTES
HPI     S/p removal of suture OD (7/19/19)  S/p DSEK OS (5/10/18)    Pt states that OD is feeling a lot better since last exam. Pt states that   OS was cloudy at last exam but it has gotten better. No pain or discomfort   OU.    FML TID-QID  Pred TID-QID    Last edited by Jocelyn Petersen on 8/7/2019  2:20 PM. (History)            Assessment /Plan     For exam results, see Encounter Report.    Keratoconus, bilateral    Corneal graft rejection    Steroid responders, bilateral      Rejection OS improved on DUrezol, and IOP acceptable for now.  Monitor and may add IOP meds OS if needed.  OS PKP with inferior edema and  Dried KP/K line...

## 2019-08-12 ENCOUNTER — LAB VISIT (OUTPATIENT)
Dept: LAB | Facility: OTHER | Age: 39
End: 2019-08-12
Payer: COMMERCIAL

## 2019-08-12 DIAGNOSIS — O02.1 MISSED ABORTION: Primary | ICD-10-CM

## 2019-08-12 LAB — HCG INTACT+B SERPL-ACNC: 36 MIU/ML

## 2019-08-12 PROCEDURE — 36415 COLL VENOUS BLD VENIPUNCTURE: CPT

## 2019-08-12 PROCEDURE — 84702 CHORIONIC GONADOTROPIN TEST: CPT

## 2019-08-19 ENCOUNTER — LAB VISIT (OUTPATIENT)
Dept: LAB | Facility: OTHER | Age: 39
End: 2019-08-19
Attending: OBSTETRICS & GYNECOLOGY
Payer: COMMERCIAL

## 2019-08-19 DIAGNOSIS — O02.1 MISSED ABORTION: Primary | ICD-10-CM

## 2019-08-19 LAB — HCG INTACT+B SERPL-ACNC: 7.7 MIU/ML

## 2019-08-19 PROCEDURE — 36415 COLL VENOUS BLD VENIPUNCTURE: CPT

## 2019-08-19 PROCEDURE — 84702 CHORIONIC GONADOTROPIN TEST: CPT

## 2019-08-26 ENCOUNTER — PATIENT MESSAGE (OUTPATIENT)
Dept: OBSTETRICS AND GYNECOLOGY | Facility: CLINIC | Age: 39
End: 2019-08-26

## 2019-08-26 ENCOUNTER — LAB VISIT (OUTPATIENT)
Dept: LAB | Facility: OTHER | Age: 39
End: 2019-08-26
Attending: OBSTETRICS & GYNECOLOGY
Payer: COMMERCIAL

## 2019-08-26 DIAGNOSIS — O02.1 MISSED ABORTION: Primary | ICD-10-CM

## 2019-08-26 LAB — HCG INTACT+B SERPL-ACNC: 2.1 MIU/ML

## 2019-08-26 PROCEDURE — 84702 CHORIONIC GONADOTROPIN TEST: CPT

## 2019-08-26 PROCEDURE — 36415 COLL VENOUS BLD VENIPUNCTURE: CPT

## 2019-08-27 ENCOUNTER — PATIENT MESSAGE (OUTPATIENT)
Dept: OBSTETRICS AND GYNECOLOGY | Facility: CLINIC | Age: 39
End: 2019-08-27

## 2019-08-27 NOTE — TELEPHONE ENCOUNTER
Please advise    Anand Dumont,    I was pregnant again this last cycle and miscarried at six weeks and one day on August 2. During an ultrasound, the ultrasound uncovered I have three fibroids. The RE would like for the fibroids removed so I can proceed with another transfer. In addition to eliminating the fibroids, he feels I have endometriosis, and that should be taken care of as well.     Please let me know if this is a possibility to do surgery with you.     Thanks,   Tia

## 2019-08-28 ENCOUNTER — OFFICE VISIT (OUTPATIENT)
Dept: OBSTETRICS AND GYNECOLOGY | Facility: CLINIC | Age: 39
End: 2019-08-28
Payer: COMMERCIAL

## 2019-08-28 VITALS
WEIGHT: 130.13 LBS | HEIGHT: 63 IN | SYSTOLIC BLOOD PRESSURE: 124 MMHG | BODY MASS INDEX: 23.06 KG/M2 | DIASTOLIC BLOOD PRESSURE: 75 MMHG

## 2019-08-28 DIAGNOSIS — D21.9 FIBROIDS: Primary | ICD-10-CM

## 2019-08-28 PROCEDURE — 99999 PR PBB SHADOW E&M-EST. PATIENT-LVL III: CPT | Mod: PBBFAC,,, | Performed by: OBSTETRICS & GYNECOLOGY

## 2019-08-28 PROCEDURE — 99212 PR OFFICE/OUTPT VISIT, EST, LEVL II, 10-19 MIN: ICD-10-PCS | Mod: S$GLB,,, | Performed by: OBSTETRICS & GYNECOLOGY

## 2019-08-28 PROCEDURE — 3008F BODY MASS INDEX DOCD: CPT | Mod: CPTII,S$GLB,, | Performed by: OBSTETRICS & GYNECOLOGY

## 2019-08-28 PROCEDURE — 99212 OFFICE O/P EST SF 10 MIN: CPT | Mod: S$GLB,,, | Performed by: OBSTETRICS & GYNECOLOGY

## 2019-08-28 PROCEDURE — 99999 PR PBB SHADOW E&M-EST. PATIENT-LVL III: ICD-10-PCS | Mod: PBBFAC,,, | Performed by: OBSTETRICS & GYNECOLOGY

## 2019-08-28 PROCEDURE — 3008F PR BODY MASS INDEX (BMI) DOCUMENTED: ICD-10-PCS | Mod: CPTII,S$GLB,, | Performed by: OBSTETRICS & GYNECOLOGY

## 2019-08-28 NOTE — PROGRESS NOTES
37 yo now  female with h/o PCOS who presents to discuss management of uterine fibroids.  Patient has had IVF x 2 cycles with BABAK in [place], [place].  Most recent ultrasounds show 3 uterine fibroids.  BABAK ARTEAGA concerned that fibroids in the lower uterine segment near cervix might be increasing risks for SAB.  BABAK ARTEAGA suggested myomectomy.  I have reviewed the patient's imaging with our sonographer.  The imaging from 2019 is most telling.  She has 2 small fibroids that are not in the uterine cavity that are likely NOT causing problems with maintaining pregnancy.  There is a larger fibroid described near the cervix, however.  Patient wants to know if this is a problem and if it can be removed laparoscopically.  The patient will be referred to Dr. Craft for further evaluation of possible LSC myomectomy - if needed.    Will likely need MRI of pelvis.    babak nolen MD

## 2019-08-30 ENCOUNTER — PATIENT MESSAGE (OUTPATIENT)
Dept: OBSTETRICS AND GYNECOLOGY | Facility: CLINIC | Age: 39
End: 2019-08-30

## 2019-08-30 ENCOUNTER — TELEPHONE (OUTPATIENT)
Dept: PHARMACY | Facility: CLINIC | Age: 39
End: 2019-08-30

## 2019-08-30 DIAGNOSIS — D21.9 FIBROIDS: Primary | ICD-10-CM

## 2019-08-30 NOTE — TELEPHONE ENCOUNTER
[facility] received a prescription for Neupogen from her provider's office.  Patient is aware we are working on authorization and will update her once we have a determination.

## 2019-08-31 DIAGNOSIS — D25.2 INTRAMURAL AND SUBSEROUS LEIOMYOMA OF UTERUS: ICD-10-CM

## 2019-08-31 DIAGNOSIS — D25.1 INTRAMURAL AND SUBSEROUS LEIOMYOMA OF UTERUS: ICD-10-CM

## 2019-09-03 ENCOUNTER — TELEPHONE (OUTPATIENT)
Dept: OBSTETRICS AND GYNECOLOGY | Facility: CLINIC | Age: 39
End: 2019-09-03

## 2019-09-03 ENCOUNTER — LAB VISIT (OUTPATIENT)
Dept: LAB | Facility: HOSPITAL | Age: 39
End: 2019-09-03
Attending: OBSTETRICS & GYNECOLOGY
Payer: COMMERCIAL

## 2019-09-03 DIAGNOSIS — D21.9 FIBROIDS: ICD-10-CM

## 2019-09-03 LAB
ANION GAP SERPL CALC-SCNC: 6 MMOL/L (ref 8–16)
BUN SERPL-MCNC: 11 MG/DL (ref 6–20)
CALCIUM SERPL-MCNC: 8.9 MG/DL (ref 8.7–10.5)
CHLORIDE SERPL-SCNC: 106 MMOL/L (ref 95–110)
CO2 SERPL-SCNC: 28 MMOL/L (ref 23–29)
CREAT SERPL-MCNC: 0.8 MG/DL (ref 0.5–1.4)
EST. GFR  (AFRICAN AMERICAN): >60 ML/MIN/1.73 M^2
EST. GFR  (NON AFRICAN AMERICAN): >60 ML/MIN/1.73 M^2
GLUCOSE SERPL-MCNC: 92 MG/DL (ref 70–110)
POTASSIUM SERPL-SCNC: 3.9 MMOL/L (ref 3.5–5.1)
SODIUM SERPL-SCNC: 140 MMOL/L (ref 136–145)

## 2019-09-03 PROCEDURE — 36415 COLL VENOUS BLD VENIPUNCTURE: CPT

## 2019-09-03 PROCEDURE — 80048 BASIC METABOLIC PNL TOTAL CA: CPT

## 2019-09-03 NOTE — TELEPHONE ENCOUNTER
"----- Message from María Bryson MD sent at 8/31/2019  3:58 PM CDT -----  Speedy Garcia,    I'm sorry I wasn't able to answer when you called the other day - I was in a meeting.      Yes, we can see her.   We can get her set up for an MRI and a consultation.  Thanks!!!    Aydee, please schedule MRI and consult.  ----- Message -----  From: Bonita Dumont MD  Sent: 8/28/2019   9:03 AM  To: María Bryson MD, #    Hi María:    I have a patient who is trying to conceive with IVF.  She has had 2 miscarriages.  Last miscarriage was just recently in July/Aug 2019.    Her most recent U/S showed a possible uterine fibroid "near her cervix" that's about 3cm in diameter.  This can be seen better on the U/S from July 1, 2019.    The BABAK wanted her to be evaluated for possible laparoscopic myomectomy.    I have reviewed the U/S with my sonographer. We both agree that they are small and do not push into the endometrial cavity.     However, the patient wants to be sure that this is not a barrier to pregnancy.    Do you think you can evaluate her to see if she is candidate for LSC myomectomy?    They would like to proceed as soon as possible (if this is a reasonable option) - as they want to try IVF again before the year is over.      The patient wants an opinion from a specialist that does LSC myomectomies.  She also thinks that the fibroid is likely not a problem - BUT - we still want to investigate.    Keep me posted.    IF I order an MRI to help push her along, what order would you want me to put in the chart?    bointa    "

## 2019-09-03 NOTE — TELEPHONE ENCOUNTER
Contacted the patient to schedule fibroids consult. No answer, left voicemail message for the patient to call the clinic back.

## 2019-09-04 ENCOUNTER — HOSPITAL ENCOUNTER (OUTPATIENT)
Dept: RADIOLOGY | Facility: HOSPITAL | Age: 39
Discharge: HOME | End: 2019-09-04
Attending: OBSTETRICS & GYNECOLOGY
Payer: COMMERCIAL

## 2019-09-04 ENCOUNTER — TELEPHONE (OUTPATIENT)
Dept: OBSTETRICS AND GYNECOLOGY | Facility: CLINIC | Age: 39
End: 2019-09-04

## 2019-09-04 DIAGNOSIS — D21.9 FIBROIDS: ICD-10-CM

## 2019-09-04 PROCEDURE — A9585 GADOBUTROL INJECTION: HCPCS | Performed by: OBSTETRICS & GYNECOLOGY

## 2019-09-04 PROCEDURE — 72197 MRI PELVIS W WO CONTRAST: ICD-10-PCS | Mod: 26,,, | Performed by: RADIOLOGY

## 2019-09-04 PROCEDURE — 72197 MRI PELVIS W/O & W/DYE: CPT | Mod: 26,,, | Performed by: RADIOLOGY

## 2019-09-04 PROCEDURE — 72197 MRI PELVIS W/O & W/DYE: CPT | Mod: TC

## 2019-09-04 PROCEDURE — 25500020 PHARM REV CODE 255: Performed by: OBSTETRICS & GYNECOLOGY

## 2019-09-04 RX ORDER — GADOBUTROL 604.72 MG/ML
10 INJECTION INTRAVENOUS
Status: COMPLETED | OUTPATIENT
Start: 2019-09-04 | End: 2019-09-04

## 2019-09-04 RX ADMIN — GADOBUTROL 10 ML: 604.72 INJECTION INTRAVENOUS at 06:09

## 2019-09-04 NOTE — TELEPHONE ENCOUNTER
----- Message from Vishal Jolley sent at 9/4/2019 11:18 AM CDT -----  PT RETURNING YOUR CALL [phone]

## 2019-09-04 NOTE — TELEPHONE ENCOUNTER
Spoke with patient, she returned Aydee phone call to schedule both MRI and appointment with . Patient offered soonest appointment for MRI today at [facility], she was informed I will send Aydee a message to contact her to visit with . Patient voiced understanding, no further questions.

## 2019-09-05 ENCOUNTER — TELEPHONE (OUTPATIENT)
Dept: OBSTETRICS AND GYNECOLOGY | Facility: CLINIC | Age: 39
End: 2019-09-05

## 2019-09-05 NOTE — TELEPHONE ENCOUNTER
Returned call to the patient. Patient agreed to an appointment on 10/14 at 3:45PM. Patient also added to the wait list for a sooner appointment. Patient verbalized understanding.

## 2019-10-14 ENCOUNTER — OFFICE VISIT (OUTPATIENT)
Dept: OBSTETRICS AND GYNECOLOGY | Facility: CLINIC | Age: 39
End: 2019-10-14
Attending: OBSTETRICS & GYNECOLOGY
Payer: COMMERCIAL

## 2019-10-14 VITALS
BODY MASS INDEX: 49.06 KG/M2 | SYSTOLIC BLOOD PRESSURE: 122 MMHG | DIASTOLIC BLOOD PRESSURE: 84 MMHG | HEIGHT: 63 IN | WEIGHT: 276.88 LBS

## 2019-10-14 DIAGNOSIS — D25.1 FIBROIDS, INTRAMURAL: Primary | ICD-10-CM

## 2019-10-14 PROCEDURE — 99999 PR PBB SHADOW E&M-EST. PATIENT-LVL III: ICD-10-PCS | Mod: PBBFAC,,, | Performed by: OBSTETRICS & GYNECOLOGY

## 2019-10-14 PROCEDURE — 99244 PR OFFICE CONSULTATION,LEVEL IV: ICD-10-PCS | Mod: S$GLB,,, | Performed by: OBSTETRICS & GYNECOLOGY

## 2019-10-14 PROCEDURE — 99999 PR PBB SHADOW E&M-EST. PATIENT-LVL III: CPT | Mod: PBBFAC,,, | Performed by: OBSTETRICS & GYNECOLOGY

## 2019-10-14 PROCEDURE — 99244 OFF/OP CNSLTJ NEW/EST MOD 40: CPT | Mod: S$GLB,,, | Performed by: OBSTETRICS & GYNECOLOGY

## 2019-10-14 RX ORDER — I.V. FAT EMULSION 20 G/100ML
EMULSION INTRAVENOUS
Refills: 3 | COMMUNITY
Start: 2019-08-29 | End: 2021-08-02

## 2019-10-14 RX ORDER — INTRAVENOUS CATHETER 24GX3/4"
INFUSION SETS-PARAPHERNALIA MISCELLANEOUS
Refills: 3 | COMMUNITY
Start: 2019-08-29 | End: 2022-10-12

## 2019-10-14 RX ORDER — LETROZOLE 2.5 MG/1
2.5 TABLET, FILM COATED ORAL 2 TIMES DAILY
Status: ON HOLD | COMMUNITY
End: 2021-08-05 | Stop reason: ALTCHOICE

## 2019-10-14 NOTE — PROGRESS NOTES
CC: Symptoms related to fibroids    Zaida Chung is a 38 y.o. female  presents for a consultation from Dr. Radha Dumont for management of fibroids.      Patient has had IVF x 2 cycles with [facility] in [place], [place].  Most recent ultrasounds show 3 uterine fibroids.  [facility] ARTEAGA concerned that fibroids in the lower uterine segment near cervix might be increasing risks for SAB.  [facility] ARTEAGA suggested myomectomy.    She and her partner did not do PGS.    MRI shows:    FINDINGS:  UTERUS: The uterus measures 5.9 x 5.1 x 7.2 cm.  There is smooth, broad fundal indentation in keeping with arcuate appearance.  Largest fibroid, partially exophytic arising from the right posterolateral aspect of the lower uterine segment measuring 4.4 cm.  Post-contrast images demonstrate heterogeneous areas of central hypoenhancement.  At least 3 additional smaller fibroids, mostly subserosal arising from the uterine body and fundus with enhancement overall similar to that of adjacent myometrium.    CERVIX AND VAGINA: Cervix junctional zone and vagina appear normal.    OVARIES: Normal appearance with ovarian follicles    LOWER ABDOMINAL FINDINGS: No bowel obstruction or abnormal bowel wall thickening.    LYMPH NODES: None enlarged.    URINARY BLADDER: Normal    Other: Mild free fluid within the cul de sac, nonspecific and possibly physiologic.      Impression       Intrauterine fibroids with associated enhancement suggesting viability.  The largest of these near the right, posterior lower uterine segment demonstrates areas of heterogeneous central hypoenhancement.    Smooth, broad fundal indentation of the uterus in keeping with arcuate appearance.         Past Medical History:   Diagnosis Date    Nuclear cataract 2017       Past Surgical History:   Procedure Laterality Date    ABDOMINAL SURGERY      BREAST BIOPSY      CHOLECYSTECTOMY      CORNEAL TRANSPLANT Left 16    Dr Sharp    CORNEAL TRANSPLANT Right 2017  "   Dr. Sharp    DILATION AND CURETTAGE OF UTERUS      gallbalmackenzie N/A 2015    HERNIA REPAIR      HIATAL HERNIA REPAIR  2015    STOMACH SURGERY  Gastric sleeve       Family History   Problem Relation Age of Onset    Breast cancer Maternal Grandmother     Diabetes Mother     Hypertension Mother     Glaucoma Mother     Breast cancer Maternal Aunt     Breast cancer Paternal Aunt     No Known Problems Father     No Known Problems Sister     No Known Problems Brother     No Known Problems Maternal Uncle     No Known Problems Paternal Uncle     No Known Problems Paternal Grandmother     No Known Problems Paternal Grandfather     Breast cancer Maternal Aunt     Breast cancer Maternal Aunt     Breast cancer Maternal Aunt     Breast cancer Maternal Aunt     Amblyopia Neg Hx     Blindness Neg Hx     Cancer Neg Hx     Cataracts Neg Hx     Macular degeneration Neg Hx     Retinal detachment Neg Hx     Strabismus Neg Hx     Stroke Neg Hx     Thyroid disease Neg Hx     Colon cancer Neg Hx     Ovarian cancer Neg Hx        Social History     Tobacco Use    Smoking status: Never Smoker    Smokeless tobacco: Never Used   Substance Use Topics    Alcohol use: Not Currently    Drug use: No       OB History    Para Term  AB Living   2 1 0 1 1     SAB TAB Ectopic Multiple Live Births   1     0        # Outcome Date GA Lbr Marino/2nd Weight Sex Delivery Anes PTL Lv   2  19 22w1d  0.465 kg (1 lb 0.4 oz) M Vag-Spont EPI Y FD   1 SAB                /84   Ht 5' 3" (1.6 m)   Wt 125.6 kg (276 lb 14.4 oz)   BMI 49.05 kg/m²     ROS:  GENERAL: Denies weight gain or weight loss. Feeling well overall.   SKIN: Denies rash or lesions.   HEAD: Denies head injury or headache.   NODES: Denies enlarged lymph nodes.   CHEST: Denies chest pain or shortness of breath.   CARDIOVASCULAR: Denies palpitations or left sided chest pain.   ABDOMEN: No abdominal pain, constipation, diarrhea, " nausea, vomiting or rectal bleeding.   URINARY: No frequency, dysuria, hematuria, or burning on urination.  REPRODUCTIVE: See HPI.   HEMATOLOGIC: No easy bruisability or excessive bleeding.  MUSCULOSKELETAL: Denies joint pain or swelling.   NEUROLOGIC: Denies syncope or weakness.   PSYCHIATRIC: Denies depression, anxiety or mood swings.    PHYSICAL EXAM:  Deferred      ICD-10-CM ICD-9-CM    1. Fibroids, intramural D25.1 218.1          Patient was counseled today on treatment options for fibroids.  In review of the MRI images, the largest fibroid is identified and found to be close to the cervix, not impinging on the cervix.  Discussed that it is unclear if the fibroids are impacting and causing her miscarriages.  Discussed risks of procedure including bleeding, infection, possible need for hysterectomy.  At this time, would not recommend myomectomy.  Patient and partner agree.    RTC PRN

## 2019-10-16 ENCOUNTER — OFFICE VISIT (OUTPATIENT)
Dept: OPHTHALMOLOGY | Facility: CLINIC | Age: 39
End: 2019-10-16
Payer: COMMERCIAL

## 2019-10-16 DIAGNOSIS — H18.603 KERATOCONUS, BILATERAL: Primary | ICD-10-CM

## 2019-10-16 DIAGNOSIS — T86.8409 CORNEAL GRAFT REJECTION: ICD-10-CM

## 2019-10-16 DIAGNOSIS — H40.043 STEROID RESPONDERS, BILATERAL: ICD-10-CM

## 2019-10-16 PROCEDURE — 92014 PR EYE EXAM, EST PATIENT,COMPREHESV: ICD-10-PCS | Mod: S$GLB,,, | Performed by: OPHTHALMOLOGY

## 2019-10-16 PROCEDURE — 99999 PR PBB SHADOW E&M-EST. PATIENT-LVL II: ICD-10-PCS | Mod: PBBFAC,,, | Performed by: OPHTHALMOLOGY

## 2019-10-16 PROCEDURE — 99999 PR PBB SHADOW E&M-EST. PATIENT-LVL II: CPT | Mod: PBBFAC,,, | Performed by: OPHTHALMOLOGY

## 2019-10-16 PROCEDURE — 92014 COMPRE OPH EXAM EST PT 1/>: CPT | Mod: S$GLB,,, | Performed by: OPHTHALMOLOGY

## 2019-10-16 RX ORDER — DORZOLAMIDE HYDROCHLORIDE AND TIMOLOL MALEATE 20; 5 MG/ML; MG/ML
1 SOLUTION/ DROPS OPHTHALMIC 2 TIMES DAILY
Qty: 10 ML | Refills: 3 | Status: SHIPPED | OUTPATIENT
Start: 2019-10-16 | End: 2020-12-23 | Stop reason: SDUPTHER

## 2019-10-16 NOTE — PROGRESS NOTES
HPI     Follow-up      Additional comments: IOP check               Comments     DANK 08/07/2019     Patient here for a IOP check due to long term use of Durezol.  Patient not denies any changes since last visit   No problems at this time   Patient compliant with drops as directed     Drops   Durezol BID OS   FML BID OD           Last edited by Alberto Little on 10/16/2019  4:01 PM. (History)            Assessment /Plan     For exam results, see Encounter Report.    Keratoconus, bilateral    Corneal graft rejection    Steroid responders, bilateral      OD PKP stable and clear. Signs and symptoms of graft rejection reviewed.  OS PKP/DSEK with dried K line and still some edema, but decent VA.  FML OD/Durezol OS  IOP elevated, so start cosopt bid OU

## 2019-10-29 ENCOUNTER — HOSPITAL ENCOUNTER (OUTPATIENT)
Dept: RADIOLOGY | Facility: OTHER | Age: 39
Discharge: HOME | End: 2019-10-29
Attending: OBSTETRICS & GYNECOLOGY
Payer: COMMERCIAL

## 2019-10-29 DIAGNOSIS — E28.9 OVARIAN DYSFUNCTION: ICD-10-CM

## 2019-10-29 PROCEDURE — 76856 US EXAM PELVIC COMPLETE: CPT | Mod: 26,,, | Performed by: RADIOLOGY

## 2019-10-29 PROCEDURE — 76856 US PELVIS COMP WITH TRANSVAG NON-OB (XPD): ICD-10-PCS | Mod: 26,,, | Performed by: RADIOLOGY

## 2019-10-29 PROCEDURE — 76856 US EXAM PELVIC COMPLETE: CPT | Mod: TC

## 2019-10-29 PROCEDURE — 76830 US PELVIS COMP WITH TRANSVAG NON-OB (XPD): ICD-10-PCS | Mod: 26,,, | Performed by: RADIOLOGY

## 2019-10-29 PROCEDURE — 76830 TRANSVAGINAL US NON-OB: CPT | Mod: 26,,, | Performed by: RADIOLOGY

## 2019-11-05 ENCOUNTER — HOSPITAL ENCOUNTER (OUTPATIENT)
Dept: RADIOLOGY | Facility: OTHER | Age: 39
Discharge: HOME | End: 2019-11-05
Attending: OBSTETRICS & GYNECOLOGY
Payer: COMMERCIAL

## 2019-11-05 DIAGNOSIS — E28.9 OVARIAN DYSFUNCTION: ICD-10-CM

## 2019-11-05 PROCEDURE — 76856 US PELVIS COMP WITH TRANSVAG NON-OB (XPD): ICD-10-PCS | Mod: 26,,, | Performed by: RADIOLOGY

## 2019-11-05 PROCEDURE — 76856 US EXAM PELVIC COMPLETE: CPT | Mod: 26,,, | Performed by: RADIOLOGY

## 2019-11-05 PROCEDURE — 76830 TRANSVAGINAL US NON-OB: CPT | Mod: TC

## 2019-11-05 PROCEDURE — 76830 US PELVIS COMP WITH TRANSVAG NON-OB (XPD): ICD-10-PCS | Mod: 26,,, | Performed by: RADIOLOGY

## 2019-11-05 PROCEDURE — 76830 TRANSVAGINAL US NON-OB: CPT | Mod: 26,,, | Performed by: RADIOLOGY

## 2019-11-13 ENCOUNTER — HOSPITAL ENCOUNTER (OUTPATIENT)
Dept: RADIOLOGY | Facility: OTHER | Age: 39
Discharge: HOME | End: 2019-11-13
Attending: OBSTETRICS & GYNECOLOGY
Payer: COMMERCIAL

## 2019-11-13 DIAGNOSIS — E28.9 OVARIAN DYSFUNCTION: ICD-10-CM

## 2019-11-13 PROCEDURE — 76830 US PELVIS COMP WITH TRANSVAG NON-OB (XPD): ICD-10-PCS | Mod: 26,,, | Performed by: INTERNAL MEDICINE

## 2019-11-13 PROCEDURE — 76856 US PELVIS COMP WITH TRANSVAG NON-OB (XPD): ICD-10-PCS | Mod: 26,,, | Performed by: INTERNAL MEDICINE

## 2019-11-13 PROCEDURE — 76830 TRANSVAGINAL US NON-OB: CPT | Mod: 26,,, | Performed by: INTERNAL MEDICINE

## 2019-11-13 PROCEDURE — 76856 US EXAM PELVIC COMPLETE: CPT | Mod: 26,,, | Performed by: INTERNAL MEDICINE

## 2019-11-13 PROCEDURE — 76830 TRANSVAGINAL US NON-OB: CPT | Mod: TC

## 2019-11-22 ENCOUNTER — HOSPITAL ENCOUNTER (OUTPATIENT)
Dept: RADIOLOGY | Facility: OTHER | Age: 39
Discharge: HOME | End: 2019-11-22
Attending: OBSTETRICS & GYNECOLOGY
Payer: COMMERCIAL

## 2019-11-22 ENCOUNTER — OFFICE VISIT (OUTPATIENT)
Dept: OPTOMETRY | Facility: CLINIC | Age: 39
End: 2019-11-22
Payer: COMMERCIAL

## 2019-11-22 DIAGNOSIS — E28.9 OVARIAN DYSFUNCTION: ICD-10-CM

## 2019-11-22 DIAGNOSIS — H52.13 MYOPIA OF BOTH EYES: ICD-10-CM

## 2019-11-22 DIAGNOSIS — Z94.7 CORNEA REPLACED BY TRANSPLANT: Primary | ICD-10-CM

## 2019-11-22 PROCEDURE — 99999 PR PBB SHADOW E&M-EST. PATIENT-LVL III: CPT | Mod: PBBFAC,,, | Performed by: OPTOMETRIST

## 2019-11-22 PROCEDURE — 76856 US EXAM PELVIC COMPLETE: CPT | Mod: TC

## 2019-11-22 PROCEDURE — 76856 US EXAM PELVIC COMPLETE: CPT | Mod: 26,,, | Performed by: RADIOLOGY

## 2019-11-22 PROCEDURE — 76856 US PELVIS COMP WITH TRANSVAG NON-OB (XPD): ICD-10-PCS | Mod: 26,,, | Performed by: RADIOLOGY

## 2019-11-22 PROCEDURE — 99999 PR PBB SHADOW E&M-EST. PATIENT-LVL III: ICD-10-PCS | Mod: PBBFAC,,, | Performed by: OPTOMETRIST

## 2019-11-22 PROCEDURE — 92014 PR EYE EXAM, EST PATIENT,COMPREHESV: ICD-10-PCS | Mod: S$GLB,,, | Performed by: OPTOMETRIST

## 2019-11-22 PROCEDURE — 92015 DETERMINE REFRACTIVE STATE: CPT | Mod: S$GLB,,, | Performed by: OPTOMETRIST

## 2019-11-22 PROCEDURE — 92014 COMPRE OPH EXAM EST PT 1/>: CPT | Mod: S$GLB,,, | Performed by: OPTOMETRIST

## 2019-11-22 PROCEDURE — 76830 TRANSVAGINAL US NON-OB: CPT | Mod: 26,,, | Performed by: RADIOLOGY

## 2019-11-22 PROCEDURE — 76830 US PELVIS COMP WITH TRANSVAG NON-OB (XPD): ICD-10-PCS | Mod: 26,,, | Performed by: RADIOLOGY

## 2019-11-22 PROCEDURE — 92015 PR REFRACTION: ICD-10-PCS | Mod: S$GLB,,, | Performed by: OPTOMETRIST

## 2019-11-22 NOTE — PROGRESS NOTES
HPI     Patient is here for contact ray previous wearer.  Cornea transplant 2018 OS  2017 OD    Pt would like to just do glasses    Last edited by Chay Hitchcock, OD on 11/22/2019  3:13 PM. (History)            Assessment /Plan     For exam results, see Encounter Report.    Cornea replaced by transplant  -Good visual outcome    Myopia of both eyes  Eyeglass Final Rx     Eyeglass Final Rx       Sphere Cylinder [place] Dist VA    Right [place] +1.50 060 20/30--    Left -2.75 +0.50 075 20/70+    Type:  SVL    Expiration Date:  11/22/2020                  RTC 1 yr

## 2019-11-22 NOTE — LETTER
November 22, 2019      Elvia Sharp MD  [address]  [address]  [facility] [address]           [address]  [address]  [facility] [address]  Phone: [phone]  Fax: [phone]          Patient: Zaida Chung   MR Number: [id number]   YOB: 1980   Date of Visit: 11/22/2019       Dear Dr. Elvia Sharp:    Thank you for referring Zaida Chung to me for evaluation. Attached you will find relevant portions of my assessment and plan of care.    If you have questions, please do not hesitate to call me. I look forward to following Zaida Chung along with you.    Sincerely,    Chay Hitchcock, OD    Enclosure  CC:  No Recipients    If you would like to receive this communication electronically, please contact [email or web address] or [phone] to request more information on [facility] access.    For providers and/or their staff who would like to refer a patient to [facility], please contact us through our one-stop-shop provider referral line, [facility], at [phone].    If you feel you have received this communication in error or would no longer like to receive these types of communications, please e-mail [email or web address]

## 2019-11-25 ENCOUNTER — HOSPITAL ENCOUNTER (OUTPATIENT)
Dept: RADIOLOGY | Facility: OTHER | Age: 39
Discharge: HOME | End: 2019-11-25
Attending: OBSTETRICS & GYNECOLOGY
Payer: COMMERCIAL

## 2019-11-25 DIAGNOSIS — E28.9 OVARIAN DYSFUNCTION, UNSPECIFIED: ICD-10-CM

## 2019-11-25 PROCEDURE — 76856 US PELVIS COMP WITH TRANSVAG NON-OB (XPD): ICD-10-PCS | Mod: 26,,, | Performed by: RADIOLOGY

## 2019-11-25 PROCEDURE — 76830 TRANSVAGINAL US NON-OB: CPT | Mod: TC

## 2019-11-25 PROCEDURE — 76856 US EXAM PELVIC COMPLETE: CPT | Mod: 26,,, | Performed by: RADIOLOGY

## 2019-11-25 PROCEDURE — 76830 US PELVIS COMP WITH TRANSVAG NON-OB (XPD): ICD-10-PCS | Mod: 26,,, | Performed by: RADIOLOGY

## 2019-11-25 PROCEDURE — 76830 TRANSVAGINAL US NON-OB: CPT | Mod: 26,,, | Performed by: RADIOLOGY

## 2019-12-09 ENCOUNTER — LAB VISIT (OUTPATIENT)
Dept: LAB | Facility: OTHER | Age: 39
End: 2019-12-09
Attending: OBSTETRICS & GYNECOLOGY
Payer: COMMERCIAL

## 2019-12-09 DIAGNOSIS — E28.9 DYSFUNCTION OVARIAN: Primary | ICD-10-CM

## 2019-12-09 LAB
ESTRADIOL SERPL-MCNC: 136 PG/ML
TSH SERPL DL<=0.005 MIU/L-ACNC: 1.14 UIU/ML (ref 0.4–4)

## 2019-12-09 PROCEDURE — 82670 ASSAY OF TOTAL ESTRADIOL: CPT

## 2019-12-09 PROCEDURE — 36415 COLL VENOUS BLD VENIPUNCTURE: CPT

## 2019-12-09 PROCEDURE — 84144 ASSAY OF PROGESTERONE: CPT

## 2019-12-09 PROCEDURE — 84443 ASSAY THYROID STIM HORMONE: CPT

## 2019-12-10 LAB — PROGEST SERPL-MCNC: 60.3 NG/ML

## 2019-12-12 ENCOUNTER — LAB VISIT (OUTPATIENT)
Dept: LAB | Facility: OTHER | Age: 39
End: 2019-12-12
Attending: OBSTETRICS & GYNECOLOGY
Payer: COMMERCIAL

## 2019-12-12 DIAGNOSIS — E28.9 DYSFUNCTION OVARIAN: Primary | ICD-10-CM

## 2019-12-12 LAB
HCG INTACT+B SERPL-ACNC: 19 MIU/ML
PROGEST SERPL-MCNC: 51.5 NG/ML

## 2019-12-12 PROCEDURE — 84702 CHORIONIC GONADOTROPIN TEST: CPT

## 2019-12-12 PROCEDURE — 36415 COLL VENOUS BLD VENIPUNCTURE: CPT

## 2019-12-12 PROCEDURE — 84144 ASSAY OF PROGESTERONE: CPT

## 2019-12-14 ENCOUNTER — LAB VISIT (OUTPATIENT)
Dept: LAB | Facility: HOSPITAL | Age: 39
End: 2019-12-14
Payer: COMMERCIAL

## 2019-12-14 DIAGNOSIS — Z32.01 PREGNANCY EXAMINATION OR TEST, POSITIVE RESULT: Primary | ICD-10-CM

## 2019-12-14 LAB
ESTRADIOL SERPL-MCNC: 104 PG/ML
HCG INTACT+B SERPL-ACNC: 6 MIU/ML
PROGEST SERPL-MCNC: 45.1 NG/ML
TSH SERPL DL<=0.005 MIU/L-ACNC: 1.32 UIU/ML (ref 0.4–4)

## 2019-12-14 PROCEDURE — 84443 ASSAY THYROID STIM HORMONE: CPT

## 2019-12-14 PROCEDURE — 82670 ASSAY OF TOTAL ESTRADIOL: CPT

## 2019-12-14 PROCEDURE — 84702 CHORIONIC GONADOTROPIN TEST: CPT

## 2019-12-14 PROCEDURE — 36415 COLL VENOUS BLD VENIPUNCTURE: CPT

## 2019-12-14 PROCEDURE — 84144 ASSAY OF PROGESTERONE: CPT

## 2019-12-18 ENCOUNTER — LAB VISIT (OUTPATIENT)
Dept: LAB | Facility: OTHER | Age: 39
End: 2019-12-18
Attending: OBSTETRICS & GYNECOLOGY
Payer: COMMERCIAL

## 2019-12-18 ENCOUNTER — OFFICE VISIT (OUTPATIENT)
Dept: OPHTHALMOLOGY | Facility: CLINIC | Age: 39
End: 2019-12-18
Payer: COMMERCIAL

## 2019-12-18 DIAGNOSIS — H18.603 KERATOCONUS, BILATERAL: ICD-10-CM

## 2019-12-18 DIAGNOSIS — Z94.7 STATUS POST CORNEAL TRANSPLANT: ICD-10-CM

## 2019-12-18 DIAGNOSIS — Z32.01 PREGNANCY EXAMINATION OR TEST, POSITIVE RESULT: Primary | ICD-10-CM

## 2019-12-18 DIAGNOSIS — T86.8409 CORNEAL GRAFT REJECTION: Primary | ICD-10-CM

## 2019-12-18 DIAGNOSIS — H40.043 STEROID RESPONDERS, BILATERAL: ICD-10-CM

## 2019-12-18 LAB
ESTRADIOL SERPL-MCNC: 92 PG/ML
HCG INTACT+B SERPL-ACNC: <1.2 MIU/ML
PROGEST SERPL-MCNC: 51.3 NG/ML
TSH SERPL DL<=0.005 MIU/L-ACNC: 1.4 UIU/ML (ref 0.4–4)

## 2019-12-18 PROCEDURE — 84702 CHORIONIC GONADOTROPIN TEST: CPT

## 2019-12-18 PROCEDURE — 36415 COLL VENOUS BLD VENIPUNCTURE: CPT

## 2019-12-18 PROCEDURE — 99999 PR PBB SHADOW E&M-EST. PATIENT-LVL II: CPT | Mod: PBBFAC,,, | Performed by: OPHTHALMOLOGY

## 2019-12-18 PROCEDURE — 99999 PR PBB SHADOW E&M-EST. PATIENT-LVL II: ICD-10-PCS | Mod: PBBFAC,,, | Performed by: OPHTHALMOLOGY

## 2019-12-18 PROCEDURE — 82670 ASSAY OF TOTAL ESTRADIOL: CPT

## 2019-12-18 PROCEDURE — 84443 ASSAY THYROID STIM HORMONE: CPT

## 2019-12-18 PROCEDURE — 92014 PR EYE EXAM, EST PATIENT,COMPREHESV: ICD-10-PCS | Mod: S$GLB,,, | Performed by: OPHTHALMOLOGY

## 2019-12-18 PROCEDURE — 92014 COMPRE OPH EXAM EST PT 1/>: CPT | Mod: S$GLB,,, | Performed by: OPHTHALMOLOGY

## 2019-12-18 PROCEDURE — 84144 ASSAY OF PROGESTERONE: CPT

## 2019-12-18 RX ORDER — DORZOLAMIDE HYDROCHLORIDE AND TIMOLOL MALEATE 20; 5 MG/ML; MG/ML
1 SOLUTION/ DROPS OPHTHALMIC 2 TIMES DAILY
Qty: 10 ML | Refills: 6 | Status: SHIPPED | OUTPATIENT
Start: 2019-12-18 | End: 2020-12-23

## 2019-12-18 RX ORDER — LATANOPROST 50 UG/ML
1 SOLUTION/ DROPS OPHTHALMIC DAILY
Qty: 5 ML | Refills: 6 | Status: SHIPPED | OUTPATIENT
Start: 2019-12-18 | End: 2020-12-17

## 2019-12-18 NOTE — PROGRESS NOTES
HPI     S/p removal of suture OD (7/19/19)  S/p DSEK OS (5/10/18).    Pt states that vision seems to be stable OU for distance and near since   last exam. Denies flashes and floaters OU. No pain or discomfort OU.    Pt is now on 10mg oral pred    FML BID OD  Durezol BID OS  Cosopt BID OU        Last edited by Jocelyn Petersen on 12/18/2019 10:30 AM. (History)            Assessment /Plan     For exam results, see Encounter Report.    Corneal graft rejection    Steroid responders, bilateral    Keratoconus, bilateral    Status post corneal transplant      IOP still elevated on Timolol with FML/Durezol  Steroid dependent to control rejection OS, so cont steroids, add Xalatan/ Cosopt (d/c timolol)

## 2019-12-27 ENCOUNTER — LAB VISIT (OUTPATIENT)
Dept: LAB | Facility: OTHER | Age: 39
End: 2019-12-27
Attending: OBSTETRICS & GYNECOLOGY
Payer: COMMERCIAL

## 2019-12-27 DIAGNOSIS — N96: Primary | ICD-10-CM

## 2019-12-27 LAB — HCYS SERPL-SCNC: 8.7 UMOL/L (ref 4–15.5)

## 2019-12-27 PROCEDURE — 85306 CLOT INHIBIT PROT S FREE: CPT

## 2019-12-27 PROCEDURE — 85220 BLOOC CLOT FACTOR V TEST: CPT

## 2019-12-27 PROCEDURE — 86147 CARDIOLIPIN ANTIBODY EA IG: CPT

## 2019-12-27 PROCEDURE — 85300 ANTITHROMBIN III ACTIVITY: CPT

## 2019-12-27 PROCEDURE — 86038 ANTINUCLEAR ANTIBODIES: CPT

## 2019-12-27 PROCEDURE — 81243 FMR1 GEN ALY DETC ABNL ALLEL: CPT

## 2019-12-27 PROCEDURE — 85613 RUSSELL VIPER VENOM DILUTED: CPT

## 2019-12-27 PROCEDURE — 86146 BETA-2 GLYCOPROTEIN ANTIBODY: CPT

## 2019-12-27 PROCEDURE — 36415 COLL VENOUS BLD VENIPUNCTURE: CPT

## 2019-12-27 PROCEDURE — 85303 CLOT INHIBIT PROT C ACTIVITY: CPT

## 2019-12-27 PROCEDURE — 83090 ASSAY OF HOMOCYSTEINE: CPT

## 2019-12-27 PROCEDURE — 85210 CLOT FACTOR II PROTHROM SPEC: CPT

## 2019-12-30 LAB
ANA SER QL IF: NORMAL
AT III ACT/NOR PPP CHRO: 111 % (ref 83–118)
FACT V ACT/NOR PPP: 142 % (ref 60–145)
PROTHROM ACT/NOR PPP: 137 % (ref 70–130)

## 2019-12-31 LAB
B2 GLYCOPROT1 IGA SER QL: <9 SAU
B2 GLYCOPROT1 IGG SER QL: <9 SGU
B2 GLYCOPROT1 IGM SER QL: <9 SMU
CARDIOLIPIN IGG SER IA-ACNC: <9.4 GPL (ref 0–14.99)
CARDIOLIPIN IGM SER IA-ACNC: <9.4 MPL (ref 0–12.49)
LA PPP-IMP: NEGATIVE
PROT S FREE AG ACT/NOR PPP IA: 83 % (ref 50–147)

## 2020-01-03 LAB
APTT PROTEIN C ACTIVATOR+FV DP/APTT PPP: 99 % (ref 70–140)
FMR1 GENE MUT ANL BLD/T: NORMAL
FRAGILE X MOLECULAR ANALYSIS RELEASED BY: NORMAL
FRAGILE X MOLECULAR ANALYSIS RESULT SUMMARY: NEGATIVE
FRAGILE X SPECIMEN: NORMAL
FRAGILE X, REASON FOR REFERRAL: NORMAL
GENETICIST REVIEW: NORMAL
REF LAB TEST METHOD: NORMAL
SPECIMEN SOURCE: NORMAL

## 2020-02-12 ENCOUNTER — TELEPHONE (OUTPATIENT)
Dept: OPHTHALMOLOGY | Facility: CLINIC | Age: 40
End: 2020-02-12

## 2020-02-12 ENCOUNTER — HOSPITAL ENCOUNTER (OUTPATIENT)
Dept: RADIOLOGY | Facility: OTHER | Age: 40
Discharge: HOME | End: 2020-02-12
Attending: OBSTETRICS & GYNECOLOGY
Payer: COMMERCIAL

## 2020-02-12 DIAGNOSIS — E28.9 OVARIAN DYSFUNCTION: ICD-10-CM

## 2020-02-12 PROCEDURE — 76830 TRANSVAGINAL US NON-OB: CPT | Mod: TC

## 2020-02-12 PROCEDURE — 76830 US PELVIS COMP WITH TRANSVAG NON-OB (XPD): ICD-10-PCS | Mod: 26,,, | Performed by: RADIOLOGY

## 2020-02-12 PROCEDURE — 76856 US EXAM PELVIC COMPLETE: CPT | Mod: 26,,, | Performed by: RADIOLOGY

## 2020-02-12 PROCEDURE — 76830 TRANSVAGINAL US NON-OB: CPT | Mod: 26,,, | Performed by: RADIOLOGY

## 2020-02-12 PROCEDURE — 76856 US PELVIS COMP WITH TRANSVAG NON-OB (XPD): ICD-10-PCS | Mod: 26,,, | Performed by: RADIOLOGY

## 2020-02-12 NOTE — TELEPHONE ENCOUNTER
----- Message from Nelson Martin sent at 2/12/2020  9:26 AM CST -----  Contact: pt  Calling to reschedule 02/12 appt     Call back: [phone]

## 2020-02-18 ENCOUNTER — HOSPITAL ENCOUNTER (OUTPATIENT)
Dept: RADIOLOGY | Facility: OTHER | Age: 40
Discharge: HOME | End: 2020-02-18
Attending: OBSTETRICS & GYNECOLOGY
Payer: COMMERCIAL

## 2020-02-18 DIAGNOSIS — E28.9 OVARIAN DYSFUNCTION: ICD-10-CM

## 2020-02-18 PROCEDURE — 76830 TRANSVAGINAL US NON-OB: CPT | Mod: TC

## 2020-02-18 PROCEDURE — 76856 US PELVIS COMP WITH TRANSVAG NON-OB (XPD): ICD-10-PCS | Mod: 26,,, | Performed by: RADIOLOGY

## 2020-02-18 PROCEDURE — 76830 US PELVIS COMP WITH TRANSVAG NON-OB (XPD): ICD-10-PCS | Mod: 26,,, | Performed by: RADIOLOGY

## 2020-02-18 PROCEDURE — 76856 US EXAM PELVIC COMPLETE: CPT | Mod: 26,,, | Performed by: RADIOLOGY

## 2020-02-18 PROCEDURE — 76830 TRANSVAGINAL US NON-OB: CPT | Mod: 26,,, | Performed by: RADIOLOGY

## 2020-02-20 ENCOUNTER — HOSPITAL ENCOUNTER (OUTPATIENT)
Dept: RADIOLOGY | Facility: OTHER | Age: 40
Discharge: HOME | End: 2020-02-20
Attending: OBSTETRICS & GYNECOLOGY
Payer: COMMERCIAL

## 2020-02-20 DIAGNOSIS — E28.9 OVARIAN DYSFUNCTION: ICD-10-CM

## 2020-02-20 PROCEDURE — 76856 US PELVIS COMP WITH TRANSVAG NON-OB (XPD): ICD-10-PCS | Mod: 26,,, | Performed by: RADIOLOGY

## 2020-02-20 PROCEDURE — 76856 US EXAM PELVIC COMPLETE: CPT | Mod: 26,,, | Performed by: RADIOLOGY

## 2020-02-20 PROCEDURE — 76830 TRANSVAGINAL US NON-OB: CPT | Mod: 26,,, | Performed by: RADIOLOGY

## 2020-02-20 PROCEDURE — 76830 TRANSVAGINAL US NON-OB: CPT | Mod: TC

## 2020-02-20 PROCEDURE — 76830 US PELVIS COMP WITH TRANSVAG NON-OB (XPD): ICD-10-PCS | Mod: 26,,, | Performed by: RADIOLOGY

## 2020-03-04 ENCOUNTER — HOSPITAL ENCOUNTER (OUTPATIENT)
Dept: RADIOLOGY | Facility: OTHER | Age: 40
Discharge: HOME | End: 2020-03-04
Attending: OBSTETRICS & GYNECOLOGY
Payer: COMMERCIAL

## 2020-03-04 ENCOUNTER — OFFICE VISIT (OUTPATIENT)
Dept: OPHTHALMOLOGY | Facility: CLINIC | Age: 40
End: 2020-03-04
Payer: COMMERCIAL

## 2020-03-04 ENCOUNTER — LAB VISIT (OUTPATIENT)
Dept: LAB | Facility: OTHER | Age: 40
End: 2020-03-04
Attending: OBSTETRICS & GYNECOLOGY
Payer: COMMERCIAL

## 2020-03-04 DIAGNOSIS — H18.603 KERATOCONUS, BILATERAL: ICD-10-CM

## 2020-03-04 DIAGNOSIS — E28.9 OVARIAN DYSFUNCTION: ICD-10-CM

## 2020-03-04 DIAGNOSIS — E28.9 DISORDER OF ENDOCRINE OVARY: Primary | ICD-10-CM

## 2020-03-04 DIAGNOSIS — Z94.7 STATUS POST CORNEAL TRANSPLANT: ICD-10-CM

## 2020-03-04 DIAGNOSIS — H40.043 STEROID RESPONDERS, BILATERAL: Primary | ICD-10-CM

## 2020-03-04 LAB
ESTRADIOL SERPL-MCNC: 72 PG/ML
FSH SERPL-ACNC: 2.9 MIU/ML
HCG INTACT+B SERPL-ACNC: <1.2 MIU/ML
LH SERPL-ACNC: 1.6 MIU/ML
PROGEST SERPL-MCNC: 0.3 NG/ML
TSH SERPL DL<=0.005 MIU/L-ACNC: 1.48 UIU/ML (ref 0.4–4)

## 2020-03-04 PROCEDURE — 84443 ASSAY THYROID STIM HORMONE: CPT

## 2020-03-04 PROCEDURE — 76830 TRANSVAGINAL US NON-OB: CPT | Mod: 26,,, | Performed by: INTERNAL MEDICINE

## 2020-03-04 PROCEDURE — 76830 US PELVIS COMP WITH TRANSVAG NON-OB (XPD): ICD-10-PCS | Mod: 26,,, | Performed by: INTERNAL MEDICINE

## 2020-03-04 PROCEDURE — 84702 CHORIONIC GONADOTROPIN TEST: CPT

## 2020-03-04 PROCEDURE — 36415 COLL VENOUS BLD VENIPUNCTURE: CPT

## 2020-03-04 PROCEDURE — 92014 PR EYE EXAM, EST PATIENT,COMPREHESV: ICD-10-PCS | Mod: S$GLB,,, | Performed by: OPHTHALMOLOGY

## 2020-03-04 PROCEDURE — 83001 ASSAY OF GONADOTROPIN (FSH): CPT

## 2020-03-04 PROCEDURE — 82670 ASSAY OF TOTAL ESTRADIOL: CPT

## 2020-03-04 PROCEDURE — 76830 TRANSVAGINAL US NON-OB: CPT | Mod: TC

## 2020-03-04 PROCEDURE — 84144 ASSAY OF PROGESTERONE: CPT

## 2020-03-04 PROCEDURE — 83002 ASSAY OF GONADOTROPIN (LH): CPT

## 2020-03-04 PROCEDURE — 76856 US EXAM PELVIC COMPLETE: CPT | Mod: 26,,, | Performed by: INTERNAL MEDICINE

## 2020-03-04 PROCEDURE — 99999 PR PBB SHADOW E&M-EST. PATIENT-LVL II: ICD-10-PCS | Mod: PBBFAC,,, | Performed by: OPHTHALMOLOGY

## 2020-03-04 PROCEDURE — 99999 PR PBB SHADOW E&M-EST. PATIENT-LVL II: CPT | Mod: PBBFAC,,, | Performed by: OPHTHALMOLOGY

## 2020-03-04 PROCEDURE — 92014 COMPRE OPH EXAM EST PT 1/>: CPT | Mod: S$GLB,,, | Performed by: OPHTHALMOLOGY

## 2020-03-04 PROCEDURE — 76856 US PELVIS COMP WITH TRANSVAG NON-OB (XPD): ICD-10-PCS | Mod: 26,,, | Performed by: INTERNAL MEDICINE

## 2020-03-04 RX ORDER — BRIMONIDINE TARTRATE 2 MG/ML
1 SOLUTION/ DROPS OPHTHALMIC 3 TIMES DAILY
Qty: 10 ML | Refills: 3 | Status: SHIPPED | OUTPATIENT
Start: 2020-03-04 | End: 2021-03-04

## 2020-03-04 NOTE — PROGRESS NOTES
HPI     Patient here for a follow-up and IOP check     S/p removal of suture OD (7/19/19)  S/p DSEK OS (5/10/18).    Pt states that vision seems to be stable OU for distance and near since   last exam. Denies flashes and floaters OU. No pain or discomfort OU.    Using drops as directed    FML BID OD  Durezol BID OS  Cosopt BID OU         Last edited by Alberto Little on 3/4/2020  2:37 PM. (History)            Assessment /Plan     For exam results, see Encounter Report.    Steroid responders, bilateral    Keratoconus, bilateral    Status post corneal transplant      PKPs stable and clear. Signs and symptoms of graft rejection reviewed.    IOP still elevated on Xal/Cosopt with FML/Durezol  Steroid dependent to control rejection OS, so needs steroids, add Alphagan tid  Then consult [facility] for IOP control

## 2020-03-11 ENCOUNTER — HOSPITAL ENCOUNTER (OUTPATIENT)
Dept: RADIOLOGY | Facility: OTHER | Age: 40
Discharge: HOME | End: 2020-03-11
Attending: OBSTETRICS & GYNECOLOGY
Payer: COMMERCIAL

## 2020-03-11 ENCOUNTER — LAB VISIT (OUTPATIENT)
Dept: LAB | Facility: OTHER | Age: 40
End: 2020-03-11
Attending: OBSTETRICS & GYNECOLOGY
Payer: COMMERCIAL

## 2020-03-11 DIAGNOSIS — E28.9 DISORDER OF ENDOCRINE OVARY: Primary | ICD-10-CM

## 2020-03-11 DIAGNOSIS — E28.9 OVARIAN DYSFUNCTION: ICD-10-CM

## 2020-03-11 LAB
ESTRADIOL SERPL-MCNC: 163 PG/ML
LH SERPL-ACNC: 4.3 MIU/ML
PROGEST SERPL-MCNC: 0.1 NG/ML

## 2020-03-11 PROCEDURE — 76856 US PELVIS COMP WITH TRANSVAG NON-OB (XPD): ICD-10-PCS | Mod: 26,,, | Performed by: RADIOLOGY

## 2020-03-11 PROCEDURE — 84144 ASSAY OF PROGESTERONE: CPT

## 2020-03-11 PROCEDURE — 76830 US PELVIS COMP WITH TRANSVAG NON-OB (XPD): ICD-10-PCS | Mod: 26,,, | Performed by: RADIOLOGY

## 2020-03-11 PROCEDURE — 76856 US EXAM PELVIC COMPLETE: CPT | Mod: 26,,, | Performed by: RADIOLOGY

## 2020-03-11 PROCEDURE — 83002 ASSAY OF GONADOTROPIN (LH): CPT

## 2020-03-11 PROCEDURE — 76830 TRANSVAGINAL US NON-OB: CPT | Mod: 26,,, | Performed by: RADIOLOGY

## 2020-03-11 PROCEDURE — 82670 ASSAY OF TOTAL ESTRADIOL: CPT

## 2020-03-11 PROCEDURE — 76830 TRANSVAGINAL US NON-OB: CPT | Mod: TC

## 2020-03-11 PROCEDURE — 36415 COLL VENOUS BLD VENIPUNCTURE: CPT

## 2020-03-25 ENCOUNTER — LAB VISIT (OUTPATIENT)
Dept: LAB | Facility: OTHER | Age: 40
End: 2020-03-25
Attending: OBSTETRICS & GYNECOLOGY
Payer: COMMERCIAL

## 2020-03-25 DIAGNOSIS — Z32.00 PREGNANCY EXAMINATION OR TEST, PREGNANCY UNCONFIRMED: Primary | ICD-10-CM

## 2020-03-25 DIAGNOSIS — E28.9 DISORDER OF ENDOCRINE OVARY: ICD-10-CM

## 2020-03-25 LAB
HCG INTACT+B SERPL-ACNC: 7.9 MIU/ML
PROGEST SERPL-MCNC: 21.1 NG/ML

## 2020-03-25 PROCEDURE — 84702 CHORIONIC GONADOTROPIN TEST: CPT

## 2020-03-25 PROCEDURE — 36415 COLL VENOUS BLD VENIPUNCTURE: CPT

## 2020-03-25 PROCEDURE — 84144 ASSAY OF PROGESTERONE: CPT

## 2020-03-27 LAB
ESTRADIOL SERPL-MCNC: 143 PG/ML
PROGEST SERPL-MCNC: 14.8 NG/ML

## 2020-03-30 ENCOUNTER — LAB VISIT (OUTPATIENT)
Dept: LAB | Facility: OTHER | Age: 40
End: 2020-03-30
Payer: COMMERCIAL

## 2020-03-30 DIAGNOSIS — E28.9 DISORDER OF ENDOCRINE OVARY: ICD-10-CM

## 2020-03-30 DIAGNOSIS — Z32.00 PREGNANCY EXAMINATION OR TEST, PREGNANCY UNCONFIRMED: ICD-10-CM

## 2020-03-30 LAB
HCG INTACT+B SERPL-ACNC: 94 MIU/ML
PROGEST SERPL-MCNC: 26.4 NG/ML
PROGEST SERPL-MCNC: 26.4 NG/ML

## 2020-03-30 PROCEDURE — 84702 CHORIONIC GONADOTROPIN TEST: CPT

## 2020-03-30 PROCEDURE — 36415 COLL VENOUS BLD VENIPUNCTURE: CPT

## 2020-03-30 PROCEDURE — 84144 ASSAY OF PROGESTERONE: CPT

## 2020-04-01 ENCOUNTER — CLINICAL SUPPORT (OUTPATIENT)
Dept: INTERNAL MEDICINE | Facility: CLINIC | Age: 40
End: 2020-04-01
Payer: COMMERCIAL

## 2020-04-01 ENCOUNTER — LAB VISIT (OUTPATIENT)
Dept: LAB | Facility: OTHER | Age: 40
End: 2020-04-01
Attending: OBSTETRICS & GYNECOLOGY
Payer: COMMERCIAL

## 2020-04-01 DIAGNOSIS — Z20.822 SUSPECTED 2019 NOVEL CORONAVIRUS INFECTION: Primary | ICD-10-CM

## 2020-04-01 DIAGNOSIS — Z32.01 PREGNANCY EXAMINATION OR TEST, POSITIVE RESULT: Primary | ICD-10-CM

## 2020-04-01 DIAGNOSIS — Z20.822 SUSPECTED 2019 NOVEL CORONAVIRUS INFECTION: ICD-10-CM

## 2020-04-01 LAB
ESTRADIOL SERPL-MCNC: 225 PG/ML
HCG INTACT+B SERPL-ACNC: 177 MIU/ML
PROGEST SERPL-MCNC: 17.3 NG/ML
TSH SERPL DL<=0.005 MIU/L-ACNC: 1.57 UIU/ML (ref 0.4–4)

## 2020-04-01 PROCEDURE — U0002 COVID-19 LAB TEST NON-CDC: HCPCS

## 2020-04-01 PROCEDURE — 36415 COLL VENOUS BLD VENIPUNCTURE: CPT

## 2020-04-01 PROCEDURE — 82670 ASSAY OF TOTAL ESTRADIOL: CPT

## 2020-04-01 PROCEDURE — 84144 ASSAY OF PROGESTERONE: CPT

## 2020-04-01 PROCEDURE — 84702 CHORIONIC GONADOTROPIN TEST: CPT

## 2020-04-01 PROCEDURE — 84443 ASSAY THYROID STIM HORMONE: CPT

## 2020-04-02 ENCOUNTER — TELEPHONE (OUTPATIENT)
Dept: EMERGENCY MEDICINE | Facility: OTHER | Age: 40
End: 2020-04-02

## 2020-04-02 ENCOUNTER — OFFICE VISIT (OUTPATIENT)
Dept: URGENT CARE | Facility: CLINIC | Age: 40
End: 2020-04-02
Payer: COMMERCIAL

## 2020-04-02 ENCOUNTER — NURSE TRIAGE (OUTPATIENT)
Dept: ADMINISTRATIVE | Facility: CLINIC | Age: 40
End: 2020-04-02

## 2020-04-02 VITALS
HEART RATE: 85 BPM | TEMPERATURE: 98 F | BODY MASS INDEX: 47.84 KG/M2 | HEIGHT: 63 IN | WEIGHT: 270 LBS | OXYGEN SATURATION: 96 % | RESPIRATION RATE: 24 BRPM

## 2020-04-02 DIAGNOSIS — U07.1 COVID-19 VIRUS INFECTION: Primary | ICD-10-CM

## 2020-04-02 DIAGNOSIS — J18.9 PNEUMONIA OF LEFT LOWER LOBE DUE TO INFECTIOUS ORGANISM: ICD-10-CM

## 2020-04-02 DIAGNOSIS — R06.00 DYSPNEA, UNSPECIFIED TYPE: ICD-10-CM

## 2020-04-02 DIAGNOSIS — R05.9 COUGH: ICD-10-CM

## 2020-04-02 DIAGNOSIS — Z3A.01 LESS THAN 8 WEEKS GESTATION OF PREGNANCY: ICD-10-CM

## 2020-04-02 LAB — SARS-COV-2 RNA RESP QL NAA+PROBE: DETECTED

## 2020-04-02 PROCEDURE — 99204 PR OFFICE/OUTPT VISIT, NEW, LEVL IV, 45-59 MIN: ICD-10-PCS | Mod: S$GLB,,, | Performed by: PHYSICIAN ASSISTANT

## 2020-04-02 PROCEDURE — 71046 XR CHEST PA AND LATERAL: ICD-10-PCS | Mod: S$GLB,,, | Performed by: RADIOLOGY

## 2020-04-02 PROCEDURE — 99204 OFFICE O/P NEW MOD 45 MIN: CPT | Mod: S$GLB,,, | Performed by: PHYSICIAN ASSISTANT

## 2020-04-02 PROCEDURE — 71046 X-RAY EXAM CHEST 2 VIEWS: CPT | Mod: S$GLB,,, | Performed by: RADIOLOGY

## 2020-04-02 RX ORDER — ALBUTEROL SULFATE 90 UG/1
1-2 AEROSOL, METERED RESPIRATORY (INHALATION) EVERY 6 HOURS PRN
Qty: 18 G | Refills: 0 | Status: SHIPPED | OUTPATIENT
Start: 2020-04-02 | End: 2021-08-02 | Stop reason: CLARIF

## 2020-04-02 RX ORDER — AZITHROMYCIN 250 MG/1
TABLET, FILM COATED ORAL
COMMUNITY
Start: 2020-03-24 | End: 2021-08-02 | Stop reason: CLARIF

## 2020-04-02 RX ORDER — AZITHROMYCIN 250 MG/1
TABLET, FILM COATED ORAL
Qty: 6 TABLET | Refills: 0 | Status: SHIPPED | OUTPATIENT
Start: 2020-04-02 | End: 2021-08-02 | Stop reason: CLARIF

## 2020-04-02 RX ORDER — CLINDAMYCIN HYDROCHLORIDE 300 MG/1
300 CAPSULE ORAL 4 TIMES DAILY
Qty: 28 CAPSULE | Refills: 0 | Status: SHIPPED | OUTPATIENT
Start: 2020-04-02 | End: 2020-04-09

## 2020-04-02 NOTE — TELEPHONE ENCOUNTER
Pt c/o worsening sob. Denies emergency and requests O2 to be evaluated. Advised to go to [facility]. Notified urgent care of pt arrival and gave report.    Reason for Disposition   MILD difficulty breathing (e.g., minimal/no SOB at rest, SOB with walking, pulse <100)    Additional Information   Negative: SEVERE difficulty breathing (e.g., struggling for each breath, speaks in single words)   Negative: Difficult to awaken or acting confused (e.g., disoriented, slurred speech)   Negative: Bluish (or gray) lips or face now   Negative: Shock suspected (e.g., cold/pale/clammy skin, too weak to stand, low BP, rapid pulse)   Negative: Sounds like a life-threatening emergency to the triager   Negative: [1] COVID-19 suspected (e.g., cough, fever, shortness of breath) AND [2] public health department recommends testing   Negative: [1] COVID-19 exposure AND [2] no symptoms   Negative: COVID-19 and Breastfeeding, questions about   Negative: SEVERE or constant chest pain (Exception: mild central chest pain, present only when coughing)   Negative: MODERATE difficulty breathing (e.g., speaks in phrases, SOB even at rest, pulse 100-120)   Negative: Patient sounds very sick or weak to the triager    Protocols used: CORONAVIRUS (COVID-19) DIAGNOSED OR [id number]

## 2020-04-02 NOTE — PROGRESS NOTES
"Subjective:       Patient ID: Zaida Chung is a 39 y.o. female.    Vitals:  height is 5' 3" (1.6 m) and weight is 122.5 kg (270 lb). Her tympanic temperature is 98.3 °F (36.8 °C). Her pulse is 85. Her respiration is 24 (abnormal) and oxygen saturation is 96%.     Chief Complaint: Shortness of Breath    This is a 39 y.o. female who presents today with a chief complaint of cough and shortness of breath at began three days ago. She is 4 weeks pregnant (via IVF).  She's complaining of a cough, dyspnea on exertion and after coughing spells, and chest tightness x 3 days. She has been taking mucinex and tylenol to help relieve her symptoms. Denies LE edema, leg pain, hx of blood clot. No chest pain, paroxyzmal nocturnal dyspnea, or orthopnea. She has been taking plaquenil and prednisone daily because of IVF.    Shortness of Breath   This is a new problem. The current episode started in the past 7 days. The problem occurs constantly. The problem has been waxing and waning. Pertinent negatives include no chest pain, ear pain, fever, headaches, hemoptysis, leg swelling, neck pain, rash, sore throat, sputum production, syncope, vomiting or wheezing. Nothing aggravates the symptoms. Treatments tried: mucinex and tylenol. The treatment provided no relief.       Constitution: Negative for chills, sweating, fatigue, fever and unexpected weight change.   HENT: Negative for ear pain, ear discharge, congestion, sinus pain, sinus pressure, sore throat and voice change.    Neck: Negative for neck pain, neck stiffness and painful lymph nodes.   Cardiovascular: Positive for sob on exertion. Negative for chest trauma, chest pain, leg swelling, palpitations and passing out.   Eyes: Negative for eye redness.   Respiratory: Positive for chest tightness, cough and shortness of breath. Negative for sleep apnea, sputum production, bloody sputum, COPD, stridor, wheezing and asthma.    Gastrointestinal: Negative for nausea and vomiting. "   Musculoskeletal: Negative for pain and muscle ache.   Skin: Negative for rash.   Allergic/Immunologic: Negative for seasonal allergies and asthma.   Neurological: Negative for dizziness, passing out, loss of balance and headaches.   Hematologic/Lymphatic: Negative for swollen lymph nodes.       Objective:      Physical Exam   Constitutional: She is oriented to person, place, and time. She appears well-developed and well-nourished. She is cooperative.  Non-toxic appearance. She does not have a sickly appearance. She does not appear ill. No distress.   Patient very pleasant sitting comfortably in no acute distress.  Non ill-appearing.  Obese   HENT:   Head: Normocephalic and atraumatic.   Right Ear: Hearing normal.   Left Ear: Hearing normal.   Nose: Nose normal.   Mouth/Throat: Mucous membranes are normal.   Eyes: Conjunctivae and lids are normal. No scleral icterus.   Neck: Trachea normal, full passive range of motion without pain and phonation normal. Neck supple. No neck rigidity. No edema and no erythema present.   Cardiovascular: Normal rate, regular rhythm, normal heart sounds, intact distal pulses and normal pulses.   Pulmonary/Chest: Effort normal. No accessory muscle usage or stridor. Tachypnea noted. No bradypnea. No respiratory distress. She has no decreased breath sounds. She has no wheezes. She has no rhonchi. She has rales in the left lower field.   Patient is in no respiratory distress.  She is speaking complete sentences.  No shortness of breath at rest.   Abdominal: Normal appearance.   Musculoskeletal: Normal range of motion. She exhibits no edema or deformity.   No LE edema or leg pain.   Neurological: She is alert and oriented to person, place, and time. She is not disoriented. She displays no atrophy and no tremor. No cranial nerve deficit or sensory deficit. She exhibits normal muscle tone. She displays no seizure activity. Coordination normal. GCS eye subscore is 4. GCS verbal subscore is 5.  GCS motor subscore is 6.   Skin: Skin is warm, dry, intact, not diaphoretic and not pale.   Psychiatric: She has a normal mood and affect. Her speech is normal and behavior is normal. Judgment and thought content normal. Cognition and memory are normal.   Nursing note and vitals reviewed.            Xr Chest Pa And Lateral    Result Date: 4/2/2020  EXAMINATION: XR CHEST PA AND LATERAL CLINICAL HISTORY: COVID-19 TECHNIQUE: PA and lateral views of the chest were performed. COMPARISON: None FINDINGS: There is a postoperative change in the right upper abdominal quadrant.  The trachea is unremarkable.  The cardiomediastinal silhouette is within normal limits.  The hilar structures are unremarkable.  The hemidiaphragms are within normal limits.  There is no evidence of free air beneath the hemidiaphragms.  There are no pleural effusions.  There is no evidence of a pneumothorax.  There is no evidence of pneumomediastinum.  There is a ground-glass airspace opacity in the left lower lobe.  The osseous structures are unremarkable.     Ground-glass airspace opacity in the left lower lobe.  The findings represent viral or bacterial pneumonitis. Electronically signed by: Gennaro Cramer MD Date:    04/02/2020 Time:    18:10    At rest, oxygen 96 which is reproducible. On walking, her oxygen stays above 96 ranging from 95-98 for the most part, once dropped to 93 for a few seconds then back up above 96%. Patient admitted to no dyspnea at rest and very mild dyspnea with walking approx 30 yards. At rest, I recorded respiratory rate of 24. I will begin antibiotics; patient had anaphylactic reaction to penicillins, will refrain from beta lactams, and use clindamycin as alternative in combination with azithromycin, per up-to-date recommendation.  Pt has already been enrolled in covid 14 day home monitoring program, from telehealth visit yesterday. I have given strict ER precautions if she develops any new or worsening symptoms, and  patient expresses understanding. Patient has an [facility], who she has not seen in about 6 months, and has not yet seen for this pregnancy, as she just found out she was pregnant this week. I have placed an urgent referral for pt to follow up /touch base with OBGYN tomorrow for close follow up and monitoring. Patient's wife is a nurse and will be able to monitor as well.  I discussed this case with Dr. Guerrero, who recommended contacting obgyn on call through [facility].   I called the [facility], spoke with Linda who connected me to OBGYN on-call, Dr. Shea Camilo.  I discussed the patient with her.  She will enroll her in the [facility] cov[facility] clinic for pregnant patients. Dr. Camilo is going to reach out to the patient and discuss further treatment, regarding possibly discontinuing prednisone and/or plaquenil. She will have close follow up and monitoring through this clinic.     Assessment:       1. COVID-19 virus infection    2. Cough    3. Pneumonia of left lower lobe due to infectious organism    4. Dyspnea, unspecified type    5. Less than 8 weeks gestation of pregnancy        Plan:         COVID-19 virus infection  -     XR CHEST PA AND LATERAL; Future; Expected date: 04/02/2020  -     Ambulatory referral/consult to Obstetrics / Gynecology    Cough  -     XR CHEST PA AND LATERAL; Future; Expected date: 04/02/2020  -     Ambulatory referral/consult to Obstetrics / Gynecology    Pneumonia of left lower lobe due to infectious organism  -     XR CHEST PA AND LATERAL; Future; Expected date: 04/02/2020  -     azithromycin (Z-DAVIE) 250 MG tablet; Take 2 tablets by mouth on day 1; Take 1 tablet by mouth on days 2-5  Dispense: 6 tablet; Refill: 0  -     albuterol (PROVENTIL/VENTOLIN HFA) 90 mcg/actuation inhaler; Inhale 1-2 puffs into the lungs every 6 (six) hours as needed for Wheezing. Rescue  Dispense: 18 g; Refill: 0  -     Ambulatory referral/consult to Obstetrics /  Gynecology  -     clindamycin (CLEOCIN) 300 MG capsule; Take 1 capsule (300 mg total) by mouth 4 (four) times daily. for 7 days  Dispense: 28 capsule; Refill: 0    Dyspnea, unspecified type  -     Ambulatory referral/consult to Obstetrics / Gynecology    Less than 8 weeks gestation of pregnancy  -     Ambulatory referral/consult to Obstetrics / Gynecology      Patient Instructions   - Rest.    - Drink plenty of fluids.      - Viral upper respiratory infections typically run their course in 10-14 days.     - Tylenol as directed as needed for fever/pain.     - you can take plain Mucinex (guaifenesin)twice a day to help loosen mucous  - warm tea with honey can help with cough. Honey is a natural cough suppressant.    - use albuterol inhaler as needed, as prescribed for wheezing, shortness of breath, coughing spells, chest tightness.    - You have been given an antibiotic (azithromycin and clindamycin) to treat your condition today.   TREATMENT FOR PNEUMONIA SHOULD BE AMOXICILLIN PLUS AZITHROMYCIN, HOWEVER YOU HAVE ANAPHYLACTIC REACTION TO PENICILLINS, SO I WILL ADD CLINDAMYCIN FOR BROADER COVERAGE IN REPLACEMENT OF AMOXICILLIN.  - Please complete the antibiotic as directed on the bottle.   - If you are female and on oral birth control pills, use additional methods to prevent pregnancy while on antibiotics and for one cycle after.   - you can take over-the-counter probiotics during and after antibiotic use to help preserve gut estefani and reduce gastrointestinal symptoms    - Follow up with your PCP or OBGYN as directed in the next 1-2 days.  I have placed an urgent referral for you to follow up closely with your OBGYN.  You can call [phone] to schedule an appointment with the appropriate provider.      - Go to the ER if you develop new or worsening symptoms.     - You must understand that you have received an Urgent Care treatment only and that you may be released before all of your medical problems are known or  treated.   - You, the patient, will arrange for follow up care as instructed.   - If your condition worsens or fails to improve we recommend that you receive another evaluation at the ER immediately or contact your PCP to discuss your concerns or return here.           Pneumonia (Adult)  Pneumonia is an infection deep within the lungs. It is in the small air sacs (alveoli). Pneumonia may be caused by a virus or bacteria. Pneumonia caused by bacteria is usually treated with an antibiotic. Severe cases may need to be treated in the hospital. Milder cases can be treated at home. Symptoms usually start to get better during the first 2 days of treatment.    Home care  Follow these guidelines when caring for yourself at home:  · Rest at home for the first 2 to 3 days, or until you feel stronger. Dont let yourself get overly tired when you go back to your activities.  · Stay away from cigarette smoke - yours or other peoples.  · You may use acetaminophen or ibuprofen to control fever or pain, unless another medicine was prescribed. If you have chronic liver or kidney disease, talk with your healthcare provider before using these medicines. Also talk with your provider if youve had a stomach ulcer or gastrointestinal bleeding. Dont give aspirin to anyone younger than 18 years of age who is ill with a fever. It may cause severe liver damage.  · Your appetite may be poor, so a light diet is fine.  · Drink 6 to 8 glasses of fluids every day to make sure you are getting enough fluids. Beverages can include water, sport drinks, sodas without caffeine, juices, tea, or soup. Fluids will help loosen secretions in the lung. This will make it easier for you to cough up the phlegm (sputum). If you also have heart or kidney disease, check with your healthcare provider before you drink extra fluids.  · Take antibiotic medicine prescribed until it is all gone, even if you are feeling better after a few days.  Follow-up care  Follow  up with your healthcare provider in the next 2 to 3 days, or as advised. This is to be sure the medicine is helping you get better.  If you are 65 or older, you should get a pneumococcal vaccine and a yearly flu (influenza) shot. You should also get these vaccines if you have chronic lung disease like asthma, emphysema, or COPD. Recently, a second type of pneumonia vaccine has become available for everyone over 65 years old. This is in addition to the previous vaccine. Ask your provider about this.  When to seek medical advice  Call your healthcare provider right away if any of these occur:  · You dont get better within the first 48 hours of treatment  · Shortness of breath gets worse  · Rapid breathing (more than 25 breaths per minute)  · Coughing up blood  · Chest pain gets worse with breathing  · Fever of 100.4°F (38°C) or higher that doesnt get better with fever medicine  · Weakness, dizziness, or fainting that gets worse  · Thirst or dry mouth that gets worse  · Sinus pain, headache, or a stiff neck  · Chest pain not caused by coughing  Date Last Reviewed: 1/1/2017 © 2000-2017 [employer]. [address] [place] [address]. All rights reserved. This information is not intended as a substitute for professional medical care. Always follow your healthcare professional's instructions.      Instructions for Patients Awaiting COVID-19 Test Results    You will either be called with your test result or it will be released to the patient portal.  If you have any questions about your test, please visit [email or web address] or call our COVID-19 information line at [phone].    Prevention steps for patients with confirmed or suspected COVID-19       Stay home and stay away from family members and friends. The CDC says, you can leave home after these three things have happened: 1) You have had no fever for at least 72 hours (that is three full days of no fever without the use of  medicine that reduces fevers) 2) AND other symptoms have improved (for example, when your cough or shortness of breath have improved) 3) AND at least 7 days have passed since your symptoms first appeared.   Separate yourself from other people and animals in your home.   Call ahead before visiting your doctor.   Wear a facemask.   Cover your coughs and sneezes.   Wash your hands often with soap and water; hand  can be used, too.   Avoid sharing personal household items.   Wipe down surfaces used daily.   Monitor your symptoms. Seek prompt medical attention if your illness is worsening (e.g., difficulty breathing).    Before seeking care, call your healthcare provider.   If you have a medical emergency and need to call 911, notify the dispatch personnel that you have, or are being evaluated for COVID-19. If possible, put on a facemask before emergency medical services arrive.        Recommended precautions for household members, intimate partners, and caregivers in a home setting of a patient with symptomatic laboratory-confirmed COVID-19 or a patient under investigation.  Household members, intimate partners, and caregivers in the home setting awaiting tests results have close contact with a person with symptomatic, laboratory-confirmed COVID-19 or a person under investigation. Close contacts should monitor their health; they should call their provider right away if they develop symptoms suggestive of COVID-19 (e.g., fever, cough, shortness of breath).    Close contacts should also follow these recommendations:   Make sure that you understand and can help the patient follow their provider's instructions for medication(s) and care. You should help the patient with basic needs in the home and provide support for getting groceries, prescriptions, and other personal needs.   Monitor the patient's symptoms. If the patient is getting sicker, call his or her healthcare provider and tell them that the  patient has laboratory-confirmed COVID-19. If the patient has a medical emergency and you need to call 911, notify the dispatch personnel that the patient has, or is being evaluated for COVID-19.   Household members should stay in another room or be  from the patient. Household members should use a separate bedroom and bathroom, if available.   Prohibit visitors.   Household members should care for any pets in the home.   Make sure that shared spaces in the home have good air flow, such as by an air conditioner or an opened window, weather permitting.   Perform hand hygiene frequently. Wash your hands often with soap and water for at least 20 seconds or use an alcohol-based hand  (that contains > 60% alcohol) covering all surfaces of your hands and rubbing them together until they feel dry. Soap and water should be used preferentially.   Avoid touching your eyes, nose, and mouth.   The patient should wear a facemask. If the patient is not able to wear a facemask (for example, because it causes trouble breathing), caregivers should wear a mask when they are in the same room as the patient.   Wear a disposable facemask and gloves when you touch or have contact with the patient's blood, stool, or body fluids, such as saliva, sputum, nasal mucus, vomit, urine.  o Throw out disposable facemasks and gloves after using them. Do not reuse.  o When removing personal protective equipment, first remove and dispose of gloves. Then, immediately clean your hands with soap and water or alcohol-based hand . Next, remove and dispose of facemask, and immediately clean your hands again with soap and water or alcohol-based hand .   You should not share dishes, drinking glasses, cups, eating utensils, towels, bedding, or other items with the patient. After the patient uses these items, you should wash them thoroughly (see below Wash laundry thoroughly).   Clean all high-touch surfaces,  such as counters, tabletops, doorknobs, bathroom fixtures, toilets, phones, keyboards, tablets, and bedside tables, every day. Also, clean any surfaces that may have blood, stool, or body fluids on them.   Use a household cleaning spray or wipe, according to the label instructions. Labels contain instructions for safe and effective use of the cleaning product including precautions you should take when applying the product, such as wearing gloves and making sure you have good ventilation during use of the product.   Wash laundry thoroughly.  o Immediately remove and wash clothes or bedding that have blood, stool, or body fluids on them.  o Wear disposable gloves while handling soiled items and keep soiled items away from your body. Clean your hands (with soap and water or an alcohol-based hand ) immediately after removing your gloves.  o Read and follow directions on labels of laundry or clothing items and detergent. In general, using a normal laundry detergent according to washing machine instructions and dry thoroughly using the warmest temperatures recommended on the clothing label.   Place all used disposable gloves, facemasks, and other contaminated items in a lined container before disposing of them with other household waste. Clean your hands (with soap and water or an alcohol-based hand ) immediately after handling these items. Soap and water should be used preferentially if hands are visibly dirty.   Discuss any additional questions with your state or local health department or healthcare provider. Check available hours when contacting your local health department.    For more information see CDC link below.      https://www.cdc.gov/coronavirus/2019-ncov/hcp/guidance-prevent-spread.html#precautions        Sources:  [facility] [place] [facility] [place], [place] [facility] and [facility]

## 2020-04-02 NOTE — PATIENT INSTRUCTIONS
- Rest.    - Drink plenty of fluids.      - Viral upper respiratory infections typically run their course in 10-14 days.     - Tylenol as directed as needed for fever/pain.     - you can take plain Mucinex (guaifenesin)twice a day to help loosen mucous  - warm tea with honey can help with cough. Honey is a natural cough suppressant.    - use albuterol inhaler as needed, as prescribed for wheezing, shortness of breath, coughing spells, chest tightness.    - You have been given an antibiotic (azithromycin and clindamycin) to treat your condition today.   TREATMENT FOR PNEUMONIA SHOULD BE AMOXICILLIN PLUS AZITHROMYCIN, HOWEVER YOU HAVE ANAPHYLACTIC REACTION TO PENICILLINS, SO I WILL ADD CLINDAMYCIN FOR BROADER COVERAGE IN REPLACEMENT OF AMOXICILLIN.  - Please complete the antibiotic as directed on the bottle.   - If you are female and on oral birth control pills, use additional methods to prevent pregnancy while on antibiotics and for one cycle after.   - you can take over-the-counter probiotics during and after antibiotic use to help preserve gut estefani and reduce gastrointestinal symptoms    - Follow up with your PCP or OBGYN as directed in the next 1-2 days.  I have placed an urgent referral for you to follow up closely with your OBGYN.  You can call [phone] to schedule an appointment with the appropriate provider.      - Go to the ER if you develop new or worsening symptoms.     - You must understand that you have received an Urgent Care treatment only and that you may be released before all of your medical problems are known or treated.   - You, the patient, will arrange for follow up care as instructed.   - If your condition worsens or fails to improve we recommend that you receive another evaluation at the ER immediately or contact your PCP to discuss your concerns or return here.           Pneumonia (Adult)  Pneumonia is an infection deep within the lungs. It is in the small air sacs (alveoli). Pneumonia  may be caused by a virus or bacteria. Pneumonia caused by bacteria is usually treated with an antibiotic. Severe cases may need to be treated in the hospital. Milder cases can be treated at home. Symptoms usually start to get better during the first 2 days of treatment.    Home care  Follow these guidelines when caring for yourself at home:  · Rest at home for the first 2 to 3 days, or until you feel stronger. Dont let yourself get overly tired when you go back to your activities.  · Stay away from cigarette smoke - yours or other peoples.  · You may use acetaminophen or ibuprofen to control fever or pain, unless another medicine was prescribed. If you have chronic liver or kidney disease, talk with your healthcare provider before using these medicines. Also talk with your provider if youve had a stomach ulcer or gastrointestinal bleeding. Dont give aspirin to anyone younger than 18 years of age who is ill with a fever. It may cause severe liver damage.  · Your appetite may be poor, so a light diet is fine.  · Drink 6 to 8 glasses of fluids every day to make sure you are getting enough fluids. Beverages can include water, sport drinks, sodas without caffeine, juices, tea, or soup. Fluids will help loosen secretions in the lung. This will make it easier for you to cough up the phlegm (sputum). If you also have heart or kidney disease, check with your healthcare provider before you drink extra fluids.  · Take antibiotic medicine prescribed until it is all gone, even if you are feeling better after a few days.  Follow-up care  Follow up with your healthcare provider in the next 2 to 3 days, or as advised. This is to be sure the medicine is helping you get better.  If you are 65 or older, you should get a pneumococcal vaccine and a yearly flu (influenza) shot. You should also get these vaccines if you have chronic lung disease like asthma, emphysema, or COPD. Recently, a second type of pneumonia vaccine has become  available for everyone over 65 years old. This is in addition to the previous vaccine. Ask your provider about this.  When to seek medical advice  Call your healthcare provider right away if any of these occur:  · You dont get better within the first 48 hours of treatment  · Shortness of breath gets worse  · Rapid breathing (more than 25 breaths per minute)  · Coughing up blood  · Chest pain gets worse with breathing  · Fever of 100.4°F (38°C) or higher that doesnt get better with fever medicine  · Weakness, dizziness, or fainting that gets worse  · Thirst or dry mouth that gets worse  · Sinus pain, headache, or a stiff neck  · Chest pain not caused by coughing  Date Last Reviewed: 1/1/2017 © 2000-2017 [employer] [address] [place] [address], [place] [address]. All rights reserved. This information is not intended as a substitute for professional medical care. Always follow your healthcare professional's instructions.      Instructions for Patients Awaiting COVID-19 Test Results    You will either be called with your test result or it will be released to the patient portal.  If you have any questions about your test, please visit [email or web address] or call our COVID-19 information line at [phone].    Prevention steps for patients with confirmed or suspected COVID-19       Stay home and stay away from family members and friends. The CDC says, you can leave home after these three things have happened: 1) You have had no fever for at least 72 hours (that is three full days of no fever without the use of medicine that reduces fevers) 2) AND other symptoms have improved (for example, when your cough or shortness of breath have improved) 3) AND at least 7 days have passed since your symptoms first appeared.   Separate yourself from other people and animals in your home.   Call ahead before visiting your doctor.   Wear a facemask.   Cover your coughs and sneezes.   Wash your hands often  with soap and water; hand  can be used, too.   Avoid sharing personal household items.   Wipe down surfaces used daily.   Monitor your symptoms. Seek prompt medical attention if your illness is worsening (e.g., difficulty breathing).    Before seeking care, call your healthcare provider.   If you have a medical emergency and need to call 911, notify the dispatch personnel that you have, or are being evaluated for COVID-19. If possible, put on a facemask before emergency medical services arrive.        Recommended precautions for household members, intimate partners, and caregivers in a home setting of a patient with symptomatic laboratory-confirmed COVID-19 or a patient under investigation.  Household members, intimate partners, and caregivers in the home setting awaiting tests results have close contact with a person with symptomatic, laboratory-confirmed COVID-19 or a person under investigation. Close contacts should monitor their health; they should call their provider right away if they develop symptoms suggestive of COVID-19 (e.g., fever, cough, shortness of breath).    Close contacts should also follow these recommendations:   Make sure that you understand and can help the patient follow their provider's instructions for medication(s) and care. You should help the patient with basic needs in the home and provide support for getting groceries, prescriptions, and other personal needs.   Monitor the patient's symptoms. If the patient is getting sicker, call his or her healthcare provider and tell them that the patient has laboratory-confirmed COVID-19. If the patient has a medical emergency and you need to call 911, notify the dispatch personnel that the patient has, or is being evaluated for COVID-19.   Household members should stay in another room or be  from the patient. Household members should use a separate bedroom and bathroom, if available.   Prohibit visitors.   Household  members should care for any pets in the home.   Make sure that shared spaces in the home have good air flow, such as by an air conditioner or an opened window, weather permitting.   Perform hand hygiene frequently. Wash your hands often with soap and water for at least 20 seconds or use an alcohol-based hand  (that contains > 60% alcohol) covering all surfaces of your hands and rubbing them together until they feel dry. Soap and water should be used preferentially.   Avoid touching your eyes, nose, and mouth.   The patient should wear a facemask. If the patient is not able to wear a facemask (for example, because it causes trouble breathing), caregivers should wear a mask when they are in the same room as the patient.   Wear a disposable facemask and gloves when you touch or have contact with the patient's blood, stool, or body fluids, such as saliva, sputum, nasal mucus, vomit, urine.  o Throw out disposable facemasks and gloves after using them. Do not reuse.  o When removing personal protective equipment, first remove and dispose of gloves. Then, immediately clean your hands with soap and water or alcohol-based hand . Next, remove and dispose of facemask, and immediately clean your hands again with soap and water or alcohol-based hand .   You should not share dishes, drinking glasses, cups, eating utensils, towels, bedding, or other items with the patient. After the patient uses these items, you should wash them thoroughly (see below Wash laundry thoroughly).   Clean all high-touch surfaces, such as counters, tabletops, doorknobs, bathroom fixtures, toilets, phones, keyboards, tablets, and bedside tables, every day. Also, clean any surfaces that may have blood, stool, or body fluids on them.   Use a household cleaning spray or wipe, according to the label instructions. Labels contain instructions for safe and effective use of the cleaning product including precautions you  should take when applying the product, such as wearing gloves and making sure you have good ventilation during use of the product.   Wash laundry thoroughly.  o Immediately remove and wash clothes or bedding that have blood, stool, or body fluids on them.  o Wear disposable gloves while handling soiled items and keep soiled items away from your body. Clean your hands (with soap and water or an alcohol-based hand ) immediately after removing your gloves.  o Read and follow directions on labels of laundry or clothing items and detergent. In general, using a normal laundry detergent according to washing machine instructions and dry thoroughly using the warmest temperatures recommended on the clothing label.   Place all used disposable gloves, facemasks, and other contaminated items in a lined container before disposing of them with other household waste. Clean your hands (with soap and water or an alcohol-based hand ) immediately after handling these items. Soap and water should be used preferentially if hands are visibly dirty.   Discuss any additional questions with your state or local health department or healthcare provider. Check available hours when contacting your local health department.    For more information see CDC link below.      https://www.cdc.gov/coronavirus/2019-ncov/hcp/guidance-prevent-spread.html#precautions        Sources:  [facility], [facility] and hospitals

## 2020-04-03 NOTE — TELEPHONE ENCOUNTER
I was called by urgent care due to pt with very early IVF pregnancy - about 4 weeks.   She is COVID +.  She overall appeared well at urgent care.  O2 sat 96% on RA.  CXR noted LLL pneumonia, so pt sent home on clindamycin and azithromycin.    Her PNC is with Dr. Gilda Dumont, she was seen by Dr. Craft for consult for myomectomy.   Zaida is on multiple meds due to IVF, including plaquenel, prednisone, lovenox, estrogen, progesterone, and naloxone.    I discussed with pt that most sound fine, but would call her IVF physician tomorrow to see if any changes need to be made now that she is COVID+  I asked her not to come to ob appt, but telemedicine appt should be fine.   Asked to self isolate until 72h afebrile, precautions discussed.     Shea Camilo, DO

## 2020-04-03 NOTE — PROGRESS NOTES
Called pateint and updated with lab results  Enrolled in home monitoring  Stable symptoms    reviewed signs and symptoms that should prompt return to provider or evaluation in the ED

## 2020-04-03 NOTE — PROGRESS NOTES
Attempted adam all patient   Went to urgent care yesterday   CXR wit LLL and started on Az and clinda  UC contacted PCN provider to reveiew medications

## 2020-04-04 ENCOUNTER — TELEPHONE (OUTPATIENT)
Dept: INTERNAL MEDICINE | Facility: CLINIC | Age: 40
End: 2020-04-04

## 2020-04-04 NOTE — TELEPHONE ENCOUNTER
Spoke with patient, went to urgent care, where a resp check was perfomed. Doing a little better today.

## 2020-04-08 ENCOUNTER — LAB VISIT (OUTPATIENT)
Dept: LAB | Facility: OTHER | Age: 40
End: 2020-04-08
Attending: OBSTETRICS & GYNECOLOGY
Payer: COMMERCIAL

## 2020-04-08 DIAGNOSIS — Z32.01 PREGNANCY EXAMINATION OR TEST, POSITIVE RESULT: Primary | ICD-10-CM

## 2020-04-08 LAB
ESTRADIOL SERPL-MCNC: 140 PG/ML
HCG INTACT+B SERPL-ACNC: 1426 MIU/ML
PROGEST SERPL-MCNC: 38.7 NG/ML

## 2020-04-08 PROCEDURE — 82670 ASSAY OF TOTAL ESTRADIOL: CPT

## 2020-04-08 PROCEDURE — 84144 ASSAY OF PROGESTERONE: CPT

## 2020-04-08 PROCEDURE — 84702 CHORIONIC GONADOTROPIN TEST: CPT

## 2020-04-08 PROCEDURE — 36415 COLL VENOUS BLD VENIPUNCTURE: CPT

## 2020-04-15 ENCOUNTER — LAB VISIT (OUTPATIENT)
Dept: LAB | Facility: OTHER | Age: 40
End: 2020-04-15
Attending: OBSTETRICS & GYNECOLOGY
Payer: COMMERCIAL

## 2020-04-15 DIAGNOSIS — Z32.01 PREGNANCY EXAMINATION OR TEST, POSITIVE RESULT: Primary | ICD-10-CM

## 2020-04-15 DIAGNOSIS — O20.0 THREATENED ABORTION: Primary | ICD-10-CM

## 2020-04-15 LAB
ESTRADIOL SERPL-MCNC: 202 PG/ML
HCG INTACT+B SERPL-ACNC: 1422 MIU/ML
PROGEST SERPL-MCNC: 29.3 NG/ML

## 2020-04-15 PROCEDURE — 82670 ASSAY OF TOTAL ESTRADIOL: CPT

## 2020-04-15 PROCEDURE — 36415 COLL VENOUS BLD VENIPUNCTURE: CPT

## 2020-04-15 PROCEDURE — 84702 CHORIONIC GONADOTROPIN TEST: CPT

## 2020-04-15 PROCEDURE — 84144 ASSAY OF PROGESTERONE: CPT

## 2020-04-15 NOTE — PROGRESS NOTES
I spoke to the patient.   She is s/p IVF transfer was March 20, 2020.  Was having serial beta hcg completed.   Beta 4/15/2020 is essentially unchanged from 7 days ago.  Per patient, she started bleeding yesterday.  Patient thinks that she may be having spontaneous miscarriage.  Will repeat serum bhcg this Friday.  Rh positive, rhogam not needed.    Patient also with Positive covid-19 (chart reviewed).  Symptoms included shortness of breath and loss of smell.  She had CXR in urgent care on 4/2/2020 which showed LLL pneumonia.  Appears that patient was provided with zpak, albuterol.  She is feeling much better now and sense of smell has returned x 7 days.    Patient may need repeat CXR in 4-6 wks. Although, not sure if that will be needed - if she is clinically improved.    Will contact patient with test results when available.    babak nolen MD

## 2020-04-17 ENCOUNTER — LAB VISIT (OUTPATIENT)
Dept: LAB | Facility: OTHER | Age: 40
End: 2020-04-17
Attending: OBSTETRICS & GYNECOLOGY
Payer: COMMERCIAL

## 2020-04-17 DIAGNOSIS — Z32.01 PREGNANCY EXAMINATION OR TEST, POSITIVE RESULT: Primary | ICD-10-CM

## 2020-04-17 LAB — HCG INTACT+B SERPL-ACNC: 304 MIU/ML

## 2020-04-17 PROCEDURE — 36415 COLL VENOUS BLD VENIPUNCTURE: CPT

## 2020-04-17 PROCEDURE — 84702 CHORIONIC GONADOTROPIN TEST: CPT

## 2020-04-24 ENCOUNTER — LAB VISIT (OUTPATIENT)
Dept: LAB | Facility: OTHER | Age: 40
End: 2020-04-24
Attending: OBSTETRICS & GYNECOLOGY
Payer: COMMERCIAL

## 2020-04-24 DIAGNOSIS — O02.81 INAPPROPRIATE CHANGE IN QUANTITATIVE HUMAN CHORIONIC GONADOTROPIN (HCG) IN EARLY PREGNANCY: Primary | ICD-10-CM

## 2020-04-24 LAB — HCG INTACT+B SERPL-ACNC: 13 MIU/ML

## 2020-04-24 PROCEDURE — 84702 CHORIONIC GONADOTROPIN TEST: CPT

## 2020-04-24 PROCEDURE — 36415 COLL VENOUS BLD VENIPUNCTURE: CPT

## 2020-05-01 ENCOUNTER — LAB VISIT (OUTPATIENT)
Dept: LAB | Facility: OTHER | Age: 40
End: 2020-05-01
Attending: OBSTETRICS & GYNECOLOGY
Payer: COMMERCIAL

## 2020-05-01 DIAGNOSIS — Z32.00 PREGNANCY EXAMINATION OR TEST, PREGNANCY UNCONFIRMED: Primary | ICD-10-CM

## 2020-05-01 LAB — HCG INTACT+B SERPL-ACNC: 1.7 MIU/ML

## 2020-05-01 PROCEDURE — 36415 COLL VENOUS BLD VENIPUNCTURE: CPT

## 2020-05-01 PROCEDURE — 84702 CHORIONIC GONADOTROPIN TEST: CPT

## 2020-06-02 ENCOUNTER — LAB VISIT (OUTPATIENT)
Dept: LAB | Facility: OTHER | Age: 40
End: 2020-06-02
Attending: OBSTETRICS & GYNECOLOGY
Payer: COMMERCIAL

## 2020-06-02 ENCOUNTER — HOSPITAL ENCOUNTER (OUTPATIENT)
Dept: RADIOLOGY | Facility: OTHER | Age: 40
Discharge: HOME | End: 2020-06-02
Attending: OBSTETRICS & GYNECOLOGY
Payer: COMMERCIAL

## 2020-06-02 DIAGNOSIS — E28.9 DISORDER OF ENDOCRINE OVARY: Primary | ICD-10-CM

## 2020-06-02 DIAGNOSIS — E28.9 OVARIAN DYSFUNCTION, UNSPECIFIED: ICD-10-CM

## 2020-06-02 LAB
ESTRADIOL SERPL-MCNC: 34 PG/ML
FSH SERPL-ACNC: 4.5 MIU/ML
HCG INTACT+B SERPL-ACNC: <1.2 MIU/ML
LH SERPL-ACNC: 2.3 MIU/ML
PROGEST SERPL-MCNC: 0.3 NG/ML
TSH SERPL DL<=0.005 MIU/L-ACNC: 2.81 UIU/ML (ref 0.4–4)

## 2020-06-02 PROCEDURE — 84443 ASSAY THYROID STIM HORMONE: CPT

## 2020-06-02 PROCEDURE — 36415 COLL VENOUS BLD VENIPUNCTURE: CPT

## 2020-06-02 PROCEDURE — 76830 TRANSVAGINAL US NON-OB: CPT | Mod: TC

## 2020-06-02 PROCEDURE — 84144 ASSAY OF PROGESTERONE: CPT

## 2020-06-02 PROCEDURE — 76830 TRANSVAGINAL US NON-OB: CPT | Mod: 26,,, | Performed by: RADIOLOGY

## 2020-06-02 PROCEDURE — 76830 US PELVIS COMP WITH TRANSVAG NON-OB (XPD): ICD-10-PCS | Mod: 26,,, | Performed by: RADIOLOGY

## 2020-06-02 PROCEDURE — 84702 CHORIONIC GONADOTROPIN TEST: CPT

## 2020-06-02 PROCEDURE — 76856 US EXAM PELVIC COMPLETE: CPT | Mod: 26,,, | Performed by: RADIOLOGY

## 2020-06-02 PROCEDURE — 83001 ASSAY OF GONADOTROPIN (FSH): CPT

## 2020-06-02 PROCEDURE — 83002 ASSAY OF GONADOTROPIN (LH): CPT

## 2020-06-02 PROCEDURE — 76856 US PELVIS COMP WITH TRANSVAG NON-OB (XPD): ICD-10-PCS | Mod: 26,,, | Performed by: RADIOLOGY

## 2020-06-02 PROCEDURE — 82670 ASSAY OF TOTAL ESTRADIOL: CPT

## 2020-06-09 ENCOUNTER — HOSPITAL ENCOUNTER (OUTPATIENT)
Dept: RADIOLOGY | Facility: OTHER | Age: 40
Discharge: HOME | End: 2020-06-09
Attending: OBSTETRICS & GYNECOLOGY
Payer: COMMERCIAL

## 2020-06-09 ENCOUNTER — LAB VISIT (OUTPATIENT)
Dept: LAB | Facility: OTHER | Age: 40
End: 2020-06-09
Attending: OBSTETRICS & GYNECOLOGY
Payer: COMMERCIAL

## 2020-06-09 DIAGNOSIS — E28.9 OVARIAN DYSFUNCTION: ICD-10-CM

## 2020-06-09 DIAGNOSIS — E28.9 DISORDER OF ENDOCRINE OVARY: Primary | ICD-10-CM

## 2020-06-09 LAB
ESTRADIOL SERPL-MCNC: 202 PG/ML
LH SERPL-ACNC: 3.7 MIU/ML
PROGEST SERPL-MCNC: 0.1 NG/ML

## 2020-06-09 PROCEDURE — 36415 COLL VENOUS BLD VENIPUNCTURE: CPT

## 2020-06-09 PROCEDURE — 84144 ASSAY OF PROGESTERONE: CPT

## 2020-06-09 PROCEDURE — 82670 ASSAY OF TOTAL ESTRADIOL: CPT

## 2020-06-09 PROCEDURE — 76856 US EXAM PELVIC COMPLETE: CPT | Mod: 26,,, | Performed by: RADIOLOGY

## 2020-06-09 PROCEDURE — 76830 US PELVIS COMP WITH TRANSVAG NON-OB (XPD): ICD-10-PCS | Mod: 26,,, | Performed by: RADIOLOGY

## 2020-06-09 PROCEDURE — 76856 US PELVIS COMP WITH TRANSVAG NON-OB (XPD): ICD-10-PCS | Mod: 26,,, | Performed by: RADIOLOGY

## 2020-06-09 PROCEDURE — 76830 TRANSVAGINAL US NON-OB: CPT | Mod: 26,,, | Performed by: RADIOLOGY

## 2020-06-09 PROCEDURE — 83002 ASSAY OF GONADOTROPIN (LH): CPT

## 2020-06-09 PROCEDURE — 76830 TRANSVAGINAL US NON-OB: CPT | Mod: TC

## 2020-06-24 ENCOUNTER — LAB VISIT (OUTPATIENT)
Dept: LAB | Facility: OTHER | Age: 40
End: 2020-06-24
Attending: OBSTETRICS & GYNECOLOGY
Payer: COMMERCIAL

## 2020-06-24 LAB
ESTRADIOL SERPL-MCNC: 123 PG/ML
PROGEST SERPL-MCNC: 20.6 NG/ML

## 2020-06-24 PROCEDURE — 36415 COLL VENOUS BLD VENIPUNCTURE: CPT

## 2020-06-24 PROCEDURE — 82670 ASSAY OF TOTAL ESTRADIOL: CPT

## 2020-06-24 PROCEDURE — 84144 ASSAY OF PROGESTERONE: CPT

## 2020-06-29 ENCOUNTER — LAB VISIT (OUTPATIENT)
Dept: LAB | Facility: OTHER | Age: 40
End: 2020-06-29
Attending: OBSTETRICS & GYNECOLOGY
Payer: COMMERCIAL

## 2020-06-29 DIAGNOSIS — Z32.00 PREGNANCY EXAMINATION OR TEST, PREGNANCY UNCONFIRMED: Primary | ICD-10-CM

## 2020-06-29 LAB
HCG INTACT+B SERPL-ACNC: <1.2 MIU/ML
PROGEST SERPL-MCNC: 52.8 NG/ML

## 2020-06-29 PROCEDURE — 84144 ASSAY OF PROGESTERONE: CPT

## 2020-06-29 PROCEDURE — 36415 COLL VENOUS BLD VENIPUNCTURE: CPT

## 2020-06-29 PROCEDURE — 84702 CHORIONIC GONADOTROPIN TEST: CPT

## 2020-07-06 ENCOUNTER — LAB VISIT (OUTPATIENT)
Dept: LAB | Facility: OTHER | Age: 40
End: 2020-07-06
Payer: COMMERCIAL

## 2020-07-06 DIAGNOSIS — E28.9 DYSFUNCTION OVARIAN: Primary | ICD-10-CM

## 2020-07-06 LAB
ESTRADIOL SERPL-MCNC: 57 PG/ML
FSH SERPL-ACNC: 5.9 MIU/ML
HCG INTACT+B SERPL-ACNC: <1.2 MIU/ML
LH SERPL-ACNC: 3.4 MIU/ML
PROGEST SERPL-MCNC: 1.4 NG/ML
TSH SERPL DL<=0.005 MIU/L-ACNC: 0.86 UIU/ML (ref 0.4–4)

## 2020-07-06 PROCEDURE — 83001 ASSAY OF GONADOTROPIN (FSH): CPT

## 2020-07-06 PROCEDURE — 84144 ASSAY OF PROGESTERONE: CPT

## 2020-07-06 PROCEDURE — 82670 ASSAY OF TOTAL ESTRADIOL: CPT

## 2020-07-06 PROCEDURE — 84702 CHORIONIC GONADOTROPIN TEST: CPT

## 2020-07-06 PROCEDURE — 36415 COLL VENOUS BLD VENIPUNCTURE: CPT

## 2020-07-06 PROCEDURE — 83002 ASSAY OF GONADOTROPIN (LH): CPT

## 2020-07-06 PROCEDURE — 84443 ASSAY THYROID STIM HORMONE: CPT

## 2020-07-13 ENCOUNTER — LAB VISIT (OUTPATIENT)
Dept: LAB | Facility: OTHER | Age: 40
End: 2020-07-13
Attending: OBSTETRICS & GYNECOLOGY
Payer: COMMERCIAL

## 2020-07-13 DIAGNOSIS — E28.9 DISORDER OF ENDOCRINE OVARY: Primary | ICD-10-CM

## 2020-07-13 LAB
ESTRADIOL SERPL-MCNC: 587 PG/ML
LH SERPL-ACNC: 0.6 MIU/ML
PROGEST SERPL-MCNC: 0.7 NG/ML

## 2020-07-13 PROCEDURE — 36415 COLL VENOUS BLD VENIPUNCTURE: CPT

## 2020-07-13 PROCEDURE — 82670 ASSAY OF TOTAL ESTRADIOL: CPT

## 2020-07-13 PROCEDURE — 83002 ASSAY OF GONADOTROPIN (LH): CPT

## 2020-07-13 PROCEDURE — 84144 ASSAY OF PROGESTERONE: CPT

## 2020-07-15 ENCOUNTER — LAB VISIT (OUTPATIENT)
Dept: LAB | Facility: OTHER | Age: 40
End: 2020-07-15
Attending: OBSTETRICS & GYNECOLOGY
Payer: COMMERCIAL

## 2020-07-15 DIAGNOSIS — E28.9 DISORDER OF ENDOCRINE OVARY: Primary | ICD-10-CM

## 2020-07-15 LAB
ESTRADIOL SERPL-MCNC: 1544 PG/ML
LH SERPL-ACNC: 0.8 MIU/ML
PROGEST SERPL-MCNC: 0.9 NG/ML

## 2020-07-15 PROCEDURE — 84144 ASSAY OF PROGESTERONE: CPT

## 2020-07-15 PROCEDURE — 82670 ASSAY OF TOTAL ESTRADIOL: CPT

## 2020-07-15 PROCEDURE — 36415 COLL VENOUS BLD VENIPUNCTURE: CPT

## 2020-07-15 PROCEDURE — 83002 ASSAY OF GONADOTROPIN (LH): CPT

## 2020-07-24 ENCOUNTER — LAB VISIT (OUTPATIENT)
Dept: LAB | Facility: OTHER | Age: 40
End: 2020-07-24
Payer: COMMERCIAL

## 2020-07-24 DIAGNOSIS — E28.9 DISORDER OF ENDOCRINE OVARY: Primary | ICD-10-CM

## 2020-07-24 LAB
ESTRADIOL SERPL-MCNC: 380 PG/ML
PROGEST SERPL-MCNC: 34.7 NG/ML

## 2020-07-24 PROCEDURE — 36415 COLL VENOUS BLD VENIPUNCTURE: CPT

## 2020-07-24 PROCEDURE — 82670 ASSAY OF TOTAL ESTRADIOL: CPT

## 2020-07-24 PROCEDURE — 84144 ASSAY OF PROGESTERONE: CPT

## 2020-07-31 ENCOUNTER — LAB VISIT (OUTPATIENT)
Dept: LAB | Facility: OTHER | Age: 40
End: 2020-07-31
Attending: OBSTETRICS & GYNECOLOGY
Payer: COMMERCIAL

## 2020-07-31 DIAGNOSIS — Z32.00 PREGNANCY EXAMINATION OR TEST, PREGNANCY UNCONFIRMED: Primary | ICD-10-CM

## 2020-07-31 LAB
HCG INTACT+B SERPL-ACNC: 29 MIU/ML
PROGEST SERPL-MCNC: 25.1 NG/ML

## 2020-07-31 PROCEDURE — 84144 ASSAY OF PROGESTERONE: CPT

## 2020-07-31 PROCEDURE — 36415 COLL VENOUS BLD VENIPUNCTURE: CPT

## 2020-07-31 PROCEDURE — 84702 CHORIONIC GONADOTROPIN TEST: CPT

## 2020-08-03 ENCOUNTER — LAB VISIT (OUTPATIENT)
Dept: LAB | Facility: OTHER | Age: 40
End: 2020-08-03
Attending: OBSTETRICS & GYNECOLOGY
Payer: COMMERCIAL

## 2020-08-03 DIAGNOSIS — Z32.01 PREGNANCY EXAMINATION OR TEST, POSITIVE RESULT: Primary | ICD-10-CM

## 2020-08-03 LAB
ESTRADIOL SERPL-MCNC: 136 PG/ML
HCG INTACT+B SERPL-ACNC: 86 MIU/ML
PROGEST SERPL-MCNC: 56 NG/ML
TSH SERPL DL<=0.005 MIU/L-ACNC: 0.87 UIU/ML (ref 0.4–4)

## 2020-08-03 PROCEDURE — 84443 ASSAY THYROID STIM HORMONE: CPT

## 2020-08-03 PROCEDURE — 82670 ASSAY OF TOTAL ESTRADIOL: CPT

## 2020-08-03 PROCEDURE — 84144 ASSAY OF PROGESTERONE: CPT

## 2020-08-03 PROCEDURE — 36415 COLL VENOUS BLD VENIPUNCTURE: CPT

## 2020-08-03 PROCEDURE — 84702 CHORIONIC GONADOTROPIN TEST: CPT

## 2020-08-05 ENCOUNTER — LAB VISIT (OUTPATIENT)
Dept: LAB | Facility: OTHER | Age: 40
End: 2020-08-05
Payer: COMMERCIAL

## 2020-08-05 DIAGNOSIS — Z32.01 PREGNANCY EXAMINATION OR TEST, POSITIVE RESULT: Primary | ICD-10-CM

## 2020-08-05 LAB
ESTRADIOL SERPL-MCNC: 166 PG/ML
HCG INTACT+B SERPL-ACNC: 221 MIU/ML

## 2020-08-05 PROCEDURE — 36415 COLL VENOUS BLD VENIPUNCTURE: CPT

## 2020-08-05 PROCEDURE — 82670 ASSAY OF TOTAL ESTRADIOL: CPT

## 2020-08-05 PROCEDURE — 84144 ASSAY OF PROGESTERONE: CPT

## 2020-08-05 PROCEDURE — 84702 CHORIONIC GONADOTROPIN TEST: CPT

## 2020-08-06 LAB — PROGEST SERPL-MCNC: 54 NG/ML

## 2020-09-17 ENCOUNTER — LAB VISIT (OUTPATIENT)
Dept: LAB | Facility: OTHER | Age: 40
End: 2020-09-17
Attending: OBSTETRICS & GYNECOLOGY
Payer: COMMERCIAL

## 2020-09-17 DIAGNOSIS — E28.9 DISORDER OF ENDOCRINE OVARY: Primary | ICD-10-CM

## 2020-09-17 LAB
ALBUMIN SERPL BCP-MCNC: 3.4 G/DL (ref 3.5–5.2)
ALP SERPL-CCNC: 61 U/L (ref 55–135)
ALT SERPL W/O P-5'-P-CCNC: 17 U/L (ref 10–44)
ANION GAP SERPL CALC-SCNC: 7 MMOL/L (ref 8–16)
AST SERPL-CCNC: 15 U/L (ref 10–40)
BILIRUB SERPL-MCNC: 0.4 MG/DL (ref 0.1–1)
BUN SERPL-MCNC: 11 MG/DL (ref 6–20)
CALCIUM SERPL-MCNC: 8.7 MG/DL (ref 8.7–10.5)
CHLORIDE SERPL-SCNC: 105 MMOL/L (ref 95–110)
CO2 SERPL-SCNC: 26 MMOL/L (ref 23–29)
CREAT SERPL-MCNC: 0.7 MG/DL (ref 0.5–1.4)
ERYTHROCYTE [DISTWIDTH] IN BLOOD BY AUTOMATED COUNT: 14.8 % (ref 11.5–14.5)
EST. GFR  (AFRICAN AMERICAN): >60 ML/MIN/1.73 M^2
EST. GFR  (NON AFRICAN AMERICAN): >60 ML/MIN/1.73 M^2
GLUCOSE SERPL-MCNC: 94 MG/DL (ref 70–110)
HCG INTACT+B SERPL-ACNC: <1.2 MIU/ML
HCT VFR BLD AUTO: 37 % (ref 37–48.5)
HGB BLD-MCNC: 11 G/DL (ref 12–16)
MCH RBC QN AUTO: 23.6 PG (ref 27–31)
MCHC RBC AUTO-ENTMCNC: 29.7 G/DL (ref 32–36)
MCV RBC AUTO: 79 FL (ref 82–98)
PLATELET # BLD AUTO: 260 K/UL (ref 150–350)
PMV BLD AUTO: 11.2 FL (ref 9.2–12.9)
POTASSIUM SERPL-SCNC: 4.3 MMOL/L (ref 3.5–5.1)
PROT SERPL-MCNC: 6.3 G/DL (ref 6–8.4)
RBC # BLD AUTO: 4.67 M/UL (ref 4–5.4)
SODIUM SERPL-SCNC: 138 MMOL/L (ref 136–145)
WBC # BLD AUTO: 7.02 K/UL (ref 3.9–12.7)

## 2020-09-17 PROCEDURE — 85027 COMPLETE CBC AUTOMATED: CPT

## 2020-09-17 PROCEDURE — 84702 CHORIONIC GONADOTROPIN TEST: CPT

## 2020-09-17 PROCEDURE — 80053 COMPREHEN METABOLIC PANEL: CPT

## 2020-11-02 ENCOUNTER — LAB VISIT (OUTPATIENT)
Dept: LAB | Facility: OTHER | Age: 40
End: 2020-11-02
Attending: OBSTETRICS & GYNECOLOGY
Payer: COMMERCIAL

## 2020-11-02 DIAGNOSIS — Z31.41 FERTILITY TESTING: Primary | ICD-10-CM

## 2020-11-02 LAB
ALBUMIN SERPL BCP-MCNC: 3.4 G/DL (ref 3.5–5.2)
ALP SERPL-CCNC: 82 U/L (ref 55–135)
ALT SERPL W/O P-5'-P-CCNC: 18 U/L (ref 10–44)
ANION GAP SERPL CALC-SCNC: 6 MMOL/L (ref 8–16)
AST SERPL-CCNC: 15 U/L (ref 10–40)
BASOPHILS # BLD AUTO: 0.05 K/UL (ref 0–0.2)
BASOPHILS NFR BLD: 0.7 % (ref 0–1.9)
BILIRUB SERPL-MCNC: 0.3 MG/DL (ref 0.1–1)
BUN SERPL-MCNC: 13 MG/DL (ref 6–20)
CALCIUM SERPL-MCNC: 8.3 MG/DL (ref 8.7–10.5)
CHLORIDE SERPL-SCNC: 107 MMOL/L (ref 95–110)
CO2 SERPL-SCNC: 26 MMOL/L (ref 23–29)
CREAT SERPL-MCNC: 0.7 MG/DL (ref 0.5–1.4)
DIFFERENTIAL METHOD: ABNORMAL
EOSINOPHIL # BLD AUTO: 0.2 K/UL (ref 0–0.5)
EOSINOPHIL NFR BLD: 3.1 % (ref 0–8)
ERYTHROCYTE [DISTWIDTH] IN BLOOD BY AUTOMATED COUNT: 13.8 % (ref 11.5–14.5)
EST. GFR  (AFRICAN AMERICAN): >60 ML/MIN/1.73 M^2
EST. GFR  (NON AFRICAN AMERICAN): >60 ML/MIN/1.73 M^2
GLUCOSE SERPL-MCNC: 100 MG/DL (ref 70–110)
HCG INTACT+B SERPL-ACNC: <1.2 MIU/ML
HCT VFR BLD AUTO: 37.4 % (ref 37–48.5)
HGB BLD-MCNC: 11 G/DL (ref 12–16)
IMM GRANULOCYTES # BLD AUTO: 0.02 K/UL (ref 0–0.04)
IMM GRANULOCYTES NFR BLD AUTO: 0.3 % (ref 0–0.5)
LYMPHOCYTES # BLD AUTO: 1.3 K/UL (ref 1–4.8)
LYMPHOCYTES NFR BLD: 20 % (ref 18–48)
MCH RBC QN AUTO: 23.2 PG (ref 27–31)
MCHC RBC AUTO-ENTMCNC: 29.4 G/DL (ref 32–36)
MCV RBC AUTO: 79 FL (ref 82–98)
MONOCYTES # BLD AUTO: 0.6 K/UL (ref 0.3–1)
MONOCYTES NFR BLD: 8.2 % (ref 4–15)
NEUTROPHILS # BLD AUTO: 4.5 K/UL (ref 1.8–7.7)
NEUTROPHILS NFR BLD: 67.7 % (ref 38–73)
NRBC BLD-RTO: 0 /100 WBC
PLATELET # BLD AUTO: 266 K/UL (ref 150–350)
PMV BLD AUTO: 11.9 FL (ref 9.2–12.9)
POTASSIUM SERPL-SCNC: 4 MMOL/L (ref 3.5–5.1)
PROT SERPL-MCNC: 6.5 G/DL (ref 6–8.4)
RBC # BLD AUTO: 4.75 M/UL (ref 4–5.4)
SODIUM SERPL-SCNC: 139 MMOL/L (ref 136–145)
WBC # BLD AUTO: 6.71 K/UL (ref 3.9–12.7)

## 2020-11-02 PROCEDURE — 85025 COMPLETE CBC W/AUTO DIFF WBC: CPT

## 2020-11-02 PROCEDURE — 36415 COLL VENOUS BLD VENIPUNCTURE: CPT

## 2020-11-02 PROCEDURE — 84702 CHORIONIC GONADOTROPIN TEST: CPT

## 2020-11-02 PROCEDURE — 80053 COMPREHEN METABOLIC PANEL: CPT

## 2021-02-26 ENCOUNTER — IMMUNIZATION (OUTPATIENT)
Dept: PHARMACY | Facility: CLINIC | Age: 41
End: 2021-02-26
Payer: COMMERCIAL

## 2021-02-26 DIAGNOSIS — Z23 NEED FOR VACCINATION: Primary | ICD-10-CM

## 2021-03-10 ENCOUNTER — OFFICE VISIT (OUTPATIENT)
Dept: OPHTHALMOLOGY | Facility: CLINIC | Age: 41
End: 2021-03-10
Payer: COMMERCIAL

## 2021-03-10 DIAGNOSIS — H40.043 STEROID RESPONDERS, BILATERAL: Primary | ICD-10-CM

## 2021-03-10 DIAGNOSIS — H25.12 NUCLEAR SCLEROSIS OF LEFT EYE: ICD-10-CM

## 2021-03-10 DIAGNOSIS — H18.603 KERATOCONUS, BILATERAL: ICD-10-CM

## 2021-03-10 DIAGNOSIS — Z94.7 STATUS POST CORNEAL TRANSPLANT: ICD-10-CM

## 2021-03-10 PROCEDURE — 1126F AMNT PAIN NOTED NONE PRSNT: CPT | Mod: S$GLB,,, | Performed by: OPHTHALMOLOGY

## 2021-03-10 PROCEDURE — 99999 PR PBB SHADOW E&M-EST. PATIENT-LVL III: ICD-10-PCS | Mod: PBBFAC,,, | Performed by: OPHTHALMOLOGY

## 2021-03-10 PROCEDURE — 1126F PR PAIN SEVERITY QUANTIFIED, NO PAIN PRESENT: ICD-10-PCS | Mod: S$GLB,,, | Performed by: OPHTHALMOLOGY

## 2021-03-10 PROCEDURE — 92014 COMPRE OPH EXAM EST PT 1/>: CPT | Mod: S$GLB,,, | Performed by: OPHTHALMOLOGY

## 2021-03-10 PROCEDURE — 92014 PR EYE EXAM, EST PATIENT,COMPREHESV: ICD-10-PCS | Mod: S$GLB,,, | Performed by: OPHTHALMOLOGY

## 2021-03-10 PROCEDURE — 99999 PR PBB SHADOW E&M-EST. PATIENT-LVL III: CPT | Mod: PBBFAC,,, | Performed by: OPHTHALMOLOGY

## 2021-03-10 RX ORDER — DORZOLAMIDE HYDROCHLORIDE AND TIMOLOL MALEATE 20; 5 MG/ML; MG/ML
1 SOLUTION/ DROPS OPHTHALMIC 2 TIMES DAILY
Qty: 10 ML | Refills: 3 | Status: SHIPPED | OUTPATIENT
Start: 2021-03-10 | End: 2022-08-02 | Stop reason: SDUPTHER

## 2021-03-10 RX ORDER — BRIMONIDINE TARTRATE 2 MG/ML
1 SOLUTION/ DROPS OPHTHALMIC 3 TIMES DAILY
Qty: 10 ML | Refills: 3 | Status: SHIPPED | OUTPATIENT
Start: 2021-03-10 | End: 2022-08-02 | Stop reason: SDUPTHER

## 2021-03-10 RX ORDER — FLUOROMETHOLONE 1 MG/ML
1 SUSPENSION/ DROPS OPHTHALMIC 2 TIMES DAILY
Qty: 10 ML | Refills: 3 | Status: SHIPPED | OUTPATIENT
Start: 2021-03-10 | End: 2021-03-20

## 2021-03-10 RX ORDER — LATANOPROST 50 UG/ML
1 SOLUTION/ DROPS OPHTHALMIC DAILY
Qty: 2.5 ML | Refills: 1 | Status: SHIPPED | OUTPATIENT
Start: 2021-03-10 | End: 2022-02-01

## 2021-03-26 ENCOUNTER — IMMUNIZATION (OUTPATIENT)
Dept: PHARMACY | Facility: CLINIC | Age: 41
End: 2021-03-26
Payer: COMMERCIAL

## 2021-03-26 DIAGNOSIS — Z23 NEED FOR VACCINATION: Primary | ICD-10-CM

## 2021-04-29 ENCOUNTER — LAB VISIT (OUTPATIENT)
Dept: LAB | Facility: HOSPITAL | Age: 41
End: 2021-04-29
Attending: OBSTETRICS & GYNECOLOGY
Payer: COMMERCIAL

## 2021-04-29 DIAGNOSIS — E28.9 DYSFUNCTION OVARIAN: Primary | ICD-10-CM

## 2021-04-29 LAB
ESTRADIOL SERPL-MCNC: 188 PG/ML
LH SERPL-ACNC: 5.8 MIU/ML
PROGEST SERPL-MCNC: 0.1 NG/ML

## 2021-04-29 PROCEDURE — 83002 ASSAY OF GONADOTROPIN (LH): CPT | Performed by: OBSTETRICS & GYNECOLOGY

## 2021-04-29 PROCEDURE — 82670 ASSAY OF TOTAL ESTRADIOL: CPT | Performed by: OBSTETRICS & GYNECOLOGY

## 2021-04-29 PROCEDURE — 84144 ASSAY OF PROGESTERONE: CPT | Performed by: OBSTETRICS & GYNECOLOGY

## 2021-04-29 PROCEDURE — 36415 COLL VENOUS BLD VENIPUNCTURE: CPT | Performed by: OBSTETRICS & GYNECOLOGY

## 2021-05-12 ENCOUNTER — LAB VISIT (OUTPATIENT)
Dept: LAB | Facility: HOSPITAL | Age: 41
End: 2021-05-12
Attending: OBSTETRICS & GYNECOLOGY
Payer: COMMERCIAL

## 2021-05-12 DIAGNOSIS — Z31.49 OTHER INVESTIGATION AND TESTING FOR PROCREATIVE MANAGEMENT: Primary | ICD-10-CM

## 2021-05-12 LAB
ESTRADIOL SERPL-MCNC: 121 PG/ML
PROGEST SERPL-MCNC: 31.4 NG/ML

## 2021-05-12 PROCEDURE — 36415 COLL VENOUS BLD VENIPUNCTURE: CPT | Performed by: OBSTETRICS & GYNECOLOGY

## 2021-05-12 PROCEDURE — 82670 ASSAY OF TOTAL ESTRADIOL: CPT | Performed by: OBSTETRICS & GYNECOLOGY

## 2021-05-12 PROCEDURE — 84144 ASSAY OF PROGESTERONE: CPT | Performed by: OBSTETRICS & GYNECOLOGY

## 2021-05-17 ENCOUNTER — LAB VISIT (OUTPATIENT)
Dept: LAB | Facility: HOSPITAL | Age: 41
End: 2021-05-17
Attending: OBSTETRICS & GYNECOLOGY
Payer: COMMERCIAL

## 2021-05-17 DIAGNOSIS — Z32.00 PREGNANCY EXAMINATION OR TEST, PREGNANCY UNCONFIRMED: Primary | ICD-10-CM

## 2021-05-17 LAB
HCG INTACT+B SERPL-ACNC: 2.8 MIU/ML
PROGEST SERPL-MCNC: 21.6 NG/ML

## 2021-05-17 PROCEDURE — 84144 ASSAY OF PROGESTERONE: CPT | Performed by: OBSTETRICS & GYNECOLOGY

## 2021-05-17 PROCEDURE — 36415 COLL VENOUS BLD VENIPUNCTURE: CPT | Performed by: OBSTETRICS & GYNECOLOGY

## 2021-05-17 PROCEDURE — 84702 CHORIONIC GONADOTROPIN TEST: CPT | Performed by: OBSTETRICS & GYNECOLOGY

## 2021-05-25 ENCOUNTER — LAB VISIT (OUTPATIENT)
Dept: LAB | Facility: HOSPITAL | Age: 41
End: 2021-05-25
Attending: OBSTETRICS & GYNECOLOGY
Payer: COMMERCIAL

## 2021-05-25 DIAGNOSIS — E28.9 OVARIAN DYSFUNCTION: Primary | ICD-10-CM

## 2021-05-25 LAB
B-OH-BUTYR BLD STRIP-SCNC: 0.2 MMOL/L (ref 0–0.5)
ESTRADIOL SERPL-MCNC: 44 PG/ML
FSH SERPL-ACNC: 4.6 MIU/ML
LH SERPL-ACNC: 4.1 MIU/ML
PROGEST SERPL-MCNC: 1.8 NG/ML
TSH SERPL DL<=0.005 MIU/L-ACNC: 0.78 UIU/ML (ref 0.4–4)

## 2021-05-25 PROCEDURE — 84443 ASSAY THYROID STIM HORMONE: CPT | Performed by: OBSTETRICS & GYNECOLOGY

## 2021-05-25 PROCEDURE — 82670 ASSAY OF TOTAL ESTRADIOL: CPT | Performed by: OBSTETRICS & GYNECOLOGY

## 2021-05-25 PROCEDURE — 83001 ASSAY OF GONADOTROPIN (FSH): CPT | Performed by: OBSTETRICS & GYNECOLOGY

## 2021-05-25 PROCEDURE — 82010 KETONE BODYS QUAN: CPT | Performed by: OBSTETRICS & GYNECOLOGY

## 2021-05-25 PROCEDURE — 84144 ASSAY OF PROGESTERONE: CPT | Performed by: OBSTETRICS & GYNECOLOGY

## 2021-05-25 PROCEDURE — 83002 ASSAY OF GONADOTROPIN (LH): CPT | Performed by: OBSTETRICS & GYNECOLOGY

## 2021-05-25 PROCEDURE — 36415 COLL VENOUS BLD VENIPUNCTURE: CPT | Performed by: OBSTETRICS & GYNECOLOGY

## 2021-05-26 ENCOUNTER — OFFICE VISIT (OUTPATIENT)
Dept: OPHTHALMOLOGY | Facility: CLINIC | Age: 41
End: 2021-05-26
Payer: COMMERCIAL

## 2021-05-26 DIAGNOSIS — H18.603 KERATOCONUS OF BOTH EYES: ICD-10-CM

## 2021-05-26 DIAGNOSIS — H40.32X3 GLAUCOMA OF LEFT EYE ASSOCIATED WITH OCULAR TRAUMA, SEVERE STAGE: Primary | ICD-10-CM

## 2021-05-26 DIAGNOSIS — H40.31X2 GLAUCOMA OF RIGHT EYE ASSOCIATED WITH OCULAR TRAUMA, MODERATE STAGE: ICD-10-CM

## 2021-05-26 DIAGNOSIS — H25.13 NUCLEAR SCLEROTIC CATARACT OF BOTH EYES: ICD-10-CM

## 2021-05-26 DIAGNOSIS — H40.043 STEROID RESPONDER, BILATERAL: ICD-10-CM

## 2021-05-26 DIAGNOSIS — Z94.7 HISTORY OF CORNEAL TRANSPLANT: ICD-10-CM

## 2021-05-26 DIAGNOSIS — H25.042 POSTERIOR SUBCAPSULAR AGE-RELATED CATARACT OF LEFT EYE: ICD-10-CM

## 2021-05-26 PROCEDURE — 99214 OFFICE O/P EST MOD 30 MIN: CPT | Mod: S$GLB,,, | Performed by: OPHTHALMOLOGY

## 2021-05-26 PROCEDURE — 92020 GONIOSCOPY: CPT | Mod: S$GLB,,, | Performed by: OPHTHALMOLOGY

## 2021-05-26 PROCEDURE — 1126F PR PAIN SEVERITY QUANTIFIED, NO PAIN PRESENT: ICD-10-PCS | Mod: S$GLB,,, | Performed by: OPHTHALMOLOGY

## 2021-05-26 PROCEDURE — 92020 PR SPECIAL EYE EVAL,GONIOSCOPY: ICD-10-PCS | Mod: S$GLB,,, | Performed by: OPHTHALMOLOGY

## 2021-05-26 PROCEDURE — 76514 PR  US, EYE, FOR CORNEAL THICKNESS: ICD-10-PCS | Mod: S$GLB,,, | Performed by: OPHTHALMOLOGY

## 2021-05-26 PROCEDURE — 99999 PR PBB SHADOW E&M-EST. PATIENT-LVL III: ICD-10-PCS | Mod: PBBFAC,,, | Performed by: OPHTHALMOLOGY

## 2021-05-26 PROCEDURE — 1126F AMNT PAIN NOTED NONE PRSNT: CPT | Mod: S$GLB,,, | Performed by: OPHTHALMOLOGY

## 2021-05-26 PROCEDURE — 99999 PR PBB SHADOW E&M-EST. PATIENT-LVL III: CPT | Mod: PBBFAC,,, | Performed by: OPHTHALMOLOGY

## 2021-05-26 PROCEDURE — 76514 ECHO EXAM OF EYE THICKNESS: CPT | Mod: S$GLB,,, | Performed by: OPHTHALMOLOGY

## 2021-05-26 PROCEDURE — 99214 PR OFFICE/OUTPT VISIT, EST, LEVL IV, 30-39 MIN: ICD-10-PCS | Mod: S$GLB,,, | Performed by: OPHTHALMOLOGY

## 2021-05-26 RX ORDER — VITAMIN E (DL,TOCOPHERYL ACET) 90 MG
CAPSULE ORAL
Status: ON HOLD | COMMUNITY
Start: 2021-01-18 | End: 2021-08-05 | Stop reason: ALTCHOICE

## 2021-05-26 RX ORDER — DEXTROAMPHETAMINE SACCHARATE, AMPHETAMINE ASPARTATE, DEXTROAMPHETAMINE SULFATE AND AMPHETAMINE SULFATE 7.5; 7.5; 7.5; 7.5 MG/1; MG/1; MG/1; MG/1
TABLET ORAL
COMMUNITY
Start: 2021-05-04 | End: 2022-10-12

## 2021-05-27 PROBLEM — H40.32X3 GLAUCOMA OF LEFT EYE ASSOCIATED WITH OCULAR TRAUMA, SEVERE STAGE: Status: ACTIVE | Noted: 2021-05-27

## 2021-05-27 PROBLEM — H25.13 NUCLEAR SCLEROTIC CATARACT OF BOTH EYES: Status: ACTIVE | Noted: 2021-05-27

## 2021-05-27 PROBLEM — H40.043 STEROID RESPONDER, BILATERAL: Status: ACTIVE | Noted: 2021-05-27

## 2021-05-27 PROBLEM — H40.31X2 GLAUCOMA OF RIGHT EYE ASSOCIATED WITH OCULAR TRAUMA, MODERATE STAGE: Status: ACTIVE | Noted: 2021-05-27

## 2021-05-27 PROBLEM — H25.042 POSTERIOR SUBCAPSULAR AGE-RELATED CATARACT OF LEFT EYE: Status: ACTIVE | Noted: 2021-05-27

## 2021-05-27 PROBLEM — H18.603 KERATOCONUS OF BOTH EYES: Status: ACTIVE | Noted: 2021-05-27

## 2021-05-31 RX ORDER — LATANOPROST 50 UG/ML
1 SOLUTION/ DROPS OPHTHALMIC DAILY
Qty: 2.5 ML | Refills: 1 | Status: CANCELLED | OUTPATIENT
Start: 2021-05-31 | End: 2022-05-31

## 2021-06-01 ENCOUNTER — LAB VISIT (OUTPATIENT)
Dept: LAB | Facility: HOSPITAL | Age: 41
End: 2021-06-01
Payer: COMMERCIAL

## 2021-06-01 DIAGNOSIS — E28.9 OVARIAN DYSFUNCTION: Primary | ICD-10-CM

## 2021-06-01 PROCEDURE — 36415 COLL VENOUS BLD VENIPUNCTURE: CPT

## 2021-06-01 PROCEDURE — 82670 ASSAY OF TOTAL ESTRADIOL: CPT

## 2021-06-01 PROCEDURE — 84144 ASSAY OF PROGESTERONE: CPT

## 2021-06-01 PROCEDURE — 83002 ASSAY OF GONADOTROPIN (LH): CPT

## 2021-06-02 LAB
ESTRADIOL SERPL-MCNC: 280 PG/ML
LH SERPL-ACNC: 5.1 MIU/ML
PROGEST SERPL-MCNC: 0.7 NG/ML

## 2021-06-14 ENCOUNTER — LAB VISIT (OUTPATIENT)
Dept: LAB | Facility: HOSPITAL | Age: 41
End: 2021-06-14
Attending: OBSTETRICS & GYNECOLOGY
Payer: COMMERCIAL

## 2021-06-14 DIAGNOSIS — E28.9 DYSFUNCTION OVARIAN: Primary | ICD-10-CM

## 2021-06-14 LAB
ESTRADIOL SERPL-MCNC: 112 PG/ML
PROGEST SERPL-MCNC: 64.7 NG/ML

## 2021-06-14 PROCEDURE — 82670 ASSAY OF TOTAL ESTRADIOL: CPT | Performed by: OBSTETRICS & GYNECOLOGY

## 2021-06-14 PROCEDURE — 36415 COLL VENOUS BLD VENIPUNCTURE: CPT | Performed by: OBSTETRICS & GYNECOLOGY

## 2021-06-14 PROCEDURE — 84144 ASSAY OF PROGESTERONE: CPT | Performed by: OBSTETRICS & GYNECOLOGY

## 2021-06-18 ENCOUNTER — LAB VISIT (OUTPATIENT)
Dept: LAB | Facility: HOSPITAL | Age: 41
End: 2021-06-18
Payer: COMMERCIAL

## 2021-06-18 DIAGNOSIS — Z32.00 UNCONFIRMED PREGNANCY: ICD-10-CM

## 2021-06-18 LAB — HCG INTACT+B SERPL-ACNC: <1.2 MIU/ML

## 2021-06-18 PROCEDURE — 84144 ASSAY OF PROGESTERONE: CPT

## 2021-06-18 PROCEDURE — 84702 CHORIONIC GONADOTROPIN TEST: CPT

## 2021-06-18 PROCEDURE — 36415 COLL VENOUS BLD VENIPUNCTURE: CPT

## 2021-06-19 LAB — PROGEST SERPL-MCNC: 19.8 NG/ML

## 2021-06-24 ENCOUNTER — LAB VISIT (OUTPATIENT)
Dept: LAB | Facility: HOSPITAL | Age: 41
End: 2021-06-24
Payer: COMMERCIAL

## 2021-06-24 DIAGNOSIS — E28.9 DYSFUNCTION OVARIAN: Primary | ICD-10-CM

## 2021-06-24 LAB
ESTRADIOL SERPL-MCNC: 39 PG/ML
FSH SERPL-ACNC: 5.5 MIU/ML
LH SERPL-ACNC: 3.3 MIU/ML
PROGEST SERPL-MCNC: 2.3 NG/ML
TSH SERPL DL<=0.005 MIU/L-ACNC: 1.48 UIU/ML (ref 0.4–4)

## 2021-06-24 PROCEDURE — 84443 ASSAY THYROID STIM HORMONE: CPT | Performed by: OBSTETRICS & GYNECOLOGY

## 2021-06-24 PROCEDURE — 36415 COLL VENOUS BLD VENIPUNCTURE: CPT | Performed by: OBSTETRICS & GYNECOLOGY

## 2021-06-24 PROCEDURE — 83001 ASSAY OF GONADOTROPIN (FSH): CPT | Performed by: OBSTETRICS & GYNECOLOGY

## 2021-06-24 PROCEDURE — 82670 ASSAY OF TOTAL ESTRADIOL: CPT | Performed by: OBSTETRICS & GYNECOLOGY

## 2021-06-24 PROCEDURE — 84144 ASSAY OF PROGESTERONE: CPT | Performed by: OBSTETRICS & GYNECOLOGY

## 2021-06-24 PROCEDURE — 83002 ASSAY OF GONADOTROPIN (LH): CPT | Performed by: OBSTETRICS & GYNECOLOGY

## 2021-06-30 ENCOUNTER — OFFICE VISIT (OUTPATIENT)
Dept: OPHTHALMOLOGY | Facility: CLINIC | Age: 41
End: 2021-06-30
Payer: COMMERCIAL

## 2021-06-30 DIAGNOSIS — H26.8 MATURE CATARACT: Primary | ICD-10-CM

## 2021-06-30 DIAGNOSIS — T86.8409 CORNEAL GRAFT REJECTION: ICD-10-CM

## 2021-06-30 DIAGNOSIS — H18.603 KERATOCONUS OF BOTH EYES: ICD-10-CM

## 2021-06-30 DIAGNOSIS — H40.043 STEROID RESPONDER, BILATERAL: ICD-10-CM

## 2021-06-30 PROCEDURE — 92136 OPHTHALMIC BIOMETRY: CPT | Mod: LT,S$GLB,, | Performed by: OPHTHALMOLOGY

## 2021-06-30 PROCEDURE — 92136 IOL MASTER - OU - BOTH EYES: ICD-10-PCS | Mod: LT,S$GLB,, | Performed by: OPHTHALMOLOGY

## 2021-06-30 PROCEDURE — 92014 COMPRE OPH EXAM EST PT 1/>: CPT | Mod: S$GLB,,, | Performed by: OPHTHALMOLOGY

## 2021-06-30 PROCEDURE — 99999 PR PBB SHADOW E&M-EST. PATIENT-LVL III: CPT | Mod: PBBFAC,,, | Performed by: OPHTHALMOLOGY

## 2021-06-30 PROCEDURE — 92014 PR EYE EXAM, EST PATIENT,COMPREHESV: ICD-10-PCS | Mod: S$GLB,,, | Performed by: OPHTHALMOLOGY

## 2021-06-30 PROCEDURE — 99999 PR PBB SHADOW E&M-EST. PATIENT-LVL III: ICD-10-PCS | Mod: PBBFAC,,, | Performed by: OPHTHALMOLOGY

## 2021-06-30 PROCEDURE — 1126F PR PAIN SEVERITY QUANTIFIED, NO PAIN PRESENT: ICD-10-PCS | Mod: S$GLB,,, | Performed by: OPHTHALMOLOGY

## 2021-06-30 PROCEDURE — 1126F AMNT PAIN NOTED NONE PRSNT: CPT | Mod: S$GLB,,, | Performed by: OPHTHALMOLOGY

## 2021-06-30 RX ORDER — TETRACAINE HYDROCHLORIDE 5 MG/ML
1 SOLUTION OPHTHALMIC
Status: CANCELLED | OUTPATIENT
Start: 2021-06-30

## 2021-06-30 RX ORDER — PREDNISOLONE ACETATE-GATIFLOXACIN-BROMFENAC .75; 5; 1 MG/ML; MG/ML; MG/ML
1 SUSPENSION/ DROPS OPHTHALMIC 3 TIMES DAILY
Qty: 5 ML | Refills: 3 | Status: SHIPPED | OUTPATIENT
Start: 2021-06-30 | End: 2022-02-01

## 2021-06-30 RX ORDER — PHENYLEPHRINE HYDROCHLORIDE 25 MG/ML
1 SOLUTION/ DROPS OPHTHALMIC
Status: CANCELLED | OUTPATIENT
Start: 2021-06-30

## 2021-06-30 RX ORDER — MOXIFLOXACIN 5 MG/ML
1 SOLUTION/ DROPS OPHTHALMIC
Status: CANCELLED | OUTPATIENT
Start: 2021-06-30

## 2021-06-30 RX ORDER — TROPICAMIDE 10 MG/ML
1 SOLUTION/ DROPS OPHTHALMIC
Status: CANCELLED | OUTPATIENT
Start: 2021-06-30

## 2021-06-30 RX ORDER — SODIUM CHLORIDE 0.9 % (FLUSH) 0.9 %
10 SYRINGE (ML) INJECTION
Status: DISCONTINUED | OUTPATIENT
Start: 2021-06-30 | End: 2021-08-05 | Stop reason: ALTCHOICE

## 2021-07-02 ENCOUNTER — LAB VISIT (OUTPATIENT)
Dept: LAB | Facility: HOSPITAL | Age: 41
End: 2021-07-02
Attending: OBSTETRICS & GYNECOLOGY
Payer: COMMERCIAL

## 2021-07-02 DIAGNOSIS — E28.9 DYSFUNCTION OVARIAN: Primary | ICD-10-CM

## 2021-07-02 LAB
ESTRADIOL SERPL-MCNC: 685 PG/ML
LH SERPL-ACNC: 0.7 MIU/ML
PROGEST SERPL-MCNC: 0.8 NG/ML

## 2021-07-02 PROCEDURE — 84144 ASSAY OF PROGESTERONE: CPT | Performed by: OBSTETRICS & GYNECOLOGY

## 2021-07-02 PROCEDURE — 83002 ASSAY OF GONADOTROPIN (LH): CPT | Performed by: OBSTETRICS & GYNECOLOGY

## 2021-07-02 PROCEDURE — 82670 ASSAY OF TOTAL ESTRADIOL: CPT | Performed by: OBSTETRICS & GYNECOLOGY

## 2021-07-02 PROCEDURE — 36415 COLL VENOUS BLD VENIPUNCTURE: CPT | Performed by: OBSTETRICS & GYNECOLOGY

## 2021-07-19 ENCOUNTER — LAB VISIT (OUTPATIENT)
Dept: LAB | Facility: HOSPITAL | Age: 41
End: 2021-07-19
Payer: COMMERCIAL

## 2021-07-19 DIAGNOSIS — E28.9 DYSFUNCTION OVARIAN: Primary | ICD-10-CM

## 2021-07-19 LAB
ESTRADIOL SERPL-MCNC: 56 PG/ML
FSH SERPL-ACNC: 4.5 MIU/ML
HCG INTACT+B SERPL-ACNC: <1.2 MIU/ML
LH SERPL-ACNC: 3.8 MIU/ML
PROGEST SERPL-MCNC: 0.5 NG/ML
TSH SERPL DL<=0.005 MIU/L-ACNC: 2.51 UIU/ML (ref 0.4–4)

## 2021-07-19 PROCEDURE — 84702 CHORIONIC GONADOTROPIN TEST: CPT

## 2021-07-19 PROCEDURE — 84144 ASSAY OF PROGESTERONE: CPT

## 2021-07-19 PROCEDURE — 84443 ASSAY THYROID STIM HORMONE: CPT

## 2021-07-19 PROCEDURE — 36415 COLL VENOUS BLD VENIPUNCTURE: CPT

## 2021-07-19 PROCEDURE — 83001 ASSAY OF GONADOTROPIN (FSH): CPT

## 2021-07-19 PROCEDURE — 83002 ASSAY OF GONADOTROPIN (LH): CPT

## 2021-07-19 PROCEDURE — 82670 ASSAY OF TOTAL ESTRADIOL: CPT

## 2021-07-22 ENCOUNTER — TELEPHONE (OUTPATIENT)
Dept: OPHTHALMOLOGY | Facility: CLINIC | Age: 41
End: 2021-07-22

## 2021-07-22 DIAGNOSIS — H25.12 NUCLEAR SCLEROTIC CATARACT OF LEFT EYE: Primary | ICD-10-CM

## 2021-07-28 ENCOUNTER — LAB VISIT (OUTPATIENT)
Dept: LAB | Facility: HOSPITAL | Age: 41
End: 2021-07-28
Payer: COMMERCIAL

## 2021-07-28 DIAGNOSIS — E28.9 OVARIAN DYSFUNCTION: Primary | ICD-10-CM

## 2021-07-28 LAB
ESTRADIOL SERPL-MCNC: 209 PG/ML
LH SERPL-ACNC: 7.6 MIU/ML
PROGEST SERPL-MCNC: 0.4 NG/ML

## 2021-07-28 PROCEDURE — 84144 ASSAY OF PROGESTERONE: CPT | Performed by: OBSTETRICS & GYNECOLOGY

## 2021-07-28 PROCEDURE — 36415 COLL VENOUS BLD VENIPUNCTURE: CPT | Performed by: OBSTETRICS & GYNECOLOGY

## 2021-07-28 PROCEDURE — 83002 ASSAY OF GONADOTROPIN (LH): CPT | Performed by: OBSTETRICS & GYNECOLOGY

## 2021-07-28 PROCEDURE — 82670 ASSAY OF TOTAL ESTRADIOL: CPT | Performed by: OBSTETRICS & GYNECOLOGY

## 2021-08-02 ENCOUNTER — TELEPHONE (OUTPATIENT)
Dept: OPHTHALMOLOGY | Facility: CLINIC | Age: 41
End: 2021-08-02

## 2021-08-02 RX ORDER — PROGESTERONE 200 MG/1
200 CAPSULE ORAL 2 TIMES DAILY
COMMUNITY
End: 2022-02-01

## 2021-08-02 RX ORDER — PROGESTERONE 50 MG/ML
50 INJECTION, SOLUTION INTRAMUSCULAR DAILY
COMMUNITY
End: 2022-02-01

## 2021-08-02 RX ORDER — TACROLIMUS 1 MG/1
0.5 GRANULE, FOR SUSPENSION ORAL 2 TIMES DAILY
COMMUNITY
End: 2022-02-01

## 2021-08-02 RX ORDER — ESTRADIOL 2 MG/1
4 TABLET ORAL 2 TIMES DAILY
COMMUNITY
End: 2022-02-01

## 2021-08-05 ENCOUNTER — HOSPITAL ENCOUNTER (OUTPATIENT)
Dept: PREADMISSION TESTING | Facility: OTHER | Age: 41
Discharge: HOME | End: 2021-08-05
Attending: OPHTHALMOLOGY | Admitting: OPHTHALMOLOGY
Payer: COMMERCIAL

## 2021-08-05 ENCOUNTER — HOSPITAL ENCOUNTER (OUTPATIENT)
Facility: OTHER | Age: 41
Discharge: HOME | End: 2021-08-05
Attending: OPHTHALMOLOGY | Admitting: OPHTHALMOLOGY
Payer: COMMERCIAL

## 2021-08-05 ENCOUNTER — ANESTHESIA (OUTPATIENT)
Dept: SURGERY | Facility: OTHER | Age: 41
End: 2021-08-05
Payer: COMMERCIAL

## 2021-08-05 ENCOUNTER — ANESTHESIA EVENT (OUTPATIENT)
Dept: SURGERY | Facility: OTHER | Age: 41
End: 2021-08-05
Payer: COMMERCIAL

## 2021-08-05 VITALS
BODY MASS INDEX: 49.61 KG/M2 | RESPIRATION RATE: 18 BRPM | TEMPERATURE: 98 F | DIASTOLIC BLOOD PRESSURE: 84 MMHG | SYSTOLIC BLOOD PRESSURE: 130 MMHG | OXYGEN SATURATION: 100 % | WEIGHT: 280 LBS | HEART RATE: 58 BPM | HEIGHT: 63 IN

## 2021-08-05 DIAGNOSIS — Z01.818 PRE-OP TESTING: ICD-10-CM

## 2021-08-05 DIAGNOSIS — H25.13 NUCLEAR SCLEROTIC CATARACT OF BOTH EYES: Primary | ICD-10-CM

## 2021-08-05 DIAGNOSIS — H26.8 MATURE CATARACT: ICD-10-CM

## 2021-08-05 LAB
B-HCG UR QL: NEGATIVE
CTP QC/QA: YES
SARS-COV-2 RDRP RESP QL NAA+PROBE: NEGATIVE

## 2021-08-05 PROCEDURE — 71000015 HC POSTOP RECOV 1ST HR: Performed by: OPHTHALMOLOGY

## 2021-08-05 PROCEDURE — 37000008 HC ANESTHESIA 1ST 15 MINUTES: Performed by: OPHTHALMOLOGY

## 2021-08-05 PROCEDURE — 66999 PR FEMTO LRI: ICD-10-PCS | Mod: CSM,LT,, | Performed by: OPHTHALMOLOGY

## 2021-08-05 PROCEDURE — 36000707: Performed by: OPHTHALMOLOGY

## 2021-08-05 PROCEDURE — 81025 URINE PREGNANCY TEST: CPT | Performed by: ANESTHESIOLOGY

## 2021-08-05 PROCEDURE — 66982 XCAPSL CTRC RMVL CPLX WO ECP: CPT | Mod: LT,,, | Performed by: OPHTHALMOLOGY

## 2021-08-05 PROCEDURE — U0002 COVID-19 LAB TEST NON-CDC: HCPCS | Performed by: OPHTHALMOLOGY

## 2021-08-05 PROCEDURE — V2632 POST CHMBR INTRAOCULAR LENS: HCPCS | Performed by: OPHTHALMOLOGY

## 2021-08-05 PROCEDURE — 66982 PR REMOVAL, CATARACT, W/INSRT INTRAOC LENS, W/O ENDO CYCLO, CMPLX: ICD-10-PCS | Mod: LT,,, | Performed by: OPHTHALMOLOGY

## 2021-08-05 PROCEDURE — 37000009 HC ANESTHESIA EA ADD 15 MINS: Performed by: OPHTHALMOLOGY

## 2021-08-05 PROCEDURE — 63600175 PHARM REV CODE 636 W HCPCS: Performed by: NURSE ANESTHETIST, CERTIFIED REGISTERED

## 2021-08-05 PROCEDURE — 36000706: Performed by: OPHTHALMOLOGY

## 2021-08-05 PROCEDURE — 66999 UNLISTED PX ANT SEGMENT EYE: CPT | Mod: CSM,LT,, | Performed by: OPHTHALMOLOGY

## 2021-08-05 PROCEDURE — 25000003 PHARM REV CODE 250: Performed by: OPHTHALMOLOGY

## 2021-08-05 DEVICE — LENS EYHANCE +12.0D: Type: IMPLANTABLE DEVICE | Site: EYE | Status: FUNCTIONAL

## 2021-08-05 RX ORDER — LIDOCAINE HYDROCHLORIDE 40 MG/ML
INJECTION, SOLUTION RETROBULBAR
Status: DISCONTINUED | OUTPATIENT
Start: 2021-08-05 | End: 2021-08-05 | Stop reason: HOSPADM

## 2021-08-05 RX ORDER — PHENYLEPHRINE HYDROCHLORIDE 25 MG/ML
1 SOLUTION/ DROPS OPHTHALMIC
Status: COMPLETED | OUTPATIENT
Start: 2021-08-05 | End: 2021-08-05

## 2021-08-05 RX ORDER — TETRACAINE HYDROCHLORIDE 5 MG/ML
1 SOLUTION OPHTHALMIC
Status: COMPLETED | OUTPATIENT
Start: 2021-08-05 | End: 2021-08-05

## 2021-08-05 RX ORDER — MIDAZOLAM HYDROCHLORIDE 1 MG/ML
INJECTION INTRAMUSCULAR; INTRAVENOUS
Status: DISCONTINUED | OUTPATIENT
Start: 2021-08-05 | End: 2021-08-05

## 2021-08-05 RX ORDER — TETRACAINE HYDROCHLORIDE 5 MG/ML
SOLUTION OPHTHALMIC
Status: DISCONTINUED | OUTPATIENT
Start: 2021-08-05 | End: 2021-08-05 | Stop reason: HOSPADM

## 2021-08-05 RX ORDER — MOXIFLOXACIN 5 MG/ML
1 SOLUTION/ DROPS OPHTHALMIC
Status: COMPLETED | OUTPATIENT
Start: 2021-08-05 | End: 2021-08-05

## 2021-08-05 RX ORDER — TROPICAMIDE 10 MG/ML
1 SOLUTION/ DROPS OPHTHALMIC
Status: COMPLETED | OUTPATIENT
Start: 2021-08-05 | End: 2021-08-05

## 2021-08-05 RX ORDER — MOXIFLOXACIN 5 MG/ML
SOLUTION/ DROPS OPHTHALMIC
Status: DISCONTINUED | OUTPATIENT
Start: 2021-08-05 | End: 2021-08-05 | Stop reason: HOSPADM

## 2021-08-05 RX ORDER — PHENYLEPHRINE HYDROCHLORIDE 100 MG/ML
1 SOLUTION/ DROPS OPHTHALMIC ONCE
Status: DISCONTINUED | OUTPATIENT
Start: 2021-08-05 | End: 2021-08-05 | Stop reason: HOSPADM

## 2021-08-05 RX ORDER — ACETAMINOPHEN 325 MG/1
650 TABLET ORAL EVERY 4 HOURS PRN
Status: DISCONTINUED | OUTPATIENT
Start: 2021-08-05 | End: 2021-08-05 | Stop reason: HOSPADM

## 2021-08-05 RX ORDER — PROPARACAINE HYDROCHLORIDE 5 MG/ML
1 SOLUTION/ DROPS OPHTHALMIC
Status: DISCONTINUED | OUTPATIENT
Start: 2021-08-05 | End: 2021-08-05 | Stop reason: HOSPADM

## 2021-08-05 RX ADMIN — MOXIFLOXACIN 1 DROP: 5 SOLUTION/ DROPS OPHTHALMIC at 01:08

## 2021-08-05 RX ADMIN — TETRACAINE HYDROCHLORIDE 1 DROP: 5 SOLUTION OPHTHALMIC at 10:08

## 2021-08-05 RX ADMIN — PHENYLEPHRINE HYDROCHLORIDE 1 DROP: 25 SOLUTION/ DROPS OPHTHALMIC at 10:08

## 2021-08-05 RX ADMIN — TROPICAMIDE 1 DROP: 10 SOLUTION/ DROPS OPHTHALMIC at 10:08

## 2021-08-05 RX ADMIN — MOXIFLOXACIN 1 DROP: 5 SOLUTION/ DROPS OPHTHALMIC at 10:08

## 2021-08-05 RX ADMIN — MIDAZOLAM HYDROCHLORIDE 2 MG: 1 INJECTION, SOLUTION INTRAMUSCULAR; INTRAVENOUS at 12:08

## 2021-08-06 ENCOUNTER — LAB VISIT (OUTPATIENT)
Dept: LAB | Facility: OTHER | Age: 41
End: 2021-08-06
Attending: OBSTETRICS & GYNECOLOGY
Payer: COMMERCIAL

## 2021-08-06 ENCOUNTER — OFFICE VISIT (OUTPATIENT)
Dept: OPHTHALMOLOGY | Facility: CLINIC | Age: 41
End: 2021-08-06
Attending: OPHTHALMOLOGY
Payer: COMMERCIAL

## 2021-08-06 DIAGNOSIS — H25.12 NUCLEAR SCLEROSIS OF LEFT EYE: ICD-10-CM

## 2021-08-06 DIAGNOSIS — Z98.890 POST-OPERATIVE STATE: Primary | ICD-10-CM

## 2021-08-06 DIAGNOSIS — E28.9 OVARIAN DYSFUNCTION: Primary | ICD-10-CM

## 2021-08-06 LAB
ESTRADIOL SERPL-MCNC: 150 PG/ML
PROGEST SERPL-MCNC: 50.4 NG/ML

## 2021-08-06 PROCEDURE — 99024 POSTOP FOLLOW-UP VISIT: CPT | Mod: S$GLB,,, | Performed by: OPHTHALMOLOGY

## 2021-08-06 PROCEDURE — 99024 PR POST-OP FOLLOW-UP VISIT: ICD-10-PCS | Mod: S$GLB,,, | Performed by: OPHTHALMOLOGY

## 2021-08-06 PROCEDURE — 1159F MED LIST DOCD IN RCRD: CPT | Mod: CPTII,S$GLB,, | Performed by: OPHTHALMOLOGY

## 2021-08-06 PROCEDURE — 99999 PR PBB SHADOW E&M-EST. PATIENT-LVL III: CPT | Mod: PBBFAC,,, | Performed by: OPHTHALMOLOGY

## 2021-08-06 PROCEDURE — 1126F AMNT PAIN NOTED NONE PRSNT: CPT | Mod: CPTII,S$GLB,, | Performed by: OPHTHALMOLOGY

## 2021-08-06 PROCEDURE — 99999 PR PBB SHADOW E&M-EST. PATIENT-LVL III: ICD-10-PCS | Mod: PBBFAC,,, | Performed by: OPHTHALMOLOGY

## 2021-08-06 PROCEDURE — 1160F RVW MEDS BY RX/DR IN RCRD: CPT | Mod: CPTII,S$GLB,, | Performed by: OPHTHALMOLOGY

## 2021-08-06 PROCEDURE — 84144 ASSAY OF PROGESTERONE: CPT | Performed by: OBSTETRICS & GYNECOLOGY

## 2021-08-06 PROCEDURE — 36415 COLL VENOUS BLD VENIPUNCTURE: CPT | Performed by: OBSTETRICS & GYNECOLOGY

## 2021-08-06 PROCEDURE — 1159F PR MEDICATION LIST DOCUMENTED IN MEDICAL RECORD: ICD-10-PCS | Mod: CPTII,S$GLB,, | Performed by: OPHTHALMOLOGY

## 2021-08-06 PROCEDURE — 1126F PR PAIN SEVERITY QUANTIFIED, NO PAIN PRESENT: ICD-10-PCS | Mod: CPTII,S$GLB,, | Performed by: OPHTHALMOLOGY

## 2021-08-06 PROCEDURE — 82670 ASSAY OF TOTAL ESTRADIOL: CPT | Performed by: OBSTETRICS & GYNECOLOGY

## 2021-08-06 PROCEDURE — 1160F PR REVIEW ALL MEDS BY PRESCRIBER/CLIN PHARMACIST DOCUMENTED: ICD-10-PCS | Mod: CPTII,S$GLB,, | Performed by: OPHTHALMOLOGY

## 2021-08-09 ENCOUNTER — NURSE TRIAGE (OUTPATIENT)
Dept: ADMINISTRATIVE | Facility: CLINIC | Age: 41
End: 2021-08-09

## 2021-08-10 ENCOUNTER — OFFICE VISIT (OUTPATIENT)
Dept: OPHTHALMOLOGY | Facility: CLINIC | Age: 41
End: 2021-08-10
Attending: OPHTHALMOLOGY
Payer: COMMERCIAL

## 2021-08-10 DIAGNOSIS — H25.12 NUCLEAR SCLEROSIS OF LEFT EYE: ICD-10-CM

## 2021-08-10 DIAGNOSIS — Z98.890 POST-OPERATIVE STATE: Primary | ICD-10-CM

## 2021-08-10 PROCEDURE — 1159F PR MEDICATION LIST DOCUMENTED IN MEDICAL RECORD: ICD-10-PCS | Mod: CPTII,S$GLB,, | Performed by: OPHTHALMOLOGY

## 2021-08-10 PROCEDURE — 1160F PR REVIEW ALL MEDS BY PRESCRIBER/CLIN PHARMACIST DOCUMENTED: ICD-10-PCS | Mod: CPTII,S$GLB,, | Performed by: OPHTHALMOLOGY

## 2021-08-10 PROCEDURE — 1159F MED LIST DOCD IN RCRD: CPT | Mod: CPTII,S$GLB,, | Performed by: OPHTHALMOLOGY

## 2021-08-10 PROCEDURE — 99024 POSTOP FOLLOW-UP VISIT: CPT | Mod: S$GLB,,, | Performed by: OPHTHALMOLOGY

## 2021-08-10 PROCEDURE — 1126F PR PAIN SEVERITY QUANTIFIED, NO PAIN PRESENT: ICD-10-PCS | Mod: CPTII,S$GLB,, | Performed by: OPHTHALMOLOGY

## 2021-08-10 PROCEDURE — 99024 PR POST-OP FOLLOW-UP VISIT: ICD-10-PCS | Mod: S$GLB,,, | Performed by: OPHTHALMOLOGY

## 2021-08-10 PROCEDURE — 1126F AMNT PAIN NOTED NONE PRSNT: CPT | Mod: CPTII,S$GLB,, | Performed by: OPHTHALMOLOGY

## 2021-08-10 PROCEDURE — 99999 PR PBB SHADOW E&M-EST. PATIENT-LVL III: CPT | Mod: PBBFAC,,, | Performed by: OPHTHALMOLOGY

## 2021-08-10 PROCEDURE — 99999 PR PBB SHADOW E&M-EST. PATIENT-LVL III: ICD-10-PCS | Mod: PBBFAC,,, | Performed by: OPHTHALMOLOGY

## 2021-08-10 PROCEDURE — 1160F RVW MEDS BY RX/DR IN RCRD: CPT | Mod: CPTII,S$GLB,, | Performed by: OPHTHALMOLOGY

## 2021-08-11 ENCOUNTER — LAB VISIT (OUTPATIENT)
Dept: LAB | Facility: HOSPITAL | Age: 41
End: 2021-08-11
Payer: COMMERCIAL

## 2021-08-11 DIAGNOSIS — Z32.00 PREGNANCY EXAMINATION OR TEST, PREGNANCY UNCONFIRMED: Primary | ICD-10-CM

## 2021-08-11 LAB
HCG INTACT+B SERPL-ACNC: 332 MIU/ML
PROGEST SERPL-MCNC: 53.3 NG/ML

## 2021-08-11 PROCEDURE — 84144 ASSAY OF PROGESTERONE: CPT

## 2021-08-11 PROCEDURE — 36415 COLL VENOUS BLD VENIPUNCTURE: CPT

## 2021-08-11 PROCEDURE — 84702 CHORIONIC GONADOTROPIN TEST: CPT

## 2021-08-13 ENCOUNTER — LAB VISIT (OUTPATIENT)
Dept: LAB | Facility: HOSPITAL | Age: 41
End: 2021-08-13
Payer: COMMERCIAL

## 2021-08-13 DIAGNOSIS — O09.00 PREGNANCY WITH HISTORY OF INFERTILITY: Primary | ICD-10-CM

## 2021-08-13 LAB
ESTRADIOL SERPL-MCNC: 178 PG/ML
HCG INTACT+B SERPL-ACNC: 917 MIU/ML
PROGEST SERPL-MCNC: 30.5 NG/ML
TSH SERPL DL<=0.005 MIU/L-ACNC: 1.03 UIU/ML (ref 0.4–4)

## 2021-08-13 PROCEDURE — 36415 COLL VENOUS BLD VENIPUNCTURE: CPT

## 2021-08-13 PROCEDURE — 84443 ASSAY THYROID STIM HORMONE: CPT

## 2021-08-13 PROCEDURE — 84702 CHORIONIC GONADOTROPIN TEST: CPT

## 2021-08-13 PROCEDURE — 82670 ASSAY OF TOTAL ESTRADIOL: CPT

## 2021-08-13 PROCEDURE — 84144 ASSAY OF PROGESTERONE: CPT

## 2021-08-20 ENCOUNTER — LAB VISIT (OUTPATIENT)
Dept: LAB | Facility: HOSPITAL | Age: 41
End: 2021-08-20
Attending: OBSTETRICS & GYNECOLOGY
Payer: COMMERCIAL

## 2021-08-20 DIAGNOSIS — Z32.01 PREGNANCY EXAMINATION OR TEST, POSITIVE RESULT: Primary | ICD-10-CM

## 2021-08-20 LAB
ESTRADIOL SERPL-MCNC: 234 PG/ML
HCG INTACT+B SERPL-ACNC: 2853 MIU/ML
PROGEST SERPL-MCNC: 46.4 NG/ML

## 2021-08-20 PROCEDURE — 36415 COLL VENOUS BLD VENIPUNCTURE: CPT | Performed by: OBSTETRICS & GYNECOLOGY

## 2021-08-20 PROCEDURE — 84144 ASSAY OF PROGESTERONE: CPT | Performed by: OBSTETRICS & GYNECOLOGY

## 2021-08-20 PROCEDURE — 84702 CHORIONIC GONADOTROPIN TEST: CPT | Performed by: OBSTETRICS & GYNECOLOGY

## 2021-08-20 PROCEDURE — 82670 ASSAY OF TOTAL ESTRADIOL: CPT | Performed by: OBSTETRICS & GYNECOLOGY

## 2021-08-23 ENCOUNTER — LAB VISIT (OUTPATIENT)
Dept: LAB | Facility: HOSPITAL | Age: 41
End: 2021-08-23
Payer: COMMERCIAL

## 2021-08-23 DIAGNOSIS — O02.81 ELEVATED LEVEL OF QUANTITATIVE HCG FOR GESTATIONAL AGE IN EARLY PREGNANCY: Primary | ICD-10-CM

## 2021-08-23 LAB
ESTRADIOL SERPL-MCNC: 246 PG/ML
HCG INTACT+B SERPL-ACNC: 543 MIU/ML
PROGEST SERPL-MCNC: 24.8 NG/ML

## 2021-08-23 PROCEDURE — 36415 COLL VENOUS BLD VENIPUNCTURE: CPT

## 2021-08-23 PROCEDURE — 84144 ASSAY OF PROGESTERONE: CPT

## 2021-08-23 PROCEDURE — 84702 CHORIONIC GONADOTROPIN TEST: CPT

## 2021-08-23 PROCEDURE — 82670 ASSAY OF TOTAL ESTRADIOL: CPT

## 2021-09-23 ENCOUNTER — LAB VISIT (OUTPATIENT)
Dept: LAB | Facility: HOSPITAL | Age: 41
End: 2021-09-23
Payer: COMMERCIAL

## 2021-09-23 DIAGNOSIS — Z31.41 FERTILITY TESTING: Primary | ICD-10-CM

## 2021-09-23 LAB
GLUCOSE SERPL-MCNC: 90 MG/DL (ref 70–110)
INSULIN COLLECTION INTERVAL: NORMAL
INSULIN SERPL-ACNC: 6.9 UU/ML
PROLACTIN SERPL IA-MCNC: 38 NG/ML (ref 5.2–26.5)
TESTOST SERPL-MCNC: 113 NG/DL (ref 5–73)

## 2021-09-23 PROCEDURE — 84403 ASSAY OF TOTAL TESTOSTERONE: CPT

## 2021-09-23 PROCEDURE — 82947 ASSAY GLUCOSE BLOOD QUANT: CPT

## 2021-09-23 PROCEDURE — 83525 ASSAY OF INSULIN: CPT

## 2021-09-23 PROCEDURE — 84146 ASSAY OF PROLACTIN: CPT

## 2021-09-23 PROCEDURE — 36415 COLL VENOUS BLD VENIPUNCTURE: CPT

## 2021-10-04 ENCOUNTER — LAB VISIT (OUTPATIENT)
Dept: LAB | Facility: HOSPITAL | Age: 41
End: 2021-10-04
Payer: COMMERCIAL

## 2021-10-04 DIAGNOSIS — E21.1 SECONDARY HYPERPARATHYROIDISM, NON-RENAL: Primary | ICD-10-CM

## 2021-10-04 DIAGNOSIS — E22.1 IDIOPATHIC HYPERPROLACTINEMIA: ICD-10-CM

## 2021-10-04 LAB — PROLACTIN SERPL IA-MCNC: 9.7 NG/ML (ref 5.2–26.5)

## 2021-10-04 PROCEDURE — 36415 COLL VENOUS BLD VENIPUNCTURE: CPT | Performed by: OBSTETRICS & GYNECOLOGY

## 2021-10-04 PROCEDURE — 84146 ASSAY OF PROLACTIN: CPT | Performed by: OBSTETRICS & GYNECOLOGY

## 2021-10-20 ENCOUNTER — PATIENT MESSAGE (OUTPATIENT)
Dept: OBSTETRICS AND GYNECOLOGY | Facility: CLINIC | Age: 41
End: 2021-10-20
Payer: COMMERCIAL

## 2021-10-21 ENCOUNTER — TELEPHONE (OUTPATIENT)
Dept: OBSTETRICS AND GYNECOLOGY | Facility: CLINIC | Age: 41
End: 2021-10-21

## 2021-11-10 ENCOUNTER — OFFICE VISIT (OUTPATIENT)
Dept: OBSTETRICS AND GYNECOLOGY | Facility: CLINIC | Age: 41
End: 2021-11-10
Payer: COMMERCIAL

## 2021-11-10 VITALS
BODY MASS INDEX: 48.44 KG/M2 | DIASTOLIC BLOOD PRESSURE: 80 MMHG | HEIGHT: 63 IN | WEIGHT: 273.38 LBS | SYSTOLIC BLOOD PRESSURE: 126 MMHG

## 2021-11-10 DIAGNOSIS — Z12.4 CERVICAL CANCER SCREENING: ICD-10-CM

## 2021-11-10 DIAGNOSIS — Z01.419 ROUTINE GYNECOLOGICAL EXAMINATION: Primary | ICD-10-CM

## 2021-11-10 DIAGNOSIS — Z12.31 VISIT FOR SCREENING MAMMOGRAM: ICD-10-CM

## 2021-11-10 PROCEDURE — 99396 PR PREVENTIVE VISIT,EST,40-64: ICD-10-PCS | Mod: S$GLB,,, | Performed by: OBSTETRICS & GYNECOLOGY

## 2021-11-10 PROCEDURE — 3079F DIAST BP 80-89 MM HG: CPT | Mod: CPTII,S$GLB,, | Performed by: OBSTETRICS & GYNECOLOGY

## 2021-11-10 PROCEDURE — 99999 PR PBB SHADOW E&M-EST. PATIENT-LVL III: ICD-10-PCS | Mod: PBBFAC,,, | Performed by: OBSTETRICS & GYNECOLOGY

## 2021-11-10 PROCEDURE — 88175 CYTOPATH C/V AUTO FLUID REDO: CPT | Performed by: OBSTETRICS & GYNECOLOGY

## 2021-11-10 PROCEDURE — 1159F PR MEDICATION LIST DOCUMENTED IN MEDICAL RECORD: ICD-10-PCS | Mod: CPTII,S$GLB,, | Performed by: OBSTETRICS & GYNECOLOGY

## 2021-11-10 PROCEDURE — 3079F PR MOST RECENT DIASTOLIC BLOOD PRESSURE 80-89 MM HG: ICD-10-PCS | Mod: CPTII,S$GLB,, | Performed by: OBSTETRICS & GYNECOLOGY

## 2021-11-10 PROCEDURE — 99396 PREV VISIT EST AGE 40-64: CPT | Mod: S$GLB,,, | Performed by: OBSTETRICS & GYNECOLOGY

## 2021-11-10 PROCEDURE — 1159F MED LIST DOCD IN RCRD: CPT | Mod: CPTII,S$GLB,, | Performed by: OBSTETRICS & GYNECOLOGY

## 2021-11-10 PROCEDURE — 87624 HPV HI-RISK TYP POOLED RSLT: CPT | Performed by: OBSTETRICS & GYNECOLOGY

## 2021-11-10 PROCEDURE — 3074F SYST BP LT 130 MM HG: CPT | Mod: CPTII,S$GLB,, | Performed by: OBSTETRICS & GYNECOLOGY

## 2021-11-10 PROCEDURE — 3008F BODY MASS INDEX DOCD: CPT | Mod: CPTII,S$GLB,, | Performed by: OBSTETRICS & GYNECOLOGY

## 2021-11-10 PROCEDURE — 3074F PR MOST RECENT SYSTOLIC BLOOD PRESSURE < 130 MM HG: ICD-10-PCS | Mod: CPTII,S$GLB,, | Performed by: OBSTETRICS & GYNECOLOGY

## 2021-11-10 PROCEDURE — 99999 PR PBB SHADOW E&M-EST. PATIENT-LVL III: CPT | Mod: PBBFAC,,, | Performed by: OBSTETRICS & GYNECOLOGY

## 2021-11-10 PROCEDURE — 3008F PR BODY MASS INDEX (BMI) DOCUMENTED: ICD-10-PCS | Mod: CPTII,S$GLB,, | Performed by: OBSTETRICS & GYNECOLOGY

## 2021-12-02 ENCOUNTER — HOSPITAL ENCOUNTER (OUTPATIENT)
Dept: RADIOLOGY | Facility: HOSPITAL | Age: 41
Discharge: HOME | End: 2021-12-02
Attending: OBSTETRICS & GYNECOLOGY
Payer: COMMERCIAL

## 2021-12-02 DIAGNOSIS — Z12.31 VISIT FOR SCREENING MAMMOGRAM: ICD-10-CM

## 2021-12-02 PROCEDURE — 77067 SCR MAMMO BI INCL CAD: CPT | Mod: TC,PO

## 2021-12-02 PROCEDURE — 77063 MAMMO DIGITAL SCREENING BILAT WITH TOMO: ICD-10-PCS | Mod: 26,,, | Performed by: RADIOLOGY

## 2021-12-02 PROCEDURE — 77067 SCR MAMMO BI INCL CAD: CPT | Mod: 26,,, | Performed by: RADIOLOGY

## 2021-12-02 PROCEDURE — 77067 MAMMO DIGITAL SCREENING BILAT WITH TOMO: ICD-10-PCS | Mod: 26,,, | Performed by: RADIOLOGY

## 2021-12-02 PROCEDURE — 77063 BREAST TOMOSYNTHESIS BI: CPT | Mod: 26,,, | Performed by: RADIOLOGY

## 2021-12-08 ENCOUNTER — PATIENT MESSAGE (OUTPATIENT)
Dept: OBSTETRICS AND GYNECOLOGY | Facility: CLINIC | Age: 41
End: 2021-12-08
Payer: COMMERCIAL

## 2021-12-17 ENCOUNTER — IMMUNIZATION (OUTPATIENT)
Dept: INTERNAL MEDICINE | Facility: CLINIC | Age: 41
End: 2021-12-17
Payer: COMMERCIAL

## 2021-12-17 DIAGNOSIS — Z23 NEED FOR VACCINATION: Primary | ICD-10-CM

## 2021-12-17 PROCEDURE — 0013A COVID-19, MRNA, LNP-S, PF, 100 MCG/0.5 ML DOSE VACCINE: CPT | Mod: CV19,PBBFAC | Performed by: INTERNAL MEDICINE

## 2021-12-17 PROCEDURE — 91301 COVID-19, MRNA, LNP-S, PF, 100 MCG/0.5 ML DOSE VACCINE: CPT | Mod: PBBFAC | Performed by: INTERNAL MEDICINE

## 2021-12-28 ENCOUNTER — OFFICE VISIT (OUTPATIENT)
Dept: OPHTHALMOLOGY | Facility: CLINIC | Age: 41
End: 2021-12-28
Attending: OPHTHALMOLOGY
Payer: COMMERCIAL

## 2021-12-28 DIAGNOSIS — H40.32X3 GLAUCOMA OF LEFT EYE ASSOCIATED WITH OCULAR TRAUMA, SEVERE STAGE: Primary | ICD-10-CM

## 2021-12-28 DIAGNOSIS — Z94.7 HISTORY OF CORNEAL TRANSPLANT: ICD-10-CM

## 2021-12-28 PROCEDURE — 1159F MED LIST DOCD IN RCRD: CPT | Mod: CPTII,S$GLB,, | Performed by: OPHTHALMOLOGY

## 2021-12-28 PROCEDURE — 1160F PR REVIEW ALL MEDS BY PRESCRIBER/CLIN PHARMACIST DOCUMENTED: ICD-10-PCS | Mod: CPTII,S$GLB,, | Performed by: OPHTHALMOLOGY

## 2021-12-28 PROCEDURE — 1160F RVW MEDS BY RX/DR IN RCRD: CPT | Mod: CPTII,S$GLB,, | Performed by: OPHTHALMOLOGY

## 2021-12-28 PROCEDURE — 92014 PR EYE EXAM, EST PATIENT,COMPREHESV: ICD-10-PCS | Mod: S$GLB,,, | Performed by: OPHTHALMOLOGY

## 2021-12-28 PROCEDURE — 92014 COMPRE OPH EXAM EST PT 1/>: CPT | Mod: S$GLB,,, | Performed by: OPHTHALMOLOGY

## 2021-12-28 PROCEDURE — 99999 PR PBB SHADOW E&M-EST. PATIENT-LVL III: CPT | Mod: PBBFAC,,, | Performed by: OPHTHALMOLOGY

## 2021-12-28 PROCEDURE — 99999 PR PBB SHADOW E&M-EST. PATIENT-LVL III: ICD-10-PCS | Mod: PBBFAC,,, | Performed by: OPHTHALMOLOGY

## 2021-12-28 PROCEDURE — 1159F PR MEDICATION LIST DOCUMENTED IN MEDICAL RECORD: ICD-10-PCS | Mod: CPTII,S$GLB,, | Performed by: OPHTHALMOLOGY

## 2021-12-28 RX ORDER — FLUOROMETHOLONE 1 MG/ML
1 SUSPENSION/ DROPS OPHTHALMIC 3 TIMES DAILY
Qty: 10 ML | Refills: 3 | Status: SHIPPED | OUTPATIENT
Start: 2021-12-28 | End: 2022-01-07

## 2022-01-13 ENCOUNTER — TELEPHONE (OUTPATIENT)
Dept: INTERNAL MEDICINE | Facility: CLINIC | Age: 42
End: 2022-01-13
Payer: COMMERCIAL

## 2022-02-01 ENCOUNTER — LAB VISIT (OUTPATIENT)
Dept: LAB | Facility: HOSPITAL | Age: 42
End: 2022-02-01
Attending: HOSPITALIST
Payer: COMMERCIAL

## 2022-02-01 ENCOUNTER — OFFICE VISIT (OUTPATIENT)
Dept: INTERNAL MEDICINE | Facility: CLINIC | Age: 42
End: 2022-02-01
Payer: COMMERCIAL

## 2022-02-01 VITALS
TEMPERATURE: 98 F | HEART RATE: 62 BPM | WEIGHT: 277.13 LBS | BODY MASS INDEX: 49.1 KG/M2 | HEIGHT: 63 IN | DIASTOLIC BLOOD PRESSURE: 80 MMHG | OXYGEN SATURATION: 99 % | RESPIRATION RATE: 13 BRPM | SYSTOLIC BLOOD PRESSURE: 128 MMHG

## 2022-02-01 DIAGNOSIS — E28.2 PCOS (POLYCYSTIC OVARIAN SYNDROME): ICD-10-CM

## 2022-02-01 DIAGNOSIS — E66.01 CLASS 3 SEVERE OBESITY DUE TO EXCESS CALORIES WITH BODY MASS INDEX (BMI) OF 45.0 TO 49.9 IN ADULT, UNSPECIFIED WHETHER SERIOUS COMORBIDITY PRESENT: ICD-10-CM

## 2022-02-01 DIAGNOSIS — Z00.00 ANNUAL PHYSICAL EXAM: Primary | ICD-10-CM

## 2022-02-01 DIAGNOSIS — R19.00 RIGHT FLANK MASS: ICD-10-CM

## 2022-02-01 DIAGNOSIS — Z00.00 ANNUAL PHYSICAL EXAM: ICD-10-CM

## 2022-02-01 LAB
ALBUMIN SERPL BCP-MCNC: 3.3 G/DL (ref 3.5–5.2)
ALP SERPL-CCNC: 74 U/L (ref 55–135)
ALT SERPL W/O P-5'-P-CCNC: 12 U/L (ref 10–44)
ANION GAP SERPL CALC-SCNC: 7 MMOL/L (ref 8–16)
AST SERPL-CCNC: 13 U/L (ref 10–40)
BASOPHILS # BLD AUTO: 0.06 K/UL (ref 0–0.2)
BASOPHILS NFR BLD: 0.9 % (ref 0–1.9)
BILIRUB SERPL-MCNC: 0.5 MG/DL (ref 0.1–1)
BUN SERPL-MCNC: 13 MG/DL (ref 6–20)
CALCIUM SERPL-MCNC: 9.1 MG/DL (ref 8.7–10.5)
CHLORIDE SERPL-SCNC: 107 MMOL/L (ref 95–110)
CHOLEST SERPL-MCNC: 217 MG/DL (ref 120–199)
CHOLEST/HDLC SERPL: 3.1 {RATIO} (ref 2–5)
CO2 SERPL-SCNC: 25 MMOL/L (ref 23–29)
CREAT SERPL-MCNC: 0.7 MG/DL (ref 0.5–1.4)
DIFFERENTIAL METHOD: ABNORMAL
EOSINOPHIL # BLD AUTO: 0.1 K/UL (ref 0–0.5)
EOSINOPHIL NFR BLD: 2 % (ref 0–8)
ERYTHROCYTE [DISTWIDTH] IN BLOOD BY AUTOMATED COUNT: 15 % (ref 11.5–14.5)
EST. GFR  (AFRICAN AMERICAN): >60 ML/MIN/1.73 M^2
EST. GFR  (NON AFRICAN AMERICAN): >60 ML/MIN/1.73 M^2
ESTIMATED AVG GLUCOSE: 105 MG/DL (ref 68–131)
GLUCOSE SERPL-MCNC: 89 MG/DL (ref 70–110)
HBA1C MFR BLD: 5.3 % (ref 4–5.6)
HCT VFR BLD AUTO: 41.5 % (ref 37–48.5)
HDLC SERPL-MCNC: 69 MG/DL (ref 40–75)
HDLC SERPL: 31.8 % (ref 20–50)
HGB BLD-MCNC: 12.6 G/DL (ref 12–16)
IMM GRANULOCYTES # BLD AUTO: 0.02 K/UL (ref 0–0.04)
IMM GRANULOCYTES NFR BLD AUTO: 0.3 % (ref 0–0.5)
LDLC SERPL CALC-MCNC: 134.4 MG/DL (ref 63–159)
LYMPHOCYTES # BLD AUTO: 1.5 K/UL (ref 1–4.8)
LYMPHOCYTES NFR BLD: 21.8 % (ref 18–48)
MCH RBC QN AUTO: 25.3 PG (ref 27–31)
MCHC RBC AUTO-ENTMCNC: 30.4 G/DL (ref 32–36)
MCV RBC AUTO: 83 FL (ref 82–98)
MONOCYTES # BLD AUTO: 0.5 K/UL (ref 0.3–1)
MONOCYTES NFR BLD: 6.6 % (ref 4–15)
NEUTROPHILS # BLD AUTO: 4.7 K/UL (ref 1.8–7.7)
NEUTROPHILS NFR BLD: 68.4 % (ref 38–73)
NONHDLC SERPL-MCNC: 148 MG/DL
NRBC BLD-RTO: 0 /100 WBC
PLATELET # BLD AUTO: 256 K/UL (ref 150–450)
PMV BLD AUTO: 12 FL (ref 9.2–12.9)
POTASSIUM SERPL-SCNC: 4.3 MMOL/L (ref 3.5–5.1)
PROT SERPL-MCNC: 6.7 G/DL (ref 6–8.4)
RBC # BLD AUTO: 4.99 M/UL (ref 4–5.4)
SODIUM SERPL-SCNC: 139 MMOL/L (ref 136–145)
TRIGL SERPL-MCNC: 68 MG/DL (ref 30–150)
TSH SERPL DL<=0.005 MIU/L-ACNC: 1.78 UIU/ML (ref 0.4–4)
WBC # BLD AUTO: 6.87 K/UL (ref 3.9–12.7)

## 2022-02-01 PROCEDURE — 36415 COLL VENOUS BLD VENIPUNCTURE: CPT | Mod: PO | Performed by: HOSPITALIST

## 2022-02-01 PROCEDURE — 99396 PREV VISIT EST AGE 40-64: CPT | Mod: S$GLB,,, | Performed by: HOSPITALIST

## 2022-02-01 PROCEDURE — 3074F SYST BP LT 130 MM HG: CPT | Mod: CPTII,S$GLB,, | Performed by: HOSPITALIST

## 2022-02-01 PROCEDURE — 83036 HEMOGLOBIN GLYCOSYLATED A1C: CPT | Performed by: HOSPITALIST

## 2022-02-01 PROCEDURE — 3079F DIAST BP 80-89 MM HG: CPT | Mod: CPTII,S$GLB,, | Performed by: HOSPITALIST

## 2022-02-01 PROCEDURE — 80061 LIPID PANEL: CPT | Performed by: HOSPITALIST

## 2022-02-01 PROCEDURE — 86803 HEPATITIS C AB TEST: CPT | Performed by: HOSPITALIST

## 2022-02-01 PROCEDURE — 3008F BODY MASS INDEX DOCD: CPT | Mod: CPTII,S$GLB,, | Performed by: HOSPITALIST

## 2022-02-01 PROCEDURE — 99396 PR PREVENTIVE VISIT,EST,40-64: ICD-10-PCS | Mod: S$GLB,,, | Performed by: HOSPITALIST

## 2022-02-01 PROCEDURE — 3079F PR MOST RECENT DIASTOLIC BLOOD PRESSURE 80-89 MM HG: ICD-10-PCS | Mod: CPTII,S$GLB,, | Performed by: HOSPITALIST

## 2022-02-01 PROCEDURE — 99999 PR PBB SHADOW E&M-EST. PATIENT-LVL IV: CPT | Mod: PBBFAC,,, | Performed by: HOSPITALIST

## 2022-02-01 PROCEDURE — 99999 PR PBB SHADOW E&M-EST. PATIENT-LVL IV: ICD-10-PCS | Mod: PBBFAC,,, | Performed by: HOSPITALIST

## 2022-02-01 PROCEDURE — 1160F RVW MEDS BY RX/DR IN RCRD: CPT | Mod: CPTII,S$GLB,, | Performed by: HOSPITALIST

## 2022-02-01 PROCEDURE — 3074F PR MOST RECENT SYSTOLIC BLOOD PRESSURE < 130 MM HG: ICD-10-PCS | Mod: CPTII,S$GLB,, | Performed by: HOSPITALIST

## 2022-02-01 PROCEDURE — 1160F PR REVIEW ALL MEDS BY PRESCRIBER/CLIN PHARMACIST DOCUMENTED: ICD-10-PCS | Mod: CPTII,S$GLB,, | Performed by: HOSPITALIST

## 2022-02-01 PROCEDURE — 3008F PR BODY MASS INDEX (BMI) DOCUMENTED: ICD-10-PCS | Mod: CPTII,S$GLB,, | Performed by: HOSPITALIST

## 2022-02-01 PROCEDURE — 85025 COMPLETE CBC W/AUTO DIFF WBC: CPT | Performed by: HOSPITALIST

## 2022-02-01 PROCEDURE — 84443 ASSAY THYROID STIM HORMONE: CPT | Performed by: HOSPITALIST

## 2022-02-01 PROCEDURE — 80053 COMPREHEN METABOLIC PANEL: CPT | Performed by: HOSPITALIST

## 2022-02-01 PROCEDURE — 1159F PR MEDICATION LIST DOCUMENTED IN MEDICAL RECORD: ICD-10-PCS | Mod: CPTII,S$GLB,, | Performed by: HOSPITALIST

## 2022-02-01 PROCEDURE — 1159F MED LIST DOCD IN RCRD: CPT | Mod: CPTII,S$GLB,, | Performed by: HOSPITALIST

## 2022-02-01 NOTE — PROGRESS NOTES
Subjective:     @Patient ID: Tia Chung is a 41 y.o. female.    Chief Complaint: Mass and Annual Exam    HPI  42 yo F presents for mass, annual and to [facility] care. Pt is new to me. Has pmhx of PCOS, keratoconus s/p corneal transplant, gastric bypass. She reports she has had a mass of R flank for the past 2-3 months. Has been tender if she lays on her side. Not sure what it is     Lipid disorders/ASCVD risk (ages >/= 45 or >/= 20 if increased risk ): ordered  Eye exam: done  Cervical Cancer (Pap Smear ages 21-65 every 3 years or Pap + HPV q5 years after 30 years of age):  utd 11/2021   MMG: done 12/2021     Vaccines:   Influenza (yearly) defers  Tetanus (every 10 yrs - 1st tdap) not sure  Covid19: utd     Exercise: none currently, but plans to start soon   Diet: regular      Review of Systems   Constitutional: Negative for chills and fever.   HENT: Negative for congestion and sore throat.    Eyes: Negative for pain and visual disturbance.   Respiratory: Negative for cough and shortness of breath.    Cardiovascular: Negative for chest pain and leg swelling.   Gastrointestinal: Negative for abdominal pain, nausea and vomiting.   Endocrine: Negative for polydipsia and polyuria.   Genitourinary: Negative for difficulty urinating and dysuria.   Musculoskeletal: Negative for arthralgias and back pain.   Skin: Negative for rash and wound.   Neurological: Negative for dizziness, weakness and headaches.   Psychiatric/Behavioral: Negative for agitation and confusion.     Past medical history, surgical history, and family medical history reviewed and updated as appropriate.    Medications and allergies reviewed.     Objective:     There were no vitals filed for this visit.  There is no height or weight on file to calculate BMI.  Physical Exam  Vitals reviewed.   Constitutional:       General: She is not in acute distress.     Appearance: She is well-developed.   HENT:      Head: Normocephalic and atraumatic.       Right Ear: Tympanic membrane normal.      Left Ear: Tympanic membrane normal.      Mouth/Throat:      Mouth: Mucous membranes are moist.      Pharynx: No oropharyngeal exudate.   Eyes:      General:         Right eye: No discharge.         Left eye: No discharge.      Conjunctiva/sclera: Conjunctivae normal.   Cardiovascular:      Rate and Rhythm: Normal rate and regular rhythm.      Heart sounds: No murmur heard.  No friction rub.   Pulmonary:      Effort: Pulmonary effort is normal.      Breath sounds: Normal breath sounds.   Abdominal:      General: Bowel sounds are normal. There is no distension.      Palpations: Abdomen is soft.      Tenderness: There is no abdominal tenderness. There is no guarding.   Musculoskeletal:         General: Normal range of motion.      Cervical back: Normal range of motion and neck supple.      Right lower leg: No edema.      Left lower leg: No edema.   Lymphadenopathy:      Cervical: No cervical adenopathy.   Skin:     General: Skin is warm and dry.      Comments: + R flank mass- soft on palpation. Suspect lipoma    Neurological:      Mental Status: She is alert and oriented to person, place, and time.   Psychiatric:         Mood and Affect: Mood normal.         Behavior: Behavior normal.         Lab Results   Component Value Date    WBC 6.71 11/02/2020    HGB 11.0 (L) 11/02/2020    HCT 37.4 11/02/2020     11/02/2020    ALT 18 11/02/2020    AST 15 11/02/2020     11/02/2020    K 4.0 11/02/2020     11/02/2020    CREATININE 0.7 11/02/2020    BUN 13 11/02/2020    CO2 26 11/02/2020    TSH 1.033 08/13/2021    GLUF 90 09/23/2021       Assessment:     1. Annual physical exam    2. Right flank mass    3. Class 3 severe obesity due to excess calories with body mass index (BMI) of 45.0 to 49.9 in adult, unspecified whether serious comorbidity present    4. PCOS (polycystic ovarian syndrome)      Plan:   Tia was seen today for mass and annual exam.    Diagnoses and all  orders for this visit:    Annual physical exam  -     Hemoglobin A1C; Future  -     Comprehensive Metabolic Panel; Future  -     CBC Auto Differential; Future  -     TSH; Future  -     Lipid Panel; Future  -     Hepatitis C Antibody; Future    Right flank mass  - Suspect lipoma   -     US Soft Tissue Chest_Upper Back; Future    Class 3 severe obesity due to excess calories with body mass index (BMI) of 45.0 to 49.9 in adult, unspecified whether serious comorbidity present        -  Counseled on exercise. She plans to start a low carb/keto diet which has worked for her in the past     PCOS (polycystic ovarian syndrome)        - Stable. Continue f/u with ob/gyn     Other orders  -     tetanus and diphther. tox, PF, (TENIVAC, PF,) 5-2 Lf unit/0.5 mL Syrg; Inject 0.5 mLs into the muscle once. for 1 dose      RTC 1 year and prn     Sapna Vásquez MD  Internal Medicine    2/1/2022

## 2022-02-02 LAB — HCV AB SERPL QL IA: NEGATIVE

## 2022-02-07 ENCOUNTER — OFFICE VISIT (OUTPATIENT)
Dept: OPHTHALMOLOGY | Facility: CLINIC | Age: 42
End: 2022-02-07
Payer: COMMERCIAL

## 2022-02-07 ENCOUNTER — LAB VISIT (OUTPATIENT)
Dept: LAB | Facility: HOSPITAL | Age: 42
End: 2022-02-07
Payer: COMMERCIAL

## 2022-02-07 DIAGNOSIS — H18.603 KERATOCONUS OF BOTH EYES: ICD-10-CM

## 2022-02-07 DIAGNOSIS — E28.9 DYSFUNCTION OVARIAN: ICD-10-CM

## 2022-02-07 DIAGNOSIS — Z31.83 IN VITRO FERTILIZATION: Primary | ICD-10-CM

## 2022-02-07 DIAGNOSIS — H25.13 NUCLEAR SCLEROTIC CATARACT OF BOTH EYES: ICD-10-CM

## 2022-02-07 DIAGNOSIS — Z94.7 HISTORY OF CORNEAL TRANSPLANT: ICD-10-CM

## 2022-02-07 DIAGNOSIS — H40.31X2 GLAUCOMA OF RIGHT EYE ASSOCIATED WITH OCULAR TRAUMA, MODERATE STAGE: ICD-10-CM

## 2022-02-07 DIAGNOSIS — H40.32X3 GLAUCOMA OF LEFT EYE ASSOCIATED WITH OCULAR TRAUMA, SEVERE STAGE: Primary | ICD-10-CM

## 2022-02-07 PROBLEM — H26.8 MATURE CATARACT: Status: RESOLVED | Noted: 2021-08-05 | Resolved: 2022-02-07

## 2022-02-07 PROBLEM — H25.042 POSTERIOR SUBCAPSULAR AGE-RELATED CATARACT OF LEFT EYE: Status: RESOLVED | Noted: 2021-05-27 | Resolved: 2022-02-07

## 2022-02-07 LAB
ESTRADIOL SERPL-MCNC: 29 PG/ML
FSH SERPL-ACNC: 4.27 MIU/ML
HCG INTACT+B SERPL-ACNC: <2.4 MIU/ML
LH SERPL-ACNC: 2.8 MIU/ML
PROGEST SERPL-MCNC: 1.1 NG/ML
TSH SERPL DL<=0.005 MIU/L-ACNC: 1.71 UIU/ML (ref 0.4–4)

## 2022-02-07 PROCEDURE — 36415 COLL VENOUS BLD VENIPUNCTURE: CPT

## 2022-02-07 PROCEDURE — 84443 ASSAY THYROID STIM HORMONE: CPT

## 2022-02-07 PROCEDURE — 83002 ASSAY OF GONADOTROPIN (LH): CPT

## 2022-02-07 PROCEDURE — 99214 PR OFFICE/OUTPT VISIT, EST, LEVL IV, 30-39 MIN: ICD-10-PCS | Mod: S$GLB,,, | Performed by: OPHTHALMOLOGY

## 2022-02-07 PROCEDURE — 99999 PR PBB SHADOW E&M-EST. PATIENT-LVL III: ICD-10-PCS | Mod: PBBFAC,,, | Performed by: OPHTHALMOLOGY

## 2022-02-07 PROCEDURE — 83001 ASSAY OF GONADOTROPIN (FSH): CPT

## 2022-02-07 PROCEDURE — 1159F PR MEDICATION LIST DOCUMENTED IN MEDICAL RECORD: ICD-10-PCS | Mod: CPTII,S$GLB,, | Performed by: OPHTHALMOLOGY

## 2022-02-07 PROCEDURE — 99999 PR PBB SHADOW E&M-EST. PATIENT-LVL III: CPT | Mod: PBBFAC,,, | Performed by: OPHTHALMOLOGY

## 2022-02-07 PROCEDURE — 3044F HG A1C LEVEL LT 7.0%: CPT | Mod: CPTII,S$GLB,, | Performed by: OPHTHALMOLOGY

## 2022-02-07 PROCEDURE — 82670 ASSAY OF TOTAL ESTRADIOL: CPT

## 2022-02-07 PROCEDURE — 1160F RVW MEDS BY RX/DR IN RCRD: CPT | Mod: CPTII,S$GLB,, | Performed by: OPHTHALMOLOGY

## 2022-02-07 PROCEDURE — 3044F PR MOST RECENT HEMOGLOBIN A1C LEVEL <7.0%: ICD-10-PCS | Mod: CPTII,S$GLB,, | Performed by: OPHTHALMOLOGY

## 2022-02-07 PROCEDURE — 84702 CHORIONIC GONADOTROPIN TEST: CPT

## 2022-02-07 PROCEDURE — 1159F MED LIST DOCD IN RCRD: CPT | Mod: CPTII,S$GLB,, | Performed by: OPHTHALMOLOGY

## 2022-02-07 PROCEDURE — 92133 POSTERIOR SEGMENT OCT OPTIC NERVE(OCULAR COHERENCE TOMOGRAPHY) - OU - BOTH EYES: ICD-10-PCS | Mod: S$GLB,,, | Performed by: OPHTHALMOLOGY

## 2022-02-07 PROCEDURE — 99214 OFFICE O/P EST MOD 30 MIN: CPT | Mod: S$GLB,,, | Performed by: OPHTHALMOLOGY

## 2022-02-07 PROCEDURE — 92133 CPTRZD OPH DX IMG PST SGM ON: CPT | Mod: S$GLB,,, | Performed by: OPHTHALMOLOGY

## 2022-02-07 PROCEDURE — 1160F PR REVIEW ALL MEDS BY PRESCRIBER/CLIN PHARMACIST DOCUMENTED: ICD-10-PCS | Mod: CPTII,S$GLB,, | Performed by: OPHTHALMOLOGY

## 2022-02-07 PROCEDURE — 84144 ASSAY OF PROGESTERONE: CPT

## 2022-02-07 RX ORDER — LETROZOLE 2.5 MG/1
TABLET, FILM COATED ORAL
COMMUNITY
Start: 2021-11-18 | End: 2022-10-12

## 2022-02-07 RX ORDER — METFORMIN HYDROCHLORIDE 500 MG/1
TABLET, EXTENDED RELEASE ORAL
COMMUNITY
Start: 2021-12-20 | End: 2022-10-12

## 2022-02-07 NOTE — PROGRESS NOTES
HPI     DLS: 12/28/2021-[place]ron  05/26/2021-[place]     Glaucoma eval   Mother had a history of glaucoma   Keratoconus OU   NSC OS   PKP OD   S/p removal of suture OD (7/19/19)   S/p DSEK OS (5/10/18).       FML BID OS  Cosopt BID OU   Latanoprost Q HS OU   Brimonidine BID OU     Last edited by Rylie Ohara MA on 2/7/2022  2:50 PM. (History)            Assessment /Plan     For exam results, see Encounter Report.    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, severe stage  -     Zapien Visual Field - OU - Extended - Both Eyes    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, moderate stage  -     Zapien Visual Field - OU - Extended - Both Eyes    Nuclear sclerotic cataract of both eyes    History of corneal transplant    Keratoconus of both eyes      Therapist  Mood D/O      Multifactorial Glaucoma OS > OD  Trauma  Steroids  Possible POAG    26 // 47 c steroids 03/10/2021    --> Get family checked    CCT  570 // 642    Mid - low teens --> achieved    Both eyes --> good adherence and tolerating  Cosopt BID  Alphagan BID  Xal q day  FML BID    Consider Xen Gel // GDD as discussed    P Aron  K-Conus  PKP OU  Hx Rejection OS --> PKP   --> DSEK / Pigment Endo  --> Quiet    NSC OD  CE PRN  Observe    PC IOL OS  Quiet  Observe    Plan  RTC 4 months IOP & DFE and HVF  RTC sooner prn with good understanding

## 2022-02-10 ENCOUNTER — HOSPITAL ENCOUNTER (OUTPATIENT)
Dept: RADIOLOGY | Facility: HOSPITAL | Age: 42
Discharge: HOME | End: 2022-02-10
Attending: HOSPITALIST
Payer: COMMERCIAL

## 2022-02-10 DIAGNOSIS — R19.00 RIGHT FLANK MASS: ICD-10-CM

## 2022-02-10 PROCEDURE — 76604 US EXAM CHEST: CPT | Mod: TC

## 2022-02-10 PROCEDURE — 76604 US SOFT TISSUE CHEST_UPPER BACK: ICD-10-PCS | Mod: 26,,, | Performed by: INTERNAL MEDICINE

## 2022-02-10 PROCEDURE — 76604 US EXAM CHEST: CPT | Mod: 26,,, | Performed by: INTERNAL MEDICINE

## 2022-02-14 ENCOUNTER — LAB VISIT (OUTPATIENT)
Dept: LAB | Facility: HOSPITAL | Age: 42
End: 2022-02-14
Payer: COMMERCIAL

## 2022-02-14 DIAGNOSIS — Z31.83 PROCREATIVE MANAGEMENT FOR ASSISTED FERTILITY PROCEDURE CYCLE: Primary | ICD-10-CM

## 2022-02-14 LAB
ESTRADIOL SERPL-MCNC: 113 PG/ML
LH SERPL-ACNC: 3.7 MIU/ML
PROGEST SERPL-MCNC: 0.1 NG/ML

## 2022-02-14 PROCEDURE — 36415 COLL VENOUS BLD VENIPUNCTURE: CPT

## 2022-02-14 PROCEDURE — 83002 ASSAY OF GONADOTROPIN (LH): CPT

## 2022-02-14 PROCEDURE — 84144 ASSAY OF PROGESTERONE: CPT

## 2022-02-14 PROCEDURE — 82670 ASSAY OF TOTAL ESTRADIOL: CPT

## 2022-02-25 ENCOUNTER — LAB VISIT (OUTPATIENT)
Dept: LAB | Facility: HOSPITAL | Age: 42
End: 2022-02-25
Payer: COMMERCIAL

## 2022-02-25 DIAGNOSIS — Z31.49 OTHER INVESTIGATION AND TESTING FOR PROCREATIVE MANAGEMENT: Primary | ICD-10-CM

## 2022-02-25 LAB
ESTRADIOL SERPL-MCNC: 141 PG/ML
PROGEST SERPL-MCNC: 33.9 NG/ML

## 2022-02-25 PROCEDURE — 36415 COLL VENOUS BLD VENIPUNCTURE: CPT

## 2022-02-25 PROCEDURE — 84144 ASSAY OF PROGESTERONE: CPT

## 2022-02-25 PROCEDURE — 82670 ASSAY OF TOTAL ESTRADIOL: CPT

## 2022-03-02 ENCOUNTER — LAB VISIT (OUTPATIENT)
Dept: LAB | Facility: HOSPITAL | Age: 42
End: 2022-03-02
Payer: COMMERCIAL

## 2022-03-02 DIAGNOSIS — Z32.00 POSSIBLE PREGNANCY, NOT YET CONFIRMED: Primary | ICD-10-CM

## 2022-03-02 LAB
HCG INTACT+B SERPL-ACNC: 246 MIU/ML
PROGEST SERPL-MCNC: 37.5 NG/ML

## 2022-03-02 PROCEDURE — 84702 CHORIONIC GONADOTROPIN TEST: CPT

## 2022-03-02 PROCEDURE — 36415 COLL VENOUS BLD VENIPUNCTURE: CPT

## 2022-03-02 PROCEDURE — 84144 ASSAY OF PROGESTERONE: CPT

## 2022-03-04 ENCOUNTER — LAB VISIT (OUTPATIENT)
Dept: LAB | Facility: HOSPITAL | Age: 42
End: 2022-03-04
Payer: COMMERCIAL

## 2022-03-04 DIAGNOSIS — Z32.01 PREGNANCY EXAMINATION OR TEST, POSITIVE RESULT: Primary | ICD-10-CM

## 2022-03-04 LAB
ESTRADIOL SERPL-MCNC: 123 PG/ML
HCG INTACT+B SERPL-ACNC: 557 MIU/ML
PROGEST SERPL-MCNC: 31.2 NG/ML
TSH SERPL DL<=0.005 MIU/L-ACNC: 1.57 UIU/ML (ref 0.4–4)

## 2022-03-04 PROCEDURE — 84144 ASSAY OF PROGESTERONE: CPT

## 2022-03-04 PROCEDURE — 84443 ASSAY THYROID STIM HORMONE: CPT

## 2022-03-04 PROCEDURE — 82670 ASSAY OF TOTAL ESTRADIOL: CPT

## 2022-03-04 PROCEDURE — 84702 CHORIONIC GONADOTROPIN TEST: CPT

## 2022-03-04 PROCEDURE — 36415 COLL VENOUS BLD VENIPUNCTURE: CPT

## 2022-03-11 ENCOUNTER — LAB VISIT (OUTPATIENT)
Dept: LAB | Facility: HOSPITAL | Age: 42
End: 2022-03-11
Payer: COMMERCIAL

## 2022-03-11 DIAGNOSIS — Z32.01 PREGNANCY EXAMINATION OR TEST, POSITIVE RESULT: Primary | ICD-10-CM

## 2022-03-11 LAB
ESTRADIOL SERPL-MCNC: 182 PG/ML
HCG INTACT+B SERPL-ACNC: 6290 MIU/ML
PROGEST SERPL-MCNC: 40.3 NG/ML

## 2022-03-11 PROCEDURE — 84702 CHORIONIC GONADOTROPIN TEST: CPT

## 2022-03-11 PROCEDURE — 84144 ASSAY OF PROGESTERONE: CPT

## 2022-03-11 PROCEDURE — 36415 COLL VENOUS BLD VENIPUNCTURE: CPT

## 2022-03-11 PROCEDURE — 82670 ASSAY OF TOTAL ESTRADIOL: CPT

## 2022-03-18 ENCOUNTER — LAB VISIT (OUTPATIENT)
Dept: LAB | Facility: HOSPITAL | Age: 42
End: 2022-03-18
Payer: COMMERCIAL

## 2022-03-18 DIAGNOSIS — O09.00 PREGNANCY WITH HISTORY OF INFERTILITY: Primary | ICD-10-CM

## 2022-03-18 LAB
ESTRADIOL SERPL-MCNC: 147 PG/ML
HCG INTACT+B SERPL-ACNC: 110 MIU/ML
PROGEST SERPL-MCNC: 38.9 NG/ML

## 2022-03-18 PROCEDURE — 84144 ASSAY OF PROGESTERONE: CPT

## 2022-03-18 PROCEDURE — 82670 ASSAY OF TOTAL ESTRADIOL: CPT

## 2022-03-18 PROCEDURE — 84702 CHORIONIC GONADOTROPIN TEST: CPT

## 2022-03-18 PROCEDURE — 36415 COLL VENOUS BLD VENIPUNCTURE: CPT

## 2022-07-01 ENCOUNTER — CLINICAL SUPPORT (OUTPATIENT)
Dept: OPHTHALMOLOGY | Facility: CLINIC | Age: 42
End: 2022-07-01
Payer: COMMERCIAL

## 2022-07-01 ENCOUNTER — OFFICE VISIT (OUTPATIENT)
Dept: OPHTHALMOLOGY | Facility: CLINIC | Age: 42
End: 2022-07-01
Payer: COMMERCIAL

## 2022-07-01 DIAGNOSIS — H25.13 NUCLEAR SCLEROTIC CATARACT OF BOTH EYES: ICD-10-CM

## 2022-07-01 DIAGNOSIS — H40.32X3 GLAUCOMA OF LEFT EYE ASSOCIATED WITH OCULAR TRAUMA, SEVERE STAGE: Primary | ICD-10-CM

## 2022-07-01 DIAGNOSIS — H40.31X2 GLAUCOMA OF RIGHT EYE ASSOCIATED WITH OCULAR TRAUMA, MODERATE STAGE: ICD-10-CM

## 2022-07-01 DIAGNOSIS — H18.603 KERATOCONUS OF BOTH EYES: ICD-10-CM

## 2022-07-01 DIAGNOSIS — H40.32X3 GLAUCOMA OF LEFT EYE ASSOCIATED WITH OCULAR TRAUMA, SEVERE STAGE: ICD-10-CM

## 2022-07-01 DIAGNOSIS — Z94.7 HISTORY OF CORNEAL TRANSPLANT: ICD-10-CM

## 2022-07-01 PROCEDURE — 99214 OFFICE O/P EST MOD 30 MIN: CPT | Mod: S$GLB,,, | Performed by: OPHTHALMOLOGY

## 2022-07-01 PROCEDURE — 1160F RVW MEDS BY RX/DR IN RCRD: CPT | Mod: CPTII,S$GLB,, | Performed by: OPHTHALMOLOGY

## 2022-07-01 PROCEDURE — 1159F PR MEDICATION LIST DOCUMENTED IN MEDICAL RECORD: ICD-10-PCS | Mod: CPTII,S$GLB,, | Performed by: OPHTHALMOLOGY

## 2022-07-01 PROCEDURE — 3044F HG A1C LEVEL LT 7.0%: CPT | Mod: CPTII,S$GLB,, | Performed by: OPHTHALMOLOGY

## 2022-07-01 PROCEDURE — 99999 PR PBB SHADOW E&M-EST. PATIENT-LVL II: ICD-10-PCS | Mod: PBBFAC,,, | Performed by: OPHTHALMOLOGY

## 2022-07-01 PROCEDURE — 1160F PR REVIEW ALL MEDS BY PRESCRIBER/CLIN PHARMACIST DOCUMENTED: ICD-10-PCS | Mod: CPTII,S$GLB,, | Performed by: OPHTHALMOLOGY

## 2022-07-01 PROCEDURE — 1159F MED LIST DOCD IN RCRD: CPT | Mod: CPTII,S$GLB,, | Performed by: OPHTHALMOLOGY

## 2022-07-01 PROCEDURE — 99214 PR OFFICE/OUTPT VISIT, EST, LEVL IV, 30-39 MIN: ICD-10-PCS | Mod: S$GLB,,, | Performed by: OPHTHALMOLOGY

## 2022-07-01 PROCEDURE — 3044F PR MOST RECENT HEMOGLOBIN A1C LEVEL <7.0%: ICD-10-PCS | Mod: CPTII,S$GLB,, | Performed by: OPHTHALMOLOGY

## 2022-07-01 PROCEDURE — 99999 PR PBB SHADOW E&M-EST. PATIENT-LVL II: CPT | Mod: PBBFAC,,, | Performed by: OPHTHALMOLOGY

## 2022-07-01 NOTE — PROGRESS NOTES
HPI     DLS: 02/07/2022  Pt states that her left eye is always red.   HVF/IOP/DFE  Glaucoma   Keratoconus OU   NSC OS   PKP OD   S/p removal of suture OD (7/19/19)   S/p DSEK OS (5/10/18).       Cosopt BID OU   Latanoprost Q HS OU   Brimonidine BID OU     Last edited by Rylie Ohara MA on 7/1/2022  8:35 AM. (History)            Assessment /Plan     For exam results, see Encounter Report.    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, severe stage    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, moderate stage    Nuclear sclerotic cataract of both eyes    Keratoconus of both eyes    History of corneal transplant      Patient with Spouse / Poornima : RN at [facility] D/O      Multifactorial Glaucoma OS > OD  Trauma  Steroids  Possible POAG    26 // 47 c steroids 03/10/2021    --> Get family checked    CCT  570 // 642    Mid - low teens --> achieved    Both eyes --> good adherence and tolerating --> CSM  Cosopt BID  Alphagan BID  Xal q day  FML BID    Consider Xen Gel // GDD as discussed    P Aron Caraballo  PKP OU  Hx Rejection OS --> PKP   --> DSEK / Pigment Endo  --> Quiet    NSC OD  CE PRN  Observe    PC IOL OS  Quiet  Observe    Plan  RTC Dr Hitchcock CTL  RTC 4 months CCT, IOP & OCT RNFL  RTC sooner prn with good understanding

## 2022-07-11 ENCOUNTER — LAB VISIT (OUTPATIENT)
Dept: LAB | Facility: HOSPITAL | Age: 42
End: 2022-07-11
Payer: COMMERCIAL

## 2022-07-11 DIAGNOSIS — E28.9 DISORDER OF ENDOCRINE OVARY: Primary | ICD-10-CM

## 2022-07-11 LAB
ESTRADIOL SERPL-MCNC: 56 PG/ML
FSH SERPL-ACNC: 6.04 MIU/ML
HCG INTACT+B SERPL-ACNC: <2.4 MIU/ML
LH SERPL-ACNC: 3.2 MIU/ML
PROGEST SERPL-MCNC: 0.2 NG/ML
TSH SERPL DL<=0.005 MIU/L-ACNC: 1.9 UIU/ML (ref 0.4–4)

## 2022-07-11 PROCEDURE — 84702 CHORIONIC GONADOTROPIN TEST: CPT

## 2022-07-11 PROCEDURE — 36415 COLL VENOUS BLD VENIPUNCTURE: CPT

## 2022-07-11 PROCEDURE — 82670 ASSAY OF TOTAL ESTRADIOL: CPT

## 2022-07-11 PROCEDURE — 83001 ASSAY OF GONADOTROPIN (FSH): CPT

## 2022-07-11 PROCEDURE — 83002 ASSAY OF GONADOTROPIN (LH): CPT

## 2022-07-11 PROCEDURE — 84144 ASSAY OF PROGESTERONE: CPT

## 2022-07-11 PROCEDURE — 84443 ASSAY THYROID STIM HORMONE: CPT

## 2022-07-18 ENCOUNTER — LAB VISIT (OUTPATIENT)
Dept: LAB | Facility: HOSPITAL | Age: 42
End: 2022-07-18
Payer: COMMERCIAL

## 2022-07-18 DIAGNOSIS — E28.9 DISORDER OF ENDOCRINE OVARY: Primary | ICD-10-CM

## 2022-07-18 LAB
ESTRADIOL SERPL-MCNC: 147 PG/ML
PROGEST SERPL-MCNC: 0.1 NG/ML

## 2022-07-18 PROCEDURE — 36415 COLL VENOUS BLD VENIPUNCTURE: CPT

## 2022-07-18 PROCEDURE — 82670 ASSAY OF TOTAL ESTRADIOL: CPT

## 2022-07-18 PROCEDURE — 84144 ASSAY OF PROGESTERONE: CPT

## 2022-07-19 ENCOUNTER — OFFICE VISIT (OUTPATIENT)
Dept: OPTOMETRY | Facility: CLINIC | Age: 42
End: 2022-07-19
Payer: COMMERCIAL

## 2022-07-19 DIAGNOSIS — Z94.7 CORNEA REPLACED BY TRANSPLANT: Primary | ICD-10-CM

## 2022-07-19 DIAGNOSIS — Z96.1 PSEUDOPHAKIA OF BOTH EYES: ICD-10-CM

## 2022-07-19 DIAGNOSIS — H52.213 IRREGULAR ASTIGMATISM OF BOTH EYES: ICD-10-CM

## 2022-07-19 PROCEDURE — 92015 DETERMINE REFRACTIVE STATE: CPT | Mod: S$GLB,,, | Performed by: OPTOMETRIST

## 2022-07-19 PROCEDURE — 1159F MED LIST DOCD IN RCRD: CPT | Mod: CPTII,S$GLB,, | Performed by: OPTOMETRIST

## 2022-07-19 PROCEDURE — 3044F PR MOST RECENT HEMOGLOBIN A1C LEVEL <7.0%: ICD-10-PCS | Mod: CPTII,S$GLB,, | Performed by: OPTOMETRIST

## 2022-07-19 PROCEDURE — 3044F HG A1C LEVEL LT 7.0%: CPT | Mod: CPTII,S$GLB,, | Performed by: OPTOMETRIST

## 2022-07-19 PROCEDURE — 99999 PR PBB SHADOW E&M-EST. PATIENT-LVL III: ICD-10-PCS | Mod: PBBFAC,,, | Performed by: OPTOMETRIST

## 2022-07-19 PROCEDURE — 92015 PR REFRACTION: ICD-10-PCS | Mod: S$GLB,,, | Performed by: OPTOMETRIST

## 2022-07-19 PROCEDURE — 99999 PR PBB SHADOW E&M-EST. PATIENT-LVL III: CPT | Mod: PBBFAC,,, | Performed by: OPTOMETRIST

## 2022-07-19 PROCEDURE — 92014 COMPRE OPH EXAM EST PT 1/>: CPT | Mod: S$GLB,,, | Performed by: OPTOMETRIST

## 2022-07-19 PROCEDURE — 92014 PR EYE EXAM, EST PATIENT,COMPREHESV: ICD-10-PCS | Mod: S$GLB,,, | Performed by: OPTOMETRIST

## 2022-07-19 PROCEDURE — 1159F PR MEDICATION LIST DOCUMENTED IN MEDICAL RECORD: ICD-10-PCS | Mod: CPTII,S$GLB,, | Performed by: OPTOMETRIST

## 2022-07-19 NOTE — PROGRESS NOTES
HPI     DLS: 07/01/2022 Dr. Chris    40 y/o here for routine eye exam.  Pt here for refraction.    Last edited by Marcela Madison on 7/19/2022 11:30 AM. (History)            Assessment /Plan     For exam results, see Encounter Report.    Cornea replaced by transplant  -stable OU  -scleral lens optional    Pseudophakia of both eyes  -clear, centered    Irregular astigmatism of both eyes  Eyeglass Final Rx     Eyeglass Final Rx       Sphere Cylinder Axis Dist VA    Right -0.50 +1.50 075 20/30    Left [place] +1.00 105 20/50+    Type: SVL    Expiration Date: 7/19/2023                  RTC 1 yr

## 2022-07-29 ENCOUNTER — LAB VISIT (OUTPATIENT)
Dept: LAB | Facility: HOSPITAL | Age: 42
End: 2022-07-29
Payer: COMMERCIAL

## 2022-07-29 DIAGNOSIS — D75.829: Primary | ICD-10-CM

## 2022-07-29 DIAGNOSIS — Z31.49 OTHER INVESTIGATION AND TESTING FOR PROCREATIVE MANAGEMENT: ICD-10-CM

## 2022-07-29 LAB
ESTRADIOL SERPL-MCNC: 172 PG/ML
FACT X PPP CHRO-ACNC: 0.34 IU/ML (ref 0.3–0.7)
PROGEST SERPL-MCNC: 48.2 NG/ML

## 2022-07-29 PROCEDURE — 84144 ASSAY OF PROGESTERONE: CPT

## 2022-07-29 PROCEDURE — 85520 HEPARIN ASSAY: CPT

## 2022-07-29 PROCEDURE — 82670 ASSAY OF TOTAL ESTRADIOL: CPT

## 2022-07-29 PROCEDURE — 36415 COLL VENOUS BLD VENIPUNCTURE: CPT

## 2022-08-02 ENCOUNTER — LAB VISIT (OUTPATIENT)
Dept: LAB | Facility: HOSPITAL | Age: 42
End: 2022-08-02
Payer: COMMERCIAL

## 2022-08-02 DIAGNOSIS — D68.61 ANTIPHOSPHOLIPID SYNDROME: Primary | ICD-10-CM

## 2022-08-02 DIAGNOSIS — E55.9 VITAMIN D DEFICIENCY DISEASE: ICD-10-CM

## 2022-08-02 DIAGNOSIS — D89.89 RADIATION CHIMERA: ICD-10-CM

## 2022-08-02 LAB
BASOPHILS # BLD AUTO: 0.06 K/UL (ref 0–0.2)
BASOPHILS NFR BLD: 0.5 % (ref 0–1.9)
DIFFERENTIAL METHOD: ABNORMAL
EOSINOPHIL # BLD AUTO: 0.1 K/UL (ref 0–0.5)
EOSINOPHIL NFR BLD: 0.7 % (ref 0–8)
ERYTHROCYTE [DISTWIDTH] IN BLOOD BY AUTOMATED COUNT: 15.2 % (ref 11.5–14.5)
HCG INTACT+B SERPL-ACNC: <2.4 MIU/ML
HCT VFR BLD AUTO: 38.8 % (ref 37–48.5)
HGB BLD-MCNC: 12.4 G/DL (ref 12–16)
IMM GRANULOCYTES # BLD AUTO: 0.06 K/UL (ref 0–0.04)
IMM GRANULOCYTES NFR BLD AUTO: 0.5 % (ref 0–0.5)
LYMPHOCYTES # BLD AUTO: 1.5 K/UL (ref 1–4.8)
LYMPHOCYTES NFR BLD: 11.5 % (ref 18–48)
MCH RBC QN AUTO: 26.4 PG (ref 27–31)
MCHC RBC AUTO-ENTMCNC: 32 G/DL (ref 32–36)
MCV RBC AUTO: 83 FL (ref 82–98)
MONOCYTES # BLD AUTO: 0.7 K/UL (ref 0.3–1)
MONOCYTES NFR BLD: 5.5 % (ref 4–15)
NEUTROPHILS # BLD AUTO: 10.7 K/UL (ref 1.8–7.7)
NEUTROPHILS NFR BLD: 81.3 % (ref 38–73)
NRBC BLD-RTO: 0 /100 WBC
PLATELET # BLD AUTO: 222 K/UL (ref 150–450)
PMV BLD AUTO: 12 FL (ref 9.2–12.9)
RBC # BLD AUTO: 4.69 M/UL (ref 4–5.4)
WBC # BLD AUTO: 13.17 K/UL (ref 3.9–12.7)

## 2022-08-02 PROCEDURE — 86146 BETA-2 GLYCOPROTEIN ANTIBODY: CPT

## 2022-08-02 PROCEDURE — 86148 ANTI-PHOSPHOLIPID ANTIBODY: CPT

## 2022-08-02 PROCEDURE — 86147 CARDIOLIPIN ANTIBODY EA IG: CPT

## 2022-08-02 PROCEDURE — 84702 CHORIONIC GONADOTROPIN TEST: CPT

## 2022-08-02 PROCEDURE — 85025 COMPLETE CBC W/AUTO DIFF WBC: CPT

## 2022-08-02 PROCEDURE — 82652 VIT D 1 25-DIHYDROXY: CPT

## 2022-08-02 PROCEDURE — 36415 COLL VENOUS BLD VENIPUNCTURE: CPT

## 2022-08-04 LAB
B2 GLYCOPROT1 IGG SERPL IA-ACNC: <9.4 U/ML
B2 GLYCOPROT1 IGM SERPL IA-ACNC: <9.4 U/ML

## 2022-08-05 LAB
1,25(OH)2D3 SERPL-MCNC: 50 PG/ML (ref 20–79)
CARDIOLIPIN IGG SER IA-ACNC: <9.4 GPL (ref 0–14.99)
CARDIOLIPIN IGM SER IA-ACNC: <9.4 MPL (ref 0–12.49)

## 2022-08-09 LAB
PS IGA SER-ACNC: <20 U/ML
PS IGG SER-ACNC: <10 U/ML
PS IGM SER-ACNC: <25 U/ML

## 2022-08-10 ENCOUNTER — LAB VISIT (OUTPATIENT)
Dept: LAB | Facility: HOSPITAL | Age: 42
End: 2022-08-10
Payer: COMMERCIAL

## 2022-08-10 DIAGNOSIS — E28.9 DYSFUNCTION OVARIAN: Primary | ICD-10-CM

## 2022-08-10 LAB
ESTRADIOL SERPL-MCNC: 33 PG/ML
LH SERPL-ACNC: 3.7 MIU/ML
PROGEST SERPL-MCNC: 1.4 NG/ML

## 2022-08-10 PROCEDURE — 82670 ASSAY OF TOTAL ESTRADIOL: CPT

## 2022-08-10 PROCEDURE — 84144 ASSAY OF PROGESTERONE: CPT

## 2022-08-10 PROCEDURE — 83002 ASSAY OF GONADOTROPIN (LH): CPT

## 2022-08-10 PROCEDURE — 36415 COLL VENOUS BLD VENIPUNCTURE: CPT

## 2022-09-06 ENCOUNTER — PATIENT MESSAGE (OUTPATIENT)
Dept: INTERNAL MEDICINE | Facility: CLINIC | Age: 42
End: 2022-09-06
Payer: COMMERCIAL

## 2022-09-15 ENCOUNTER — OFFICE VISIT (OUTPATIENT)
Dept: INTERNAL MEDICINE | Facility: CLINIC | Age: 42
End: 2022-09-15
Payer: COMMERCIAL

## 2022-09-15 VITALS
WEIGHT: 293 LBS | OXYGEN SATURATION: 99 % | HEART RATE: 69 BPM | TEMPERATURE: 98 F | DIASTOLIC BLOOD PRESSURE: 92 MMHG | RESPIRATION RATE: 20 BRPM | BODY MASS INDEX: 53.92 KG/M2 | HEIGHT: 62 IN | SYSTOLIC BLOOD PRESSURE: 152 MMHG

## 2022-09-15 DIAGNOSIS — U07.1 COVID-19: ICD-10-CM

## 2022-09-15 DIAGNOSIS — M79.10 MUSCLE PAIN: ICD-10-CM

## 2022-09-15 DIAGNOSIS — U07.1 COVID-19 VIRUS DETECTED: ICD-10-CM

## 2022-09-15 DIAGNOSIS — M76.62 ACHILLES TENDINITIS OF LEFT LOWER EXTREMITY: ICD-10-CM

## 2022-09-15 DIAGNOSIS — R03.0 ELEVATED BP WITHOUT DIAGNOSIS OF HYPERTENSION: ICD-10-CM

## 2022-09-15 DIAGNOSIS — D17.9 LIPOMA, UNSPECIFIED SITE: Primary | ICD-10-CM

## 2022-09-15 LAB — SARS-COV-2 RNA RESP QL NAA+PROBE: DETECTED

## 2022-09-15 PROCEDURE — U0005 INFEC AGEN DETEC AMPLI PROBE: HCPCS | Performed by: HOSPITALIST

## 2022-09-15 PROCEDURE — 1159F MED LIST DOCD IN RCRD: CPT | Mod: CPTII,S$GLB,, | Performed by: HOSPITALIST

## 2022-09-15 PROCEDURE — 99999 PR PBB SHADOW E&M-EST. PATIENT-LVL V: ICD-10-PCS | Mod: PBBFAC,,, | Performed by: HOSPITALIST

## 2022-09-15 PROCEDURE — U0003 INFECTIOUS AGENT DETECTION BY NUCLEIC ACID (DNA OR RNA); SEVERE ACUTE RESPIRATORY SYNDROME CORONAVIRUS 2 (SARS-COV-2) (CORONAVIRUS DISEASE [COVID-19]), AMPLIFIED PROBE TECHNIQUE, MAKING USE OF HIGH THROUGHPUT TECHNOLOGIES AS DESCRIBED BY CMS-2020-01-R: HCPCS | Performed by: HOSPITALIST

## 2022-09-15 PROCEDURE — 3008F BODY MASS INDEX DOCD: CPT | Mod: CPTII,S$GLB,, | Performed by: HOSPITALIST

## 2022-09-15 PROCEDURE — 99214 OFFICE O/P EST MOD 30 MIN: CPT | Mod: S$GLB,,, | Performed by: HOSPITALIST

## 2022-09-15 PROCEDURE — 3077F SYST BP >= 140 MM HG: CPT | Mod: CPTII,S$GLB,, | Performed by: HOSPITALIST

## 2022-09-15 PROCEDURE — 99214 PR OFFICE/OUTPT VISIT, EST, LEVL IV, 30-39 MIN: ICD-10-PCS | Mod: S$GLB,,, | Performed by: HOSPITALIST

## 2022-09-15 PROCEDURE — 3080F PR MOST RECENT DIASTOLIC BLOOD PRESSURE >= 90 MM HG: ICD-10-PCS | Mod: CPTII,S$GLB,, | Performed by: HOSPITALIST

## 2022-09-15 PROCEDURE — 3044F PR MOST RECENT HEMOGLOBIN A1C LEVEL <7.0%: ICD-10-PCS | Mod: CPTII,S$GLB,, | Performed by: HOSPITALIST

## 2022-09-15 PROCEDURE — 3044F HG A1C LEVEL LT 7.0%: CPT | Mod: CPTII,S$GLB,, | Performed by: HOSPITALIST

## 2022-09-15 PROCEDURE — 3077F PR MOST RECENT SYSTOLIC BLOOD PRESSURE >= 140 MM HG: ICD-10-PCS | Mod: CPTII,S$GLB,, | Performed by: HOSPITALIST

## 2022-09-15 PROCEDURE — 1160F RVW MEDS BY RX/DR IN RCRD: CPT | Mod: CPTII,S$GLB,, | Performed by: HOSPITALIST

## 2022-09-15 PROCEDURE — 3080F DIAST BP >= 90 MM HG: CPT | Mod: CPTII,S$GLB,, | Performed by: HOSPITALIST

## 2022-09-15 PROCEDURE — 99999 PR PBB SHADOW E&M-EST. PATIENT-LVL V: CPT | Mod: PBBFAC,,, | Performed by: HOSPITALIST

## 2022-09-15 PROCEDURE — 3008F PR BODY MASS INDEX (BMI) DOCUMENTED: ICD-10-PCS | Mod: CPTII,S$GLB,, | Performed by: HOSPITALIST

## 2022-09-15 PROCEDURE — 1159F PR MEDICATION LIST DOCUMENTED IN MEDICAL RECORD: ICD-10-PCS | Mod: CPTII,S$GLB,, | Performed by: HOSPITALIST

## 2022-09-15 PROCEDURE — 1160F PR REVIEW ALL MEDS BY PRESCRIBER/CLIN PHARMACIST DOCUMENTED: ICD-10-PCS | Mod: CPTII,S$GLB,, | Performed by: HOSPITALIST

## 2022-10-12 ENCOUNTER — OFFICE VISIT (OUTPATIENT)
Dept: SURGERY | Facility: CLINIC | Age: 42
End: 2022-10-12
Payer: COMMERCIAL

## 2022-10-12 VITALS
BODY MASS INDEX: 53.92 KG/M2 | DIASTOLIC BLOOD PRESSURE: 87 MMHG | HEART RATE: 83 BPM | HEIGHT: 62 IN | WEIGHT: 293 LBS | SYSTOLIC BLOOD PRESSURE: 185 MMHG | TEMPERATURE: 99 F

## 2022-10-12 DIAGNOSIS — E66.01 CLASS 3 SEVERE OBESITY DUE TO EXCESS CALORIES WITH BODY MASS INDEX (BMI) OF 50.0 TO 59.9 IN ADULT, UNSPECIFIED WHETHER SERIOUS COMORBIDITY PRESENT: ICD-10-CM

## 2022-10-12 DIAGNOSIS — D17.1 LIPOMA OF TORSO: Primary | ICD-10-CM

## 2022-10-12 PROCEDURE — 99999 PR PBB SHADOW E&M-EST. PATIENT-LVL IV: CPT | Mod: PBBFAC,,, | Performed by: SURGERY

## 2022-10-12 PROCEDURE — 99203 PR OFFICE/OUTPT VISIT, NEW, LEVL III, 30-44 MIN: ICD-10-PCS | Mod: S$GLB,,, | Performed by: SURGERY

## 2022-10-12 PROCEDURE — 3044F HG A1C LEVEL LT 7.0%: CPT | Mod: CPTII,S$GLB,, | Performed by: SURGERY

## 2022-10-12 PROCEDURE — 3077F PR MOST RECENT SYSTOLIC BLOOD PRESSURE >= 140 MM HG: ICD-10-PCS | Mod: CPTII,S$GLB,, | Performed by: SURGERY

## 2022-10-12 PROCEDURE — 1159F PR MEDICATION LIST DOCUMENTED IN MEDICAL RECORD: ICD-10-PCS | Mod: CPTII,S$GLB,, | Performed by: SURGERY

## 2022-10-12 PROCEDURE — 3077F SYST BP >= 140 MM HG: CPT | Mod: CPTII,S$GLB,, | Performed by: SURGERY

## 2022-10-12 PROCEDURE — 3008F BODY MASS INDEX DOCD: CPT | Mod: CPTII,S$GLB,, | Performed by: SURGERY

## 2022-10-12 PROCEDURE — 3008F PR BODY MASS INDEX (BMI) DOCUMENTED: ICD-10-PCS | Mod: CPTII,S$GLB,, | Performed by: SURGERY

## 2022-10-12 PROCEDURE — 1160F RVW MEDS BY RX/DR IN RCRD: CPT | Mod: CPTII,S$GLB,, | Performed by: SURGERY

## 2022-10-12 PROCEDURE — 3044F PR MOST RECENT HEMOGLOBIN A1C LEVEL <7.0%: ICD-10-PCS | Mod: CPTII,S$GLB,, | Performed by: SURGERY

## 2022-10-12 PROCEDURE — 99203 OFFICE O/P NEW LOW 30 MIN: CPT | Mod: S$GLB,,, | Performed by: SURGERY

## 2022-10-12 PROCEDURE — 99999 PR PBB SHADOW E&M-EST. PATIENT-LVL IV: ICD-10-PCS | Mod: PBBFAC,,, | Performed by: SURGERY

## 2022-10-12 PROCEDURE — 1159F MED LIST DOCD IN RCRD: CPT | Mod: CPTII,S$GLB,, | Performed by: SURGERY

## 2022-10-12 PROCEDURE — 3079F DIAST BP 80-89 MM HG: CPT | Mod: CPTII,S$GLB,, | Performed by: SURGERY

## 2022-10-12 PROCEDURE — 3079F PR MOST RECENT DIASTOLIC BLOOD PRESSURE 80-89 MM HG: ICD-10-PCS | Mod: CPTII,S$GLB,, | Performed by: SURGERY

## 2022-10-12 PROCEDURE — 1160F PR REVIEW ALL MEDS BY PRESCRIBER/CLIN PHARMACIST DOCUMENTED: ICD-10-PCS | Mod: CPTII,S$GLB,, | Performed by: SURGERY

## 2022-10-12 NOTE — PROGRESS NOTES
History & Physical    SUBJECTIVE:     History of Present Illness:  Patient is a 41 year-old morbidly obese woman with PCOS who presents for evaluation of a right flank soft tissue mass. She noticed this a few months ago and feels it has slowly been increasing in size. It is overall asymptomatic although is tender to direct pressure such as laying directly on it. She denies numbness, tingling, skin changes, drainage, or h/o infection. She would like to have it removed.    Chief Complaint   Patient presents with    Mass     R flank subcutaneous     Review of patient's allergies indicates:   Allergen Reactions    Penicillins Anaphylaxis     Current Outpatient Medications   Medication Sig Dispense Refill    brimonidine 0.2% (ALPHAGAN) 0.2 % Drop Place 1 drop into both eyes 3 (three) times daily. 10 mL 3    difluprednate (DUREZOL) 0.05 % Drop ophthalmic solution Place 1 drop into the left eye 2 (two) times daily. 5 mL 0    dorzolamide-timolol 2-0.5% (COSOPT) 22.3-6.8 mg/mL ophthalmic solution Place 1 drop into both eyes 2 (two) times daily. 10 mL 3    famotidine (PEPCID) 20 MG tablet Take one tablet by mouth daily 90 tablet 1    latanoprost 0.005 % ophthalmic solution INSTILL 1 DROP INTO EACH EYE ONCE DAILY 3 mL 0     No current facility-administered medications for this visit.     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, moderate stage 5/27/2021    History of hysteroscopy     Infertility associated with anovulation     Nuclear cataract 1/12/2017    PCOS (polycystic ovarian syndrome)     PONV (postoperative nausea and vomiting)      Past Surgical History:   Procedure Laterality Date    ABDOMINAL SURGERY      BREAST BIOPSY      CATARACT EXTRACTION W/  INTRAOCULAR LENS IMPLANT Left 8/5/2021    Procedure: EXTRACTION, CATARACT, WITH IOL INSERTION;  Surgeon: Elvia Sharp MD;  Location: [facility];  Service: Ophthalmology;   "Laterality: Left;  LASER Catalys     CHOLECYSTECTOMY      CORNEAL TRANSPLANT Left 1/21/16    Dr Sharp    CORNEAL TRANSPLANT Right 01/12/2017    Dr. Sharp    DILATION AND CURETTAGE OF UTERUS      gallbaldder N/A 04/2015    HERNIA REPAIR      HIATAL    HIATAL HERNIA REPAIR  2015    IVF      MYOECTOMY      SLEEVE GASTROPLASTY      STOMACH SURGERY  Gastric sleeve     Family History   Problem Relation Age of Onset    Breast cancer Maternal Grandmother     Diabetes Mother     Hypertension Mother     Glaucoma Mother     Breast cancer Maternal Aunt     Breast cancer Paternal Aunt     No Known Problems Father     No Known Problems Sister     No Known Problems Brother     No Known Problems Maternal Uncle     No Known Problems Paternal Uncle     No Known Problems Paternal Grandmother     No Known Problems Paternal Grandfather     Breast cancer Maternal Aunt     Breast cancer Maternal Aunt     Breast cancer Maternal Aunt     Breast cancer Maternal Aunt     Amblyopia Neg Hx     Blindness Neg Hx     Cancer Neg Hx     Cataracts Neg Hx     Macular degeneration Neg Hx     Retinal detachment Neg Hx     Strabismus Neg Hx     Stroke Neg Hx     Thyroid disease Neg Hx     Colon cancer Neg Hx     Ovarian cancer Neg Hx      Social History     Tobacco Use    Smoking status: Never    Smokeless tobacco: Never   Substance Use Topics    Alcohol use: Not Currently    Drug use: No      Review of Systems:  Review of Systems   Constitutional: Negative.    HENT: Negative.     Eyes: Negative.    Respiratory: Negative.     Cardiovascular: Negative.    Gastrointestinal: Negative.    Endocrine: Negative.    Genitourinary: Negative.    Musculoskeletal: Negative.    Skin: Negative.    Allergic/Immunologic: Negative.    Neurological: Negative.    Hematological: Negative.    Psychiatric/Behavioral: Negative.       OBJECTIVE:     Vital Signs (Most Recent)  Temp: 98.7 °F (37.1 °C) (10/12/22 1407)  Pulse: 83 (10/12/22 1407)  BP: (!) 185/87 (10/12/22 1407)  5' 2" " (1.575 m)  (!) 139.6 kg (307 lb 12.2 oz)     Physical Exam:  Physical Exam  Vitals reviewed.   Constitutional:       General: She is not in acute distress.     Appearance: Normal appearance. She is well-developed. She is obese. She is not ill-appearing.   HENT:      Head: Normocephalic and atraumatic.   Eyes:      General: No scleral icterus.     Conjunctiva/sclera: Conjunctivae normal.   Cardiovascular:      Rate and Rhythm: Normal rate and regular rhythm.   Pulmonary:      Effort: Pulmonary effort is normal. No respiratory distress.   Abdominal:      General: There is no distension.      Palpations: Abdomen is soft. There is mass (R flank soft, mobile, rubbery subcutaneous mass approx 7 x 5 cm c/w lipoma).      Tenderness: There is no abdominal tenderness. There is no guarding.   Musculoskeletal:         General: Normal range of motion.      Cervical back: Normal range of motion and neck supple.   Skin:     General: Skin is warm and dry.      Findings: No erythema.   Neurological:      General: No focal deficit present.      Mental Status: She is alert and oriented to person, place, and time.   Psychiatric:         Mood and Affect: Mood normal.         Behavior: Behavior normal.     ASSESSMENT/PLAN:   Patient is a 41 year-old woman who presents with a R flank lipoma. We discussed the risks, benefits and alternatives to lipoma excision under local, the risks including but not limited to bleeding, infection, scarring, pain, numbness, and recurrence. All questions were answered to her satisfaction and she has elected to proceed with removal under local in the minor procedure room. Informed consent was obtained. It was a pleasure meeting Ms. Chung and thank you for this consultation.    Sonja Lang  10/12/2022

## 2022-10-12 NOTE — PROGRESS NOTES
General Surgery Office Visit   History and Physical    Patient Name: Tia Chung  YOB: 1980 (41 y.o.)  MRN: [id number]  Today's Date: 10/12/2022    Referring Md:   Sapna Vásquez Md  [address] [facility]  [place] [id number]    SUBJECTIVE:     Chief Complaint: Mass of R flank    History of Present Illness:  Tia Chung is a 41 y.o. female with PMHx of PCOS, keratoconus s/p corneal transplant, gastric sleeve. She reports she has had a mass of R flank for the past 2-3 months. Has been tender if she lays on her side. She denies fever, chills, unintentional weight loss, n/v/d, constipation, hematochezia, dysuria, hematuria, CP, SOB, and all other symptoms. Patient reports being compliant with home medication regimen.     Patient denies personal history of MI, CVA, lung disease, DM  Previous abdominal surgeries include: Gastric sleeve  Denies alcohol, tobacco, and elicit drug use.   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, moderate stage 5/27/2021    History of hysteroscopy     Infertility associated with anovulation     Nuclear cataract 1/12/2017    PCOS (polycystic ovarian syndrome)     PONV (postoperative nausea and vomiting)      Past Surgical History:   Procedure Laterality Date    ABDOMINAL SURGERY      BREAST BIOPSY      CATARACT EXTRACTION W/  INTRAOCULAR LENS IMPLANT Left 8/5/2021    Procedure: EXTRACTION, CATARACT, WITH IOL INSERTION;  Surgeon: Elvia Sharp MD;  Location: [facility];  Service: Ophthalmology;  Laterality: Left;  LASER Catalys     CHOLECYSTECTOMY      CORNEAL TRANSPLANT Left 1/21/16    Dr Sharp    CORNEAL TRANSPLANT Right 01/12/2017    Dr. Sharp    DILATION AND CURETTAGE OF UTERUS      juanjose N/A 04/2015    HERNIA REPAIR       HIATAL    HIATAL HERNIA REPAIR  2015    IVF      MYOECTOMY      SLEEVE GASTROPLASTY      STOMACH SURGERY  Gastric sleeve     Family History   Problem Relation Age of Onset    Breast cancer Maternal Grandmother     Diabetes Mother     Hypertension Mother     Glaucoma Mother     Breast cancer Maternal Aunt     Breast cancer Paternal Aunt     No Known Problems Father     No Known Problems Sister     No Known Problems Brother     No Known Problems Maternal Uncle     No Known Problems Paternal Uncle     No Known Problems Paternal Grandmother     No Known Problems Paternal Grandfather     Breast cancer Maternal Aunt     Breast cancer Maternal Aunt     Breast cancer Maternal Aunt     Breast cancer Maternal Aunt     Amblyopia Neg Hx     Blindness Neg Hx     Cancer Neg Hx     Cataracts Neg Hx     Macular degeneration Neg Hx     Retinal detachment Neg Hx     Strabismus Neg Hx     Stroke Neg Hx     Thyroid disease Neg Hx     Colon cancer Neg Hx     Ovarian cancer Neg Hx      Social History     Tobacco Use    Smoking status: Never    Smokeless tobacco: Never   Substance Use Topics    Alcohol use: Not Currently    Drug use: No        Review of Systems:  Review of Systems   Constitutional:  Negative for chills, fever, malaise/fatigue and weight loss.   HENT: Negative.     Eyes: Negative.    Respiratory:  Negative for cough and shortness of breath.    Cardiovascular:  Negative for chest pain, orthopnea and claudication.   Gastrointestinal: Negative.    Skin: Negative.      OBJECTIVE:     Vital Signs (Most Recent)  There were no vitals taken for this visit.    Physical Exam  Vitals reviewed.   Constitutional:       General: She is not in acute distress.     Appearance: Normal appearance.   HENT:      Head: Normocephalic and atraumatic.      Nose: Nose normal.      Mouth/Throat:      Mouth: Mucous membranes are moist.   Cardiovascular:      Rate and Rhythm: Normal rate and regular rhythm.   Pulmonary:      Effort: Pulmonary effort  is normal. No respiratory distress.   Abdominal:      General: Abdomen is flat. There is no distension.      Palpations: Abdomen is soft.      Tenderness: There is no abdominal tenderness.   Musculoskeletal:         General: Normal range of motion.   Skin:     General: Skin is warm.      Comments: Right flank/lateral chest wall with 6cm soft, mobile, rubbery mass consistent with lipoma   Neurological:      General: No focal deficit present.      Mental Status: She is alert.   Psychiatric:         Mood and Affect: Mood normal.       Labs:     Lab Results   Component Value Date    WBC 13.17 (H) 08/02/2022    HGB 12.4 08/02/2022    HCT 38.8 08/02/2022    MCV 83 08/02/2022     08/02/2022         CMP  Sodium   Date Value Ref Range Status   02/01/2022 139 136 - 145 mmol/L Final     Potassium   Date Value Ref Range Status   02/01/2022 4.3 3.5 - 5.1 mmol/L Final     Chloride   Date Value Ref Range Status   02/01/2022 107 95 - 110 mmol/L Final     CO2   Date Value Ref Range Status   02/01/2022 25 23 - 29 mmol/L Final     Glucose   Date Value Ref Range Status   02/01/2022 89 70 - 110 mg/dL Final     BUN   Date Value Ref Range Status   02/01/2022 13 6 - 20 mg/dL Final     Creatinine   Date Value Ref Range Status   02/01/2022 0.7 0.5 - 1.4 mg/dL Final     Calcium   Date Value Ref Range Status   02/01/2022 9.1 8.7 - 10.5 mg/dL Final     Total Protein   Date Value Ref Range Status   02/01/2022 6.7 6.0 - 8.4 g/dL Final     Albumin   Date Value Ref Range Status   02/01/2022 3.3 (L) 3.5 - 5.2 g/dL Final     Total Bilirubin   Date Value Ref Range Status   02/01/2022 0.5 0.1 - 1.0 mg/dL Final     Comment:     For infants and newborns, interpretation of results should be based  on gestational age, weight and in agreement with clinical  observations.    Premature Infant recommended reference ranges:  Up to 24 hours.............<8.0 mg/dL  Up to 48 hours............<12.0 mg/dL  3-5 days..................<15.0 mg/dL  6-29  days.................<15.0 mg/dL       Alkaline Phosphatase   Date Value Ref Range Status   02/01/2022 74 55 - 135 U/L Final     AST   Date Value Ref Range Status   02/01/2022 13 10 - 40 U/L Final     ALT   Date Value Ref Range Status   02/01/2022 12 10 - 44 U/L Final     Anion Gap   Date Value Ref Range Status   02/01/2022 7 (L) 8 - 16 mmol/L Final     eGFR if    Date Value Ref Range Status   02/01/2022 >60.0 >60 mL/min/1.73 m^2 Final     eGFR if non    Date Value Ref Range Status   02/01/2022 >60.0 >60 mL/min/1.73 m^2 Final     Comment:     Calculation used to obtain the estimated glomerular filtration  rate (eGFR) is the CKD-EPI equation.              Imaging:   US 2/10/2022   FINDINGS:  Focused ultrasound of the right lower back was performed in the area of clinical concern.  There is a 7.0 x 3.5 x 1.9 cm well-circumscribed hypoechoic lesion with thin internal septations centered in the subcutaneous fat.      ASSESSMENT/PLAN:       Tia Chung is a 41 y.o. female with a right flank lipoma      - plan for removal of lipoma  - consent obtained  - Patient instructed to call clinic with any questions, concerns, or new symptoms  - Patient understands and in agreement with plan; all questions answered

## 2022-10-17 ENCOUNTER — OFFICE VISIT (OUTPATIENT)
Dept: INTERNAL MEDICINE | Facility: CLINIC | Age: 42
End: 2022-10-17
Payer: COMMERCIAL

## 2022-10-17 VITALS
RESPIRATION RATE: 20 BRPM | SYSTOLIC BLOOD PRESSURE: 138 MMHG | HEART RATE: 74 BPM | DIASTOLIC BLOOD PRESSURE: 100 MMHG | BODY MASS INDEX: 53.92 KG/M2 | HEIGHT: 62 IN | WEIGHT: 293 LBS | TEMPERATURE: 97 F | OXYGEN SATURATION: 99 %

## 2022-10-17 DIAGNOSIS — J06.9 BACTERIAL URI: Primary | ICD-10-CM

## 2022-10-17 DIAGNOSIS — B96.89 BACTERIAL URI: Primary | ICD-10-CM

## 2022-10-17 DIAGNOSIS — R05.9 COUGH, UNSPECIFIED TYPE: ICD-10-CM

## 2022-10-17 DIAGNOSIS — R03.0 ELEVATED BP WITHOUT DIAGNOSIS OF HYPERTENSION: ICD-10-CM

## 2022-10-17 LAB
INFLUENZA A, MOLECULAR: NEGATIVE
INFLUENZA B, MOLECULAR: NEGATIVE
SARS-COV-2 RNA RESP QL NAA+PROBE: NOT DETECTED
SARS-COV-2- CYCLE NUMBER: NORMAL
SPECIMEN SOURCE: NORMAL

## 2022-10-17 PROCEDURE — 3044F HG A1C LEVEL LT 7.0%: CPT | Mod: CPTII,S$GLB,, | Performed by: HOSPITALIST

## 2022-10-17 PROCEDURE — 99214 PR OFFICE/OUTPT VISIT, EST, LEVL IV, 30-39 MIN: ICD-10-PCS | Mod: S$GLB,,, | Performed by: HOSPITALIST

## 2022-10-17 PROCEDURE — 3044F PR MOST RECENT HEMOGLOBIN A1C LEVEL <7.0%: ICD-10-PCS | Mod: CPTII,S$GLB,, | Performed by: HOSPITALIST

## 2022-10-17 PROCEDURE — U0005 INFEC AGEN DETEC AMPLI PROBE: HCPCS | Performed by: HOSPITALIST

## 2022-10-17 PROCEDURE — 3075F PR MOST RECENT SYSTOLIC BLOOD PRESS GE 130-139MM HG: ICD-10-PCS | Mod: CPTII,S$GLB,, | Performed by: HOSPITALIST

## 2022-10-17 PROCEDURE — U0003 INFECTIOUS AGENT DETECTION BY NUCLEIC ACID (DNA OR RNA); SEVERE ACUTE RESPIRATORY SYNDROME CORONAVIRUS 2 (SARS-COV-2) (CORONAVIRUS DISEASE [COVID-19]), AMPLIFIED PROBE TECHNIQUE, MAKING USE OF HIGH THROUGHPUT TECHNOLOGIES AS DESCRIBED BY CMS-2020-01-R: HCPCS | Performed by: HOSPITALIST

## 2022-10-17 PROCEDURE — 3080F PR MOST RECENT DIASTOLIC BLOOD PRESSURE >= 90 MM HG: ICD-10-PCS | Mod: CPTII,S$GLB,, | Performed by: HOSPITALIST

## 2022-10-17 PROCEDURE — 1160F PR REVIEW ALL MEDS BY PRESCRIBER/CLIN PHARMACIST DOCUMENTED: ICD-10-PCS | Mod: CPTII,S$GLB,, | Performed by: HOSPITALIST

## 2022-10-17 PROCEDURE — 1159F MED LIST DOCD IN RCRD: CPT | Mod: CPTII,S$GLB,, | Performed by: HOSPITALIST

## 2022-10-17 PROCEDURE — 3080F DIAST BP >= 90 MM HG: CPT | Mod: CPTII,S$GLB,, | Performed by: HOSPITALIST

## 2022-10-17 PROCEDURE — 3075F SYST BP GE 130 - 139MM HG: CPT | Mod: CPTII,S$GLB,, | Performed by: HOSPITALIST

## 2022-10-17 PROCEDURE — 99214 OFFICE O/P EST MOD 30 MIN: CPT | Mod: S$GLB,,, | Performed by: HOSPITALIST

## 2022-10-17 PROCEDURE — 87502 INFLUENZA DNA AMP PROBE: CPT | Mod: PO | Performed by: HOSPITALIST

## 2022-10-17 PROCEDURE — 99999 PR PBB SHADOW E&M-EST. PATIENT-LVL IV: ICD-10-PCS | Mod: PBBFAC,,, | Performed by: HOSPITALIST

## 2022-10-17 PROCEDURE — 1159F PR MEDICATION LIST DOCUMENTED IN MEDICAL RECORD: ICD-10-PCS | Mod: CPTII,S$GLB,, | Performed by: HOSPITALIST

## 2022-10-17 PROCEDURE — 99999 PR PBB SHADOW E&M-EST. PATIENT-LVL IV: CPT | Mod: PBBFAC,,, | Performed by: HOSPITALIST

## 2022-10-17 PROCEDURE — 1160F RVW MEDS BY RX/DR IN RCRD: CPT | Mod: CPTII,S$GLB,, | Performed by: HOSPITALIST

## 2022-10-17 RX ORDER — PROMETHAZINE HYDROCHLORIDE AND DEXTROMETHORPHAN HYDROBROMIDE 6.25; 15 MG/5ML; MG/5ML
5 SYRUP ORAL NIGHTLY PRN
Qty: 118 ML | Refills: 0 | Status: SHIPPED | OUTPATIENT
Start: 2022-10-17 | End: 2022-10-27

## 2022-10-17 RX ORDER — AZITHROMYCIN 250 MG/1
TABLET, FILM COATED ORAL
Qty: 6 TABLET | Refills: 0 | Status: SHIPPED | OUTPATIENT
Start: 2022-10-17 | End: 2022-10-22

## 2022-10-17 RX ORDER — BENZONATATE 100 MG/1
100 CAPSULE ORAL 3 TIMES DAILY PRN
Qty: 30 CAPSULE | Refills: 0 | Status: SHIPPED | OUTPATIENT
Start: 2022-10-17 | End: 2022-10-27

## 2022-10-17 NOTE — PROGRESS NOTES
"Subjective:     @Patient ID: Tia Chung is a 41 y.o. female.    Chief Complaint: Follow-up    HPI    41-year-old female presents for blood pressure check and evaluation of cough and congestion x1 week.  Patient reports that she traveled to [place] a week and a half ago.  States that since then she has been having cough congestion old, sore throat, postnasal drip and runny nose.  Denies any fevers chills.  Endorses some nausea.  Otherwise denies sick contacts.  Reports that she is taking home COVID test and they have been negative.  Last COVID test was done yesterday.  It was negative.  Reports that she has been taking even Mucinex and decongestants.  Believes that the decongestant has raised her blood pressure.    Review of Systems   Constitutional:  Negative for chills and fever.   HENT:  Positive for congestion, postnasal drip, rhinorrhea and sore throat.    Respiratory:  Positive for cough and wheezing.    Psychiatric/Behavioral:  Negative for agitation and confusion.    Past medical history, surgical history, and family medical history reviewed and updated as appropriate.    Medications and allergies reviewed.     Objective:     Vitals:    10/17/22 0818 10/17/22 0846   BP: (!) 146/96 (!) 138/100   BP Location: Right arm    Patient Position: Sitting    BP Method: X-Large (Manual)    Pulse: 74    Resp: 20    Temp: 97.2 °F (36.2 °C)    TempSrc: Temporal    SpO2: 99%    Weight: (!) 138.4 kg (305 lb 1.9 oz)    Height: 5' 2" (1.575 m)      Body mass index is 55.81 kg/m².  Physical Exam  Constitutional:       Appearance: Normal appearance.   HENT:      Head: Normocephalic and atraumatic.      Right Ear: Tympanic membrane normal.      Left Ear: Tympanic membrane normal.      Mouth/Throat:      Mouth: Mucous membranes are moist.      Pharynx: No oropharyngeal exudate.   Eyes:      General:         Right eye: No discharge.         Left eye: No discharge.      Conjunctiva/sclera: Conjunctivae normal. "   Cardiovascular:      Rate and Rhythm: Normal rate and regular rhythm.      Heart sounds: No murmur heard.  Pulmonary:      Effort: Pulmonary effort is normal.      Breath sounds: Normal breath sounds.   Musculoskeletal:         General: Normal range of motion.      Cervical back: Normal range of motion and neck supple.   Skin:     General: Skin is warm and dry.   Neurological:      Mental Status: She is alert and oriented to person, place, and time.   Psychiatric:         Mood and Affect: Mood normal.         Behavior: Behavior normal.       Lab Results   Component Value Date    WBC 13.17 (H) 08/02/2022    HGB 12.4 08/02/2022    HCT 38.8 08/02/2022     08/02/2022    CHOL 217 (H) 02/01/2022    TRIG 68 02/01/2022    HDL 69 02/01/2022    ALT 12 02/01/2022    AST 13 02/01/2022     02/01/2022    K 4.3 02/01/2022     02/01/2022    CREATININE 0.7 02/01/2022    BUN 13 02/01/2022    CO2 25 02/01/2022    TSH 1.900 07/11/2022    GLUF 90 09/23/2021    HGBA1C 5.3 02/01/2022       Assessment:     1. Bacterial URI    2. Cough, unspecified type    3. Elevated BP without diagnosis of hypertension      Plan:   Tia was seen today for follow-up.    Diagnoses and all orders for this visit:    Bacterial URI  - recommend trial of Claritin or Zyrtec.  Additionally avoid decongestants due to blood pressure.  Will also treat with antibiotic and cough medication.  Will also check for flu and COVID  -     azithromycin (Z-DAVIE) 250 MG tablet; Take 2 tablets by mouth on day 1; Take 1 tablet by mouth on days 2-5  -     benzonatate (TESSALON) 100 MG capsule; Take 1 capsule (100 mg total) by mouth 3 (three) times daily as needed for Cough.  -     promethazine-dextromethorphan (PROMETHAZINE-DM) 6.25-15 mg/5 mL Syrp; Take 5 mLs by mouth nightly as needed (cough).    Cough, unspecified type  -     benzonatate (TESSALON) 100 MG capsule; Take 1 capsule (100 mg total) by mouth 3 (three) times daily as needed for Cough.  -      promethazine-dextromethorphan (PROMETHAZINE-DM) 6.25-15 mg/5 mL Syrp; Take 5 mLs by mouth nightly as needed (cough).  -     COVID-19 Routine Screening  -     Influenza A & B by Molecular    Elevated BP without diagnosis of hypertension    Blood pressure is elevated today.  Recommend to stay not take decongestants.  Recommend to continue blood pressure log.  Will return to clinic in 2 weeks    No follow-ups on file.    Sapna Vásquez MD  Internal Medicine    10/17/2022

## 2022-10-25 ENCOUNTER — PATIENT MESSAGE (OUTPATIENT)
Dept: SURGERY | Facility: CLINIC | Age: 42
End: 2022-10-25
Payer: COMMERCIAL

## 2022-10-26 ENCOUNTER — IMMUNIZATION (OUTPATIENT)
Dept: PRIMARY CARE CLINIC | Facility: CLINIC | Age: 42
End: 2022-10-26
Payer: COMMERCIAL

## 2022-10-26 DIAGNOSIS — Z23 NEED FOR VACCINATION: Primary | ICD-10-CM

## 2022-10-26 PROCEDURE — 91313 COVID-19, MRNA, LNP-S, BIVALENT BOOSTER, PF, 50 MCG/0.5 ML: CPT | Mod: PBBFAC | Performed by: INTERNAL MEDICINE

## 2022-10-26 PROCEDURE — 0134A COVID-19, MRNA, LNP-S, BIVALENT BOOSTER, PF, 50 MCG/0.5 ML: CPT | Mod: CV19,PBBFAC | Performed by: INTERNAL MEDICINE

## 2022-10-31 ENCOUNTER — OFFICE VISIT (OUTPATIENT)
Dept: INTERNAL MEDICINE | Facility: CLINIC | Age: 42
End: 2022-10-31
Payer: COMMERCIAL

## 2022-10-31 VITALS
HEIGHT: 63 IN | DIASTOLIC BLOOD PRESSURE: 110 MMHG | OXYGEN SATURATION: 98 % | RESPIRATION RATE: 16 BRPM | SYSTOLIC BLOOD PRESSURE: 150 MMHG | HEART RATE: 74 BPM | WEIGHT: 293 LBS | BODY MASS INDEX: 51.91 KG/M2 | TEMPERATURE: 98 F

## 2022-10-31 DIAGNOSIS — Z71.84 TRAVEL ADVICE ENCOUNTER: ICD-10-CM

## 2022-10-31 DIAGNOSIS — I10 PRIMARY HYPERTENSION: Primary | ICD-10-CM

## 2022-10-31 PROCEDURE — 1159F MED LIST DOCD IN RCRD: CPT | Mod: CPTII,S$GLB,, | Performed by: HOSPITALIST

## 2022-10-31 PROCEDURE — 3077F SYST BP >= 140 MM HG: CPT | Mod: CPTII,S$GLB,, | Performed by: HOSPITALIST

## 2022-10-31 PROCEDURE — 90471 IMMUNIZATION ADMIN: CPT | Mod: S$GLB,,, | Performed by: HOSPITALIST

## 2022-10-31 PROCEDURE — 90471 FLU VACCINE (QUAD) GREATER THAN OR EQUAL TO 3YO PRESERVATIVE FREE IM: ICD-10-PCS | Mod: S$GLB,,, | Performed by: HOSPITALIST

## 2022-10-31 PROCEDURE — 99999 PR PBB SHADOW E&M-EST. PATIENT-LVL IV: ICD-10-PCS | Mod: PBBFAC,,, | Performed by: HOSPITALIST

## 2022-10-31 PROCEDURE — 3077F PR MOST RECENT SYSTOLIC BLOOD PRESSURE >= 140 MM HG: ICD-10-PCS | Mod: CPTII,S$GLB,, | Performed by: HOSPITALIST

## 2022-10-31 PROCEDURE — 90686 IIV4 VACC NO PRSV 0.5 ML IM: CPT | Mod: S$GLB,,, | Performed by: HOSPITALIST

## 2022-10-31 PROCEDURE — 3044F HG A1C LEVEL LT 7.0%: CPT | Mod: CPTII,S$GLB,, | Performed by: HOSPITALIST

## 2022-10-31 PROCEDURE — 1160F RVW MEDS BY RX/DR IN RCRD: CPT | Mod: CPTII,S$GLB,, | Performed by: HOSPITALIST

## 2022-10-31 PROCEDURE — 1159F PR MEDICATION LIST DOCUMENTED IN MEDICAL RECORD: ICD-10-PCS | Mod: CPTII,S$GLB,, | Performed by: HOSPITALIST

## 2022-10-31 PROCEDURE — 99214 OFFICE O/P EST MOD 30 MIN: CPT | Mod: 25,S$GLB,, | Performed by: HOSPITALIST

## 2022-10-31 PROCEDURE — 3080F PR MOST RECENT DIASTOLIC BLOOD PRESSURE >= 90 MM HG: ICD-10-PCS | Mod: CPTII,S$GLB,, | Performed by: HOSPITALIST

## 2022-10-31 PROCEDURE — 99214 PR OFFICE/OUTPT VISIT, EST, LEVL IV, 30-39 MIN: ICD-10-PCS | Mod: 25,S$GLB,, | Performed by: HOSPITALIST

## 2022-10-31 PROCEDURE — 99999 PR PBB SHADOW E&M-EST. PATIENT-LVL IV: CPT | Mod: PBBFAC,,, | Performed by: HOSPITALIST

## 2022-10-31 PROCEDURE — 3044F PR MOST RECENT HEMOGLOBIN A1C LEVEL <7.0%: ICD-10-PCS | Mod: CPTII,S$GLB,, | Performed by: HOSPITALIST

## 2022-10-31 PROCEDURE — 90686 FLU VACCINE (QUAD) GREATER THAN OR EQUAL TO 3YO PRESERVATIVE FREE IM: ICD-10-PCS | Mod: S$GLB,,, | Performed by: HOSPITALIST

## 2022-10-31 PROCEDURE — 1160F PR REVIEW ALL MEDS BY PRESCRIBER/CLIN PHARMACIST DOCUMENTED: ICD-10-PCS | Mod: CPTII,S$GLB,, | Performed by: HOSPITALIST

## 2022-10-31 PROCEDURE — 3080F DIAST BP >= 90 MM HG: CPT | Mod: CPTII,S$GLB,, | Performed by: HOSPITALIST

## 2022-10-31 RX ORDER — NIFEDIPINE 30 MG/1
30 TABLET, EXTENDED RELEASE ORAL DAILY
Qty: 30 TABLET | Refills: 11 | Status: ON HOLD | OUTPATIENT
Start: 2022-10-31 | End: 2022-12-02 | Stop reason: HOSPADM

## 2022-10-31 RX ORDER — SCOLOPAMINE TRANSDERMAL SYSTEM 1 MG/1
1 PATCH, EXTENDED RELEASE TRANSDERMAL
Qty: 5 PATCH | Refills: 0 | Status: SHIPPED | OUTPATIENT
Start: 2022-10-31 | End: 2023-05-29 | Stop reason: SDUPTHER

## 2022-10-31 NOTE — PROGRESS NOTES
"  Subjective:     @Patient ID: Tia Chung is a 41 y.o. female.    Chief Complaint: Follow-up    HPI    42 yo F presents for bp check. Reports at home SBP averaging 130s. Reports was rushing on her way here, not sure if cause of elevation  Also started low sodium diet recently    She also would like to have scopolamine patch for travel. She is going on a 14 day cruise soon       Review of Systems   Constitutional:  Negative for chills and fever.   HENT:  Negative for congestion and sore throat.    Respiratory:  Negative for cough and shortness of breath.    Cardiovascular:  Negative for chest pain and leg swelling.   Gastrointestinal:  Negative for abdominal pain, nausea and vomiting.   Genitourinary:  Negative for difficulty urinating and dysuria.   Musculoskeletal:  Negative for arthralgias and back pain.   Skin:  Negative for rash and wound.   Neurological:  Negative for dizziness, weakness and headaches.   Psychiatric/Behavioral:  Negative for agitation and confusion.    Past medical history, surgical history, and family medical history reviewed and updated as appropriate.    Medications and allergies reviewed.     Objective:     Vitals:    10/31/22 1324 10/31/22 1400   BP: (!) 140/80 (!) 150/110   BP Location: Right arm    Patient Position: Sitting    BP Method: Large (Manual)    Pulse: 74    Resp: 16    Temp: 97.7 °F (36.5 °C)    TempSrc: Temporal    SpO2: 98%    Weight: (!) 139.6 kg (307 lb 12.2 oz)    Height: 5' 2.5" (1.588 m)      Body mass index is 55.39 kg/m².  Physical Exam  Constitutional:       Appearance: Normal appearance.   HENT:      Head: Normocephalic and atraumatic.   Eyes:      General:         Right eye: No discharge.         Left eye: No discharge.      Conjunctiva/sclera: Conjunctivae normal.   Pulmonary:      Effort: Pulmonary effort is normal.   Musculoskeletal:         General: Normal range of motion.      Cervical back: Normal range of motion and neck supple.   Skin:     " General: Skin is warm and dry.   Neurological:      Mental Status: She is alert and oriented to person, place, and time.   Psychiatric:         Mood and Affect: Mood normal.         Behavior: Behavior normal.       Lab Results   Component Value Date    WBC 13.17 (H) 08/02/2022    HGB 12.4 08/02/2022    HCT 38.8 08/02/2022     08/02/2022    CHOL 217 (H) 02/01/2022    TRIG 68 02/01/2022    HDL 69 02/01/2022    ALT 12 02/01/2022    AST 13 02/01/2022     02/01/2022    K 4.3 02/01/2022     02/01/2022    CREATININE 0.7 02/01/2022    BUN 13 02/01/2022    CO2 25 02/01/2022    TSH 1.900 07/11/2022    GLUF 90 09/23/2021    HGBA1C 5.3 02/01/2022       Assessment:     1. Primary hypertension    2. Travel advice encounter      Plan:   Tia was seen today for follow-up.    Diagnoses and all orders for this visit:    Primary hypertension  - BP uncontrolled and not at goal. Will start nifedipine as pt is in process of fertility treatments and would like to get pregnant soon   - Counseled on low sodium diet, exercise    - DASH diet handout provided   - rtc in 1 month for bp check       Travel advice encounter  - flu shot today  - scopolamine patch for motion sickness     Other orders  -     NIFEdipine (PROCARDIA-XL) 30 MG (OSM) 24 hr tablet; Take 1 tablet (30 mg total) by mouth once daily.  -     scopolamine (TRANSDERM-SCOP) 1.3-1.5 mg (1 mg over 3 days); Place 1 patch onto the skin every 72 hours.  -     Influenza - Quadrivalent (PF)           Visit time 30 min. Includes pre-charting, face-to-face encounter, medical decision making and documentation.       Sapna Vásquez MD  Internal Medicine    10/31/2022

## 2022-11-21 ENCOUNTER — ANESTHESIA EVENT (OUTPATIENT)
Dept: SURGERY | Facility: HOSPITAL | Age: 42
End: 2022-11-21
Payer: COMMERCIAL

## 2022-11-21 ENCOUNTER — PATIENT MESSAGE (OUTPATIENT)
Dept: SURGERY | Facility: CLINIC | Age: 42
End: 2022-11-21
Payer: COMMERCIAL

## 2022-11-21 NOTE — ANESTHESIA PAT ROS NOTE
Chart review complete. Patient's medical history reviewed.  Patient not OK to proceed at [facility] due to BMI over 50.      Nelson Hogan MD   11/21/2022

## 2022-11-22 ENCOUNTER — OFFICE VISIT (OUTPATIENT)
Dept: INTERNAL MEDICINE | Facility: CLINIC | Age: 42
End: 2022-11-22
Payer: COMMERCIAL

## 2022-11-22 ENCOUNTER — TELEPHONE (OUTPATIENT)
Dept: SURGERY | Facility: CLINIC | Age: 42
End: 2022-11-22
Payer: COMMERCIAL

## 2022-11-22 VITALS
OXYGEN SATURATION: 98 % | DIASTOLIC BLOOD PRESSURE: 84 MMHG | SYSTOLIC BLOOD PRESSURE: 136 MMHG | TEMPERATURE: 97 F | HEIGHT: 63 IN | WEIGHT: 293 LBS | RESPIRATION RATE: 20 BRPM | HEART RATE: 78 BPM | BODY MASS INDEX: 51.91 KG/M2

## 2022-11-22 DIAGNOSIS — J32.9 CHRONIC SINUSITIS, UNSPECIFIED LOCATION: ICD-10-CM

## 2022-11-22 DIAGNOSIS — M79.671 RIGHT FOOT PAIN: ICD-10-CM

## 2022-11-22 DIAGNOSIS — I10 PRIMARY HYPERTENSION: Primary | ICD-10-CM

## 2022-11-22 DIAGNOSIS — Z12.31 ENCOUNTER FOR SCREENING MAMMOGRAM FOR BREAST CANCER: ICD-10-CM

## 2022-11-22 PROCEDURE — 1159F MED LIST DOCD IN RCRD: CPT | Mod: CPTII,S$GLB,, | Performed by: HOSPITALIST

## 2022-11-22 PROCEDURE — 1160F PR REVIEW ALL MEDS BY PRESCRIBER/CLIN PHARMACIST DOCUMENTED: ICD-10-PCS | Mod: CPTII,S$GLB,, | Performed by: HOSPITALIST

## 2022-11-22 PROCEDURE — 99999 PR PBB SHADOW E&M-EST. PATIENT-LVL IV: ICD-10-PCS | Mod: PBBFAC,,, | Performed by: HOSPITALIST

## 2022-11-22 PROCEDURE — 3079F PR MOST RECENT DIASTOLIC BLOOD PRESSURE 80-89 MM HG: ICD-10-PCS | Mod: CPTII,S$GLB,, | Performed by: HOSPITALIST

## 2022-11-22 PROCEDURE — 3008F BODY MASS INDEX DOCD: CPT | Mod: CPTII,S$GLB,, | Performed by: HOSPITALIST

## 2022-11-22 PROCEDURE — 3044F PR MOST RECENT HEMOGLOBIN A1C LEVEL <7.0%: ICD-10-PCS | Mod: CPTII,S$GLB,, | Performed by: HOSPITALIST

## 2022-11-22 PROCEDURE — 99214 OFFICE O/P EST MOD 30 MIN: CPT | Mod: S$GLB,,, | Performed by: HOSPITALIST

## 2022-11-22 PROCEDURE — 3075F PR MOST RECENT SYSTOLIC BLOOD PRESS GE 130-139MM HG: ICD-10-PCS | Mod: CPTII,S$GLB,, | Performed by: HOSPITALIST

## 2022-11-22 PROCEDURE — 3044F HG A1C LEVEL LT 7.0%: CPT | Mod: CPTII,S$GLB,, | Performed by: HOSPITALIST

## 2022-11-22 PROCEDURE — 3075F SYST BP GE 130 - 139MM HG: CPT | Mod: CPTII,S$GLB,, | Performed by: HOSPITALIST

## 2022-11-22 PROCEDURE — 3079F DIAST BP 80-89 MM HG: CPT | Mod: CPTII,S$GLB,, | Performed by: HOSPITALIST

## 2022-11-22 PROCEDURE — 99999 PR PBB SHADOW E&M-EST. PATIENT-LVL IV: CPT | Mod: PBBFAC,,, | Performed by: HOSPITALIST

## 2022-11-22 PROCEDURE — 3008F PR BODY MASS INDEX (BMI) DOCUMENTED: ICD-10-PCS | Mod: CPTII,S$GLB,, | Performed by: HOSPITALIST

## 2022-11-22 PROCEDURE — 1159F PR MEDICATION LIST DOCUMENTED IN MEDICAL RECORD: ICD-10-PCS | Mod: CPTII,S$GLB,, | Performed by: HOSPITALIST

## 2022-11-22 PROCEDURE — 1160F RVW MEDS BY RX/DR IN RCRD: CPT | Mod: CPTII,S$GLB,, | Performed by: HOSPITALIST

## 2022-11-22 PROCEDURE — 99214 PR OFFICE/OUTPT VISIT, EST, LEVL IV, 30-39 MIN: ICD-10-PCS | Mod: S$GLB,,, | Performed by: HOSPITALIST

## 2022-11-22 RX ORDER — MONTELUKAST SODIUM 10 MG/1
10 TABLET ORAL NIGHTLY
Qty: 30 TABLET | Refills: 11 | Status: SHIPPED | OUTPATIENT
Start: 2022-11-22 | End: 2022-12-22

## 2022-11-22 NOTE — TELEPHONE ENCOUNTER
Contacted pt to inform her that we will need to move her surgery case to main [place].  Pt ok with plan and understands that we will call with instructions and time the day before.

## 2022-11-22 NOTE — PROGRESS NOTES
"  Subjective:     @Patient ID: Tia Chung is a 41 y.o. female.    Chief Complaint: Follow-up    HPI    40 yo F with HTN presents for bp check. Last visit started on nifedipine. However pt reports she has not started the medication. Recently traveled on a cruise but has not checked bp since on vacation. Also reports having sinusitis and would like to start medication. Additionally reports having R plantar surface foot pain and would like referral to podiatry    Review of Systems   Constitutional:  Negative for chills and fever.   HENT:  Positive for rhinorrhea. Negative for congestion and sore throat.    Eyes:  Negative for pain and visual disturbance.   Gastrointestinal:  Negative for abdominal pain, nausea and vomiting.   Musculoskeletal:         + foot pain   Neurological:  Negative for weakness and headaches.   Psychiatric/Behavioral:  Negative for agitation and confusion.    Past medical history, surgical history, and family medical history reviewed and updated as appropriate.    Medications and allergies reviewed.     Objective:     Vitals:    11/22/22 0823   BP: 136/84   BP Location: Left arm   Patient Position: Sitting   BP Method: Medium (Manual)   Pulse: 78   Resp: 20   Temp: 97.4 °F (36.3 °C)   TempSrc: Temporal   SpO2: 98%   Weight: (!) 140.9 kg (310 lb 10.1 oz)   Height: 5' 2.5" (1.588 m)     Body mass index is 55.91 kg/m².  Physical Exam  Constitutional:       Appearance: Normal appearance.   HENT:      Head: Normocephalic and atraumatic.   Eyes:      General:         Right eye: No discharge.         Left eye: No discharge.      Conjunctiva/sclera: Conjunctivae normal.   Pulmonary:      Effort: Pulmonary effort is normal.      Breath sounds: Normal breath sounds.   Musculoskeletal:         General: Normal range of motion.      Cervical back: Normal range of motion and neck supple.   Skin:     General: Skin is warm and dry.   Neurological:      Mental Status: She is alert and oriented to " person, place, and time.   Psychiatric:         Mood and Affect: Mood normal.         Behavior: Behavior normal.       Lab Results   Component Value Date    WBC 13.17 (H) 08/02/2022    HGB 12.4 08/02/2022    HCT 38.8 08/02/2022     08/02/2022    CHOL 217 (H) 02/01/2022    TRIG 68 02/01/2022    HDL 69 02/01/2022    ALT 12 02/01/2022    AST 13 02/01/2022     02/01/2022    K 4.3 02/01/2022     02/01/2022    CREATININE 0.7 02/01/2022    BUN 13 02/01/2022    CO2 25 02/01/2022    TSH 1.900 07/11/2022    GLUF 90 09/23/2021    HGBA1C 5.3 02/01/2022       Assessment:     1. Primary hypertension    2. Chronic sinusitis, unspecified location    3. Right foot pain    4. Encounter for screening mammogram for breast cancer      Plan:   Tia was seen today for follow-up.    Diagnoses and all orders for this visit:    Primary hypertension  BP improved today. However pt has not started nifedipine  Counseled to continue to monitor BP and start medication if bp becomes elevated again    Chronic Sinusitis, unspecified location  - chronic  - start singulair    Right foot pain  - New issue. Refer to podiatry   -     Ambulatory referral/consult to Podiatry; Future    Encounter for screening mammogram for breast cancer  -     Mammo Digital Screening Bilat w/ Sixto; Future    Other orders  -     montelukast (SINGULAIR) 10 mg tablet; Take 1 tablet (10 mg total) by mouth every evening.        No follow-ups on file.    Sapna Vásquez MD  Internal Medicine    11/22/2022

## 2022-11-25 ENCOUNTER — LAB VISIT (OUTPATIENT)
Dept: LAB | Facility: HOSPITAL | Age: 42
End: 2022-11-25
Payer: COMMERCIAL

## 2022-11-25 DIAGNOSIS — E28.9 DYSFUNCTION OVARIAN: Primary | ICD-10-CM

## 2022-11-25 LAB
ESTRADIOL SERPL-MCNC: 59 PG/ML
FSH SERPL-ACNC: 5.12 MIU/ML
HCG INTACT+B SERPL-ACNC: <2.4 MIU/ML
LH SERPL-ACNC: 3.1 MIU/ML
PROGEST SERPL-MCNC: 0.1 NG/ML
TSH SERPL DL<=0.005 MIU/L-ACNC: 2.25 UIU/ML (ref 0.4–4)

## 2022-11-25 PROCEDURE — 83001 ASSAY OF GONADOTROPIN (FSH): CPT

## 2022-11-25 PROCEDURE — 36415 COLL VENOUS BLD VENIPUNCTURE: CPT

## 2022-11-25 PROCEDURE — 83002 ASSAY OF GONADOTROPIN (LH): CPT

## 2022-11-25 PROCEDURE — 82670 ASSAY OF TOTAL ESTRADIOL: CPT

## 2022-11-25 PROCEDURE — 84144 ASSAY OF PROGESTERONE: CPT

## 2022-11-25 PROCEDURE — 84702 CHORIONIC GONADOTROPIN TEST: CPT

## 2022-11-25 PROCEDURE — 84443 ASSAY THYROID STIM HORMONE: CPT

## 2022-11-30 RX ORDER — CEFAZOLIN SODIUM/WATER 2 G/20 ML
2 SYRINGE (ML) INTRAVENOUS
Status: CANCELLED | OUTPATIENT
Start: 2022-11-30

## 2022-11-30 RX ORDER — SODIUM CHLORIDE 9 MG/ML
INJECTION, SOLUTION INTRAVENOUS CONTINUOUS
Status: CANCELLED | OUTPATIENT
Start: 2022-11-30

## 2022-12-01 ENCOUNTER — TELEPHONE (OUTPATIENT)
Dept: SURGERY | Facility: CLINIC | Age: 42
End: 2022-12-01
Payer: COMMERCIAL

## 2022-12-01 RX ORDER — PROGESTERONE 200 MG/1
200 CAPSULE ORAL 2 TIMES DAILY
COMMUNITY
Start: 2022-11-25

## 2022-12-01 RX ORDER — SODIUM CHLORIDE 0.9 % (FLUSH) 0.9 %
10 SYRINGE (ML) INJECTION
Status: ACTIVE | OUTPATIENT
Start: 2022-12-01

## 2022-12-01 RX ORDER — ESTRADIOL 2 MG/1
2 TABLET ORAL 2 TIMES DAILY
COMMUNITY
Start: 2022-11-25

## 2022-12-01 NOTE — PRE-PROCEDURE INSTRUCTIONS
"Preop instructions:      NPO instructions: NO solids foods,non-clear liquids including milk, milk products, creamers, gum, mints, or hard candy after midnight the night prior to your surgery or 8 hours prior to your SX start time.   We encourage a limited consumption of CLEAR LIQUIDS after midnight the night before your surgery up to 2 hrs prior to your SX start time.      Acceptable Clear Liquids are listed below:     Water, Gatorade/Powerade sports drinks, Apple juice (no pulp) Black coffee with without sugar/NO milk, cream or creamer or Jello.      If you have received specific instructions from your Surgeon/Surgeon's staff, please follow their instructions.      Showering instructions, directions, leave all valuables at home, medication instructions for PM prior & am of procedure explained. Patient stated an understanding.      Patient reports h/o "intraoperative vomiting without aspiration" Severe GERD   0830 ARRIVAL TIME TO DOSC   "

## 2022-12-01 NOTE — ANESTHESIA PREPROCEDURE EVALUATION
[facility]  Anesthesia Pre-Operative Evaluation         Patient Name: Tia Chung  YOB: 1980  MRN: [id number]    SUBJECTIVE:     Pre-operative evaluation for Procedure(s) (LRB):  EXCISION, MASS, RIGHT FLANK (Right)     2022    Tia Chung is a 41 y.o. female w/ a significant PMHx of morbid obesity, PCOS, HTN, PONV, intraop vomiting without aspiration who presents for the above procedure. Lipoma on right flank.      LDA: None documented.    Prev airway: None documented.     Drips: None documented.    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     Current Outpatient Medications on File Prior to Encounter   Medication Sig Dispense Refill    estradioL (ESTRACE) 2 MG tablet Take 2 mg by mouth 2 (two) times daily.      progesterone (PROMETRIUM) 200 MG capsule Take 200 mg by mouth 2 (two) times daily.      brimonidine 0.2% (ALPHAGAN) 0.2 % Drop Place 1 drop into both eyes 3 (three) times daily. 10 mL 3    difluprednate (DUREZOL) 0.05 % Drop ophthalmic solution Place 1 drop into the left eye 2 (two) times daily. 5 mL 0    dorzolamide-timolol 2-0.5% (COSOPT) 22.3-6.8 mg/mL ophthalmic solution Place 1 drop into both eyes 2 (two) times daily. 10 mL 3    famotidine (PEPCID) 20 MG tablet Take one tablet by mouth daily 90  tablet 1    latanoprost 0.005 % ophthalmic solution Place 1 drop into both eyes once daily. 3 mL 0       Past Surgical History:   Procedure Laterality Date    ABDOMINAL SURGERY      BREAST BIOPSY      CATARACT EXTRACTION W/  INTRAOCULAR LENS IMPLANT Left 8/5/2021    Procedure: EXTRACTION, CATARACT, WITH IOL INSERTION;  Surgeon: Elvia Sharp MD;  Location: [facility];  Service: Ophthalmology;  Laterality: Left;  LASER Catalys     CHOLECYSTECTOMY      CORNEAL TRANSPLANT Left 1/21/16    Dr Sharp    CORNEAL TRANSPLANT Right 01/12/2017    Dr. Sharp    DILATION AND CURETTAGE OF UTERUS      gallbaldder N/A 04/2015    HERNIA REPAIR      HIATAL    HIATAL HERNIA REPAIR  2015    IVF      MYOECTOMY      SLEEVE GASTROPLASTY      STOMACH SURGERY  Gastric sleeve       Social History     Socioeconomic History    Marital status: Single   Tobacco Use    Smoking status: Never    Smokeless tobacco: Never   Substance and Sexual Activity    Alcohol use: Not Currently    Drug use: No    Sexual activity: Yes     Partners: Female     Birth control/protection: None       OBJECTIVE:     Vital Signs Range (Last 24H):         CBC:   No results for input(s): WBC, RBC, HGB, HCT, PLT, MCV, MCH, MCHC in the last 72 hours.    CMP: No results for input(s): NA, K, CL, CO2, BUN, CREATININE, GLU, MG, PHOS, CALCIUM, ALBUMIN, PROT, ALKPHOS, ALT, AST, BILITOT in the last 72 hours.    INR:  No results for input(s): PT, INR, PROTIME, APTT in the last 72 hours.    Diagnostic Studies: No relevant studies.    EKG: No recent studies available.    2D ECHO:  No results found for this or any previous visit.      ASSESSMENT/PLAN:           Pre-op Assessment    I have reviewed the Patient Summary Reports.       I have reviewed the Medications.     Review of Systems  Anesthesia Hx:  No problems with previous Anesthesia  Personal Hx of Anesthesia complications, Post-Operative Nausea/Vomiting, with every anesthetic, treatment not known  "  Hematology/Oncology:  Hematology Normal   Oncology Normal     EENT/Dental:EENT/Dental Normal   Cardiovascular:   Hypertension    Pulmonary:  Pulmonary Normal    Renal/:  Renal/ Normal     Hepatic/GI:   GERD    Neurological:  Neurology Normal    Endocrine:  Endocrine Normal    Psych:  Psychiatric Normal           Physical Exam  General: Well nourished, Alert, Oriented and Cooperative    Airway:  Mallampati: III / II  Mouth Opening: Normal  TM Distance: 4 - 6 cm  Tongue: Normal  Neck ROM: Normal ROM    Chest/Lungs:  Normal Respiratory Rate    Heart:  Rate: Normal        Anesthesia Plan  Type of Anesthesia, risks & benefits discussed:    Anesthesia Type: Gen ETT, MAC  Intra-op Monitoring Plan: Standard ASA Monitors  Post Op Pain Control Plan: multimodal analgesia and IV/PO Opioids PRN  Induction:  IV  Airway Plan: Direct, Post-Induction  Informed Consent: Informed consent signed with the Patient and all parties understand the risks and agree with anesthesia plan.  All questions answered.   ASA Score: 3  Day of Surgery Review of History & Physical: H&P Update referred to the surgeon/provider.    Ready For Surgery From Anesthesia Perspective.     .  Patient reports h/o "intraoperative vomiting without aspiration" Severe GERD   Patient states she must be treated Pre-op for GERD    "

## 2022-12-02 ENCOUNTER — HOSPITAL ENCOUNTER (OUTPATIENT)
Facility: HOSPITAL | Age: 42
Discharge: HOME | End: 2022-12-02
Attending: SURGERY | Admitting: SURGERY
Payer: COMMERCIAL

## 2022-12-02 ENCOUNTER — ANESTHESIA (OUTPATIENT)
Dept: SURGERY | Facility: HOSPITAL | Age: 42
End: 2022-12-02
Payer: COMMERCIAL

## 2022-12-02 VITALS
WEIGHT: 293 LBS | HEIGHT: 63 IN | TEMPERATURE: 98 F | SYSTOLIC BLOOD PRESSURE: 128 MMHG | BODY MASS INDEX: 51.91 KG/M2 | DIASTOLIC BLOOD PRESSURE: 63 MMHG | HEART RATE: 60 BPM | RESPIRATION RATE: 20 BRPM | OXYGEN SATURATION: 96 %

## 2022-12-02 DIAGNOSIS — R19.00 RIGHT FLANK MASS: Primary | ICD-10-CM

## 2022-12-02 LAB
B-HCG UR QL: NEGATIVE
CTP QC/QA: YES

## 2022-12-02 PROCEDURE — D9220A PRA ANESTHESIA: ICD-10-PCS | Mod: ,,, | Performed by: ANESTHESIOLOGY

## 2022-12-02 PROCEDURE — D9220A PRA ANESTHESIA: Mod: ,,, | Performed by: ANESTHESIOLOGY

## 2022-12-02 PROCEDURE — 37000009 HC ANESTHESIA EA ADD 15 MINS: Performed by: SURGERY

## 2022-12-02 PROCEDURE — 25000003 PHARM REV CODE 250: Performed by: STUDENT IN AN ORGANIZED HEALTH CARE EDUCATION/TRAINING PROGRAM

## 2022-12-02 PROCEDURE — 25000003 PHARM REV CODE 250

## 2022-12-02 PROCEDURE — 71000015 HC POSTOP RECOV 1ST HR: Performed by: SURGERY

## 2022-12-02 PROCEDURE — 21931 EXC BACK LES SC 3 CM/>: CPT | Mod: ,,, | Performed by: SURGERY

## 2022-12-02 PROCEDURE — 81025 URINE PREGNANCY TEST: CPT | Performed by: SURGERY

## 2022-12-02 PROCEDURE — 71000044 HC DOSC ROUTINE RECOVERY FIRST HOUR: Performed by: SURGERY

## 2022-12-02 PROCEDURE — 63600175 PHARM REV CODE 636 W HCPCS: Performed by: STUDENT IN AN ORGANIZED HEALTH CARE EDUCATION/TRAINING PROGRAM

## 2022-12-02 PROCEDURE — 88304 TISSUE EXAM BY PATHOLOGIST: CPT | Mod: 26,,, | Performed by: STUDENT IN AN ORGANIZED HEALTH CARE EDUCATION/TRAINING PROGRAM

## 2022-12-02 PROCEDURE — 25000003 PHARM REV CODE 250: Performed by: SURGERY

## 2022-12-02 PROCEDURE — 71000016 HC POSTOP RECOV ADDL HR: Performed by: SURGERY

## 2022-12-02 PROCEDURE — 36000706: Performed by: SURGERY

## 2022-12-02 PROCEDURE — 37000008 HC ANESTHESIA 1ST 15 MINUTES: Performed by: SURGERY

## 2022-12-02 PROCEDURE — 36000707: Performed by: SURGERY

## 2022-12-02 PROCEDURE — 88304 PR  SURG PATH,LEVEL III: ICD-10-PCS | Mod: 26,,, | Performed by: STUDENT IN AN ORGANIZED HEALTH CARE EDUCATION/TRAINING PROGRAM

## 2022-12-02 PROCEDURE — 21931 PR EXCISION TUMOR SOFT TISSUE BACK/FLANK SUBQ 3+CM: ICD-10-PCS | Mod: ,,, | Performed by: SURGERY

## 2022-12-02 PROCEDURE — 88304 TISSUE EXAM BY PATHOLOGIST: CPT | Performed by: STUDENT IN AN ORGANIZED HEALTH CARE EDUCATION/TRAINING PROGRAM

## 2022-12-02 RX ORDER — HYDROMORPHONE HYDROCHLORIDE 1 MG/ML
INJECTION, SOLUTION INTRAMUSCULAR; INTRAVENOUS; SUBCUTANEOUS
Status: DISCONTINUED
Start: 2022-12-02 | End: 2022-12-02 | Stop reason: HOSPADM

## 2022-12-02 RX ORDER — SCOLOPAMINE TRANSDERMAL SYSTEM 1 MG/1
1 PATCH, EXTENDED RELEASE TRANSDERMAL
Status: ACTIVE | OUTPATIENT
Start: 2022-12-02

## 2022-12-02 RX ORDER — HALOPERIDOL 5 MG/ML
0.5 INJECTION INTRAMUSCULAR EVERY 10 MIN PRN
Status: ACTIVE | OUTPATIENT
Start: 2022-12-02

## 2022-12-02 RX ORDER — CLINDAMYCIN PHOSPHATE 900 MG/50ML
900 INJECTION, SOLUTION INTRAVENOUS
Status: COMPLETED | OUTPATIENT
Start: 2022-12-02 | End: 2022-12-02

## 2022-12-02 RX ORDER — MIDAZOLAM HYDROCHLORIDE 1 MG/ML
INJECTION INTRAMUSCULAR; INTRAVENOUS
Status: DISCONTINUED | OUTPATIENT
Start: 2022-12-02 | End: 2022-12-02

## 2022-12-02 RX ORDER — ONDANSETRON 2 MG/ML
INJECTION INTRAMUSCULAR; INTRAVENOUS
Status: DISCONTINUED | OUTPATIENT
Start: 2022-12-02 | End: 2022-12-02

## 2022-12-02 RX ORDER — HYDROMORPHONE HYDROCHLORIDE 1 MG/ML
0.2 INJECTION, SOLUTION INTRAMUSCULAR; INTRAVENOUS; SUBCUTANEOUS EVERY 5 MIN PRN
Status: ACTIVE | OUTPATIENT
Start: 2022-12-02

## 2022-12-02 RX ORDER — PROPOFOL 10 MG/ML
VIAL (ML) INTRAVENOUS CONTINUOUS PRN
Status: DISCONTINUED | OUTPATIENT
Start: 2022-12-02 | End: 2022-12-02

## 2022-12-02 RX ORDER — DEXMEDETOMIDINE HYDROCHLORIDE 100 UG/ML
INJECTION, SOLUTION INTRAVENOUS
Status: DISCONTINUED | OUTPATIENT
Start: 2022-12-02 | End: 2022-12-02

## 2022-12-02 RX ORDER — OXYCODONE HYDROCHLORIDE 5 MG/1
5 TABLET ORAL EVERY 6 HOURS PRN
Qty: 6 TABLET | Refills: 0 | Status: SHIPPED | OUTPATIENT
Start: 2022-12-02

## 2022-12-02 RX ORDER — SODIUM CHLORIDE 9 MG/ML
INJECTION, SOLUTION INTRAVENOUS CONTINUOUS
Status: DISCONTINUED | OUTPATIENT
Start: 2022-12-02 | End: 2022-12-02 | Stop reason: HOSPADM

## 2022-12-02 RX ORDER — LIDOCAINE HYDROCHLORIDE 10 MG/ML
INJECTION, SOLUTION EPIDURAL; INFILTRATION; INTRACAUDAL; PERINEURAL
Status: DISCONTINUED | OUTPATIENT
Start: 2022-12-02 | End: 2022-12-02 | Stop reason: HOSPADM

## 2022-12-02 RX ORDER — FENTANYL CITRATE 50 UG/ML
25 INJECTION, SOLUTION INTRAMUSCULAR; INTRAVENOUS EVERY 5 MIN PRN
Status: ACTIVE | OUTPATIENT
Start: 2022-12-02

## 2022-12-02 RX ORDER — LIDOCAINE HYDROCHLORIDE 20 MG/ML
INJECTION, SOLUTION EPIDURAL; INFILTRATION; INTRACAUDAL; PERINEURAL
Status: DISCONTINUED | OUTPATIENT
Start: 2022-12-02 | End: 2022-12-02

## 2022-12-02 RX ORDER — FENTANYL CITRATE 50 UG/ML
INJECTION, SOLUTION INTRAMUSCULAR; INTRAVENOUS
Status: DISCONTINUED | OUTPATIENT
Start: 2022-12-02 | End: 2022-12-02

## 2022-12-02 RX ORDER — SODIUM CHLORIDE 9 MG/ML
INJECTION, SOLUTION INTRAVENOUS CONTINUOUS PRN
Status: DISCONTINUED | OUTPATIENT
Start: 2022-12-02 | End: 2022-12-02

## 2022-12-02 RX ORDER — BUPIVACAINE HYDROCHLORIDE AND EPINEPHRINE 5; 5 MG/ML; UG/ML
INJECTION, SOLUTION EPIDURAL; INTRACAUDAL; PERINEURAL
Status: DISCONTINUED | OUTPATIENT
Start: 2022-12-02 | End: 2022-12-02 | Stop reason: HOSPADM

## 2022-12-02 RX ADMIN — MIDAZOLAM HYDROCHLORIDE 1 MG: 1 INJECTION, SOLUTION INTRAMUSCULAR; INTRAVENOUS at 11:12

## 2022-12-02 RX ADMIN — PROPOFOL 15 MCG/KG/MIN: 10 INJECTION, EMULSION INTRAVENOUS at 11:12

## 2022-12-02 RX ADMIN — ONDANSETRON 4 MG: 2 INJECTION INTRAMUSCULAR; INTRAVENOUS at 11:12

## 2022-12-02 RX ADMIN — FENTANYL CITRATE 50 MCG: 50 INJECTION, SOLUTION INTRAMUSCULAR; INTRAVENOUS at 11:12

## 2022-12-02 RX ADMIN — SODIUM CHLORIDE, SODIUM GLUCONATE, SODIUM ACETATE, POTASSIUM CHLORIDE, MAGNESIUM CHLORIDE, SODIUM PHOSPHATE, DIBASIC, AND POTASSIUM PHOSPHATE: .53; .5; .37; .037; .03; .012; .00082 INJECTION, SOLUTION INTRAVENOUS at 11:12

## 2022-12-02 RX ADMIN — DEXMEDETOMIDINE HYDROCHLORIDE 4 MCG: 100 INJECTION, SOLUTION INTRAVENOUS at 11:12

## 2022-12-02 RX ADMIN — HYDROMORPHONE HYDROCHLORIDE 0.2 MG: 1 INJECTION, SOLUTION INTRAMUSCULAR; INTRAVENOUS; SUBCUTANEOUS at 01:12

## 2022-12-02 RX ADMIN — FENTANYL CITRATE 25 MCG: 50 INJECTION, SOLUTION INTRAMUSCULAR; INTRAVENOUS at 11:12

## 2022-12-02 RX ADMIN — TACROLIMUS 900 MG: 0.5 CAPSULE ORAL at 11:12

## 2022-12-02 RX ADMIN — DEXMEDETOMIDINE HYDROCHLORIDE 8 MCG: 100 INJECTION, SOLUTION INTRAVENOUS at 11:12

## 2022-12-02 RX ADMIN — LIDOCAINE HYDROCHLORIDE 80 MG: 20 INJECTION, SOLUTION EPIDURAL; INFILTRATION; INTRACAUDAL; PERINEURAL at 11:12

## 2022-12-02 RX ADMIN — MIDAZOLAM HYDROCHLORIDE 2 MG: 1 INJECTION, SOLUTION INTRAMUSCULAR; INTRAVENOUS at 11:12

## 2022-12-02 RX ADMIN — SODIUM CHLORIDE: 0.9 INJECTION, SOLUTION INTRAVENOUS at 10:12

## 2022-12-02 NOTE — TRANSFER OF CARE
"Anesthesia Transfer of Care Note    Patient: Tia Chung    Procedure(s) Performed: Procedure(s) (LRB):  EXCISION, MASS, RIGHT FLANK (Right)    Patient location: PACU    Anesthesia Type: general    Transport from OR: Transported from OR on 6-10 L/min O2 by face mask with adequate spontaneous ventilation    Post pain: adequate analgesia    Post assessment: no apparent anesthetic complications    Post vital signs: stable    Level of consciousness: responds to stimulation    Nausea/Vomiting: no nausea/vomiting    Complications: none    Transfer of care protocol was followed      Last vitals:   Visit Vitals  /88 (BP Location: Right arm, Patient Position: Lying)   Pulse (!) 55   Temp 36.7 °C (98 °F) (Temporal)   Resp 18   Ht 5' 2.5" (1.588 m)   Wt (!) 140.9 kg (310 lb 10.1 oz)   SpO2 100%   Breastfeeding No   BMI 55.91 kg/m²     "

## 2022-12-02 NOTE — PLAN OF CARE
Discharge instructions reviewed. Vitals stable. No nausea or vomiting. Pain at tolerable level. Tolerating PO fluids. No questions or concerns at this time.

## 2022-12-02 NOTE — PATIENT INSTRUCTIONS
WOUND CARE  -- You have gauze and tape over your incision. Please leave this on for 48 hours. You may remove the gauze and tape then, and see that you also have skin glue over your incision. This will slowly flake away over the next few weeks. Do not pick at surgical glue, it will come off on its own.   -- Ok to shower after you remove the tape and gauze; however, no baths or submerging in water (I.e. swimming, submerging in water) for at least two weeks.  -- Please keep the incision clean with soap and water, pat your incision dry, do not scrub hard over your incisions.     MEDICATIONS AND PAIN CONTROL  -- Please resume all home medications as instructed and take any newly prescribed medications.  -- Most people find that over-the-counter pain medications (Tylenol combined with Ibuprofen) will be sufficient for most pain control.  -- No straining, avoid constipation. May take OTC stool softeners as described and laxatives as needed.     OTHER INSTRUCTIONS  -- Monitor for temp > 101 F, bleeding, redness, purulent drainage, or any sudden, new extreme pain. If any occur, please call our clinic or go to the emergency department if after normal business hours.  -- You may resume your regular diet as tolerated.   -- No heavy lifting (anything >10 lbs or = to a gallon of milk) or strenuous exercise until cleared by a physician. No pushing or pulling activities such as vacuuming or raking. Otherwise, activity as tolerated.

## 2022-12-02 NOTE — OP NOTE
DATE OF PROCEDURE: 12/2/2022    PRE OP DIAGNOSIS: Lipoma of torso [D17.1]    POST OP DIAGNOSIS: Lipoma of torso [D17.1]    PROCEDURE: Procedure(s) (LRB):  EXCISION, MASS, RIGHT FLANK (Right)    Surgeon(s) and Role:     * Sonja Lang MD - Primary     * Cora Goncalves MD - Resident - Assisting    ANESTHESIA: MAC + local    INDICATION: Patient is a 41 year-old woman who presented with a right flank subcutaneous mass for which she desired removal. Exam was most consistent with a lipoma. After discussing the risks, benefits and alternatives to removal, she elected to proceed.     FINDINGS:   Right flank subcutaneous mass 8 x 4.5 x 3 cm, removed     DESCRIPTION OF OPERATION: The patient was met in the pre-operative area, her identity and consent confirmed and surgical site marked. She was then brought to the Operating Room and positioned left lateral decubitus. Sedation was provided. SCDs and a warming blanket were placed and pre-op antibiotics were infused within 30 minutes of the incision. The surgical site was prepped and draped in sterile fashion and a pre-procedural pause performed by all members of the surgical and anesthesia teams. A transverse incision was made over the center of the subcutaneous masses of interested after infiltrating the skin with 0.25% marcaine. Dissection was carried through the subcutaneous tissue down to the mass which was deep to Sofie's fascia and well-circumscribed. This was freed circumferentially and passed off the field. This measures 8 x 4.5 x 3 cm. The wound was irrigated with sterile saline and hemostasis ensured. The subcutaneous tissue was reapproximated with interrupted 3-0 vicryl and the skin closed with 4-0 Monocryl suture in running subcuticular fashion and reinforced with Dermabond. The patient was awoken from anesthesia and brought to the PACU in good condition having tolerated the operation well.     EBL: <1 mL  Specimen: Right flank lipoma  Drains:  The right coronary artery was selectively engaged and injected. A Catheter 4fr 100cm Jr4 Mod Crv Cordis Infnt  was used. Multiple views of the injected vessel were taken.  None  Complications: None apparent  Dispo: Home from PACU     Surgical sponge and needle count was correct at the end of the case. I was present and scrubbed for the entirety of this operation.     Sonja Lang  12/2/2022

## 2022-12-02 NOTE — BRIEF OP NOTE
[facility] - Surgery ([facility])  Brief Operative Note    Surgery Date: 12/2/2022     Surgeon(s) and Role:     * Sonja Lang MD - Primary     * Cora Goncalves MD - Resident - Assisting        Pre-op Diagnosis:  Lipoma of torso [D17.1]    Post-op Diagnosis:  Post-Op Diagnosis Codes:     * Lipoma of torso [D17.1]    Procedure(s) (LRB):  EXCISION, MASS, RIGHT FLANK (Right)    Anesthesia: Choice    Operative Findings: 8 x 4.5 x 3cm mass removed uneventfully from right flank. Hemostasis achieved. Specimen sent to pathology.    Estimated Blood Loss: Minimal         Specimens:   Specimen (24h ago, onward)       Start     Ordered    12/02/22 1200  Specimen to Pathology, Surgery General Surgery  Once        Comments: Pre-op Diagnosis: Lipoma of torso [D17.1]Procedure(s):EXCISION, MASS, RIGHT FLANK Number of specimens: 1Name of specimens: 1. Right flank lipoma- permanent     References:    Click here for ordering Quick Tip   Question Answer Comment   Procedure Type: General Surgery    Specimen Class: Routine/Screening    Which provider would you like to cc? SONJA MOLINA    Release to patient Immediate        12/02/22 1202                      Discharge Note    OUTCOME: Patient tolerated treatment/procedure well without complication and is now ready for discharge.    DISPOSITION: Home or Self Care    FINAL DIAGNOSIS:  R flank mass    FOLLOWUP: In clinic    DISCHARGE INSTRUCTIONS:    Discharge Procedure Orders   Diet Adult Regular     Lifting restrictions   Order Comments: No lifting greater than 10 pounds for 6 weeks from day of surgery.  No pushing/pulling such as vacuuming or raking.  No straining, avoid constipation and take stool softeners as described and laxatives as needed.  No driving while on narcotics and until you can react quickly without pain.     Notify your health care provider if you experience any of the following:  temperature >100.4     Notify your health care provider if you  experience any of the following:  severe uncontrolled pain     Notify your health care provider if you experience any of the following:  redness, tenderness, or signs of infection (pain, swelling, redness, odor or green/yellow discharge around incision site)

## 2022-12-02 NOTE — H&P
History & Physical     SUBJECTIVE:      History of Present Illness 10/12/22:  Patient is a 41 year-old morbidly obese woman with PCOS who presents for evaluation of a right flank soft tissue mass. She noticed this a few months ago and feels it has slowly been increasing in size. It is overall asymptomatic although is tender to direct pressure such as laying directly on it. She denies numbness, tingling, skin changes, drainage, or h/o infection. She would like to have it removed.    Int Hx 12/2/22: Pt reports no changes to medical hx since last visit. Accompanied by wife, Faina.          Chief Complaint   Patient presents with    Mass       R flank subcutaneous           Review of patient's allergies indicates:   Allergen Reactions    Penicillins Anaphylaxis      Current Medications          Current Outpatient Medications   Medication Sig Dispense Refill    brimonidine 0.2% (ALPHAGAN) 0.2 % Drop Place 1 drop into both eyes 3 (three) times daily. 10 mL 3    difluprednate (DUREZOL) 0.05 % Drop ophthalmic solution Place 1 drop into the left eye 2 (two) times daily. 5 mL 0    dorzolamide-timolol 2-0.5% (COSOPT) 22.3-6.8 mg/mL ophthalmic solution Place 1 drop into both eyes 2 (two) times daily. 10 mL 3    famotidine (PEPCID) 20 MG tablet Take one tablet by mouth daily 90 tablet 1    latanoprost 0.005 % ophthalmic solution INSTILL 1 DROP INTO EACH EYE ONCE DAILY 3 mL 0      No current facility-administered medications for this visit.              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, moderate stage 5/27/2021    History of hysteroscopy      Infertility associated with anovulation      Nuclear cataract 1/12/2017    PCOS (polycystic ovarian syndrome)      PONV (postoperative nausea and vomiting)              Past Surgical History:   Procedure Laterality Date    ABDOMINAL SURGERY        BREAST BIOPSY        CATARACT EXTRACTION  W/  INTRAOCULAR LENS IMPLANT Left 8/5/2021     Procedure: EXTRACTION, CATARACT, WITH IOL INSERTION;  Surgeon: Elvia Sharp MD;  Location: [facility];  Service: Ophthalmology;  Laterality: Left;  LASER Catalys     CHOLECYSTECTOMY        CORNEAL TRANSPLANT Left 1/21/16     Dr Sharp    CORNEAL TRANSPLANT Right 01/12/2017     Dr. Sharp    DILATION AND CURETTAGE OF UTERUS        gallbaldder N/A 04/2015    HERNIA REPAIR         HIATAL    HIATAL HERNIA REPAIR   2015    IVF        MYOECTOMY        SLEEVE GASTROPLASTY        STOMACH SURGERY   Gastric sleeve            Family History   Problem Relation Age of Onset    Breast cancer Maternal Grandmother      Diabetes Mother      Hypertension Mother      Glaucoma Mother      Breast cancer Maternal Aunt      Breast cancer Paternal Aunt      No Known Problems Father      No Known Problems Sister      No Known Problems Brother      No Known Problems Maternal Uncle      No Known Problems Paternal Uncle      No Known Problems Paternal Grandmother      No Known Problems Paternal Grandfather      Breast cancer Maternal Aunt      Breast cancer Maternal Aunt      Breast cancer Maternal Aunt      Breast cancer Maternal Aunt      Amblyopia Neg Hx      Blindness Neg Hx      Cancer Neg Hx      Cataracts Neg Hx      Macular degeneration Neg Hx      Retinal detachment Neg Hx      Strabismus Neg Hx      Stroke Neg Hx      Thyroid disease Neg Hx      Colon cancer Neg Hx      Ovarian cancer Neg Hx        Social History           Tobacco Use    Smoking status: Never    Smokeless tobacco: Never   Substance Use Topics    Alcohol use: Not Currently    Drug use: No      Review of Systems:  Review of Systems   Constitutional: Negative.    HENT: Negative.     Eyes: Negative.    Respiratory: Negative.     Cardiovascular: Negative.    Gastrointestinal: Negative.    Endocrine: Negative.    Genitourinary: Negative.    Musculoskeletal: Negative.    Skin: Negative.    Allergic/Immunologic: Negative.     Neurological: Negative.    Hematological: Negative.    Psychiatric/Behavioral: Negative.        OBJECTIVE:      Vital Signs (Most Recent)  Vitals:    12/02/22 0941   BP: (!) 155/80   Pulse:    Resp:    Temp:       Physical Exam:  Physical Exam  Vitals reviewed.   Constitutional:       General: She is not in acute distress.     Appearance: Normal appearance. She is well-developed. She is obese. She is not ill-appearing.   HENT:      Head: Normocephalic and atraumatic.   Eyes:      General: No scleral icterus.     Conjunctiva/sclera: Conjunctivae normal.   Cardiovascular:      Rate and Rhythm: Normal rate and regular rhythm.   Pulmonary:      Effort: Pulmonary effort is normal. No respiratory distress.   Abdominal:      General: There is no distension.      Palpations: Abdomen is soft. There is mass (R flank soft, mobile, rubbery subcutaneous mass approx 7 x 5 cm c/w lipoma).      Tenderness: There is no abdominal tenderness. There is no guarding.   Musculoskeletal:         General: Normal range of motion.      Cervical back: Normal range of motion and neck supple.   Skin:     General: Skin is warm and dry.      Findings: No erythema.   Neurological:      General: No focal deficit present.      Mental Status: She is alert and oriented to person, place, and time.   Psychiatric:         Mood and Affect: Mood normal.         Behavior: Behavior normal.      ASSESSMENT/PLAN:   Patient is a 41 year-old woman who presents with a R flank lipoma.     To OR for lipoma removal

## 2022-12-08 NOTE — ANESTHESIA POSTPROCEDURE EVALUATION
Anesthesia Post Evaluation    Patient: Tia Chung    Procedure(s) Performed: Procedure(s) (LRB):  EXCISION, MASS, RIGHT FLANK (Right)    Final Anesthesia Type: general      Patient location during evaluation: PACU  Patient participation: Yes- Able to Participate  Level of consciousness: awake and alert and oriented  Post-procedure vital signs: reviewed and stable  Pain management: adequate  Airway patency: patent    PONV status at discharge: No PONV  Anesthetic complications: no      Cardiovascular status: hemodynamically stable  Respiratory status: unassisted, spontaneous ventilation and room air  Hydration status: euvolemic  Follow-up not needed.          Vitals Value Taken Time   /63 12/02/22 1346   Temp 36.7 °C (98 °F) 12/02/22 1233   Pulse 60 12/02/22 1400   Resp 20 12/02/22 1400   SpO2 96 % 12/02/22 1400         No case tracking events are documented in the log.      Pain/Edgard Score: No data recorded

## 2022-12-12 ENCOUNTER — HOSPITAL ENCOUNTER (OUTPATIENT)
Dept: RADIOLOGY | Facility: HOSPITAL | Age: 42
Discharge: HOME | End: 2022-12-12
Attending: HOSPITALIST
Payer: COMMERCIAL

## 2022-12-12 DIAGNOSIS — Z12.31 ENCOUNTER FOR SCREENING MAMMOGRAM FOR BREAST CANCER: ICD-10-CM

## 2022-12-12 PROCEDURE — 77063 BREAST TOMOSYNTHESIS BI: CPT | Mod: TC

## 2022-12-12 PROCEDURE — 77063 BREAST TOMOSYNTHESIS BI: CPT | Mod: 26,,, | Performed by: RADIOLOGY

## 2022-12-12 PROCEDURE — 77067 MAMMO DIGITAL SCREENING BILAT WITH TOMO: ICD-10-PCS | Mod: 26,,, | Performed by: RADIOLOGY

## 2022-12-12 PROCEDURE — 77067 SCR MAMMO BI INCL CAD: CPT | Mod: 26,,, | Performed by: RADIOLOGY

## 2022-12-12 PROCEDURE — 77063 MAMMO DIGITAL SCREENING BILAT WITH TOMO: ICD-10-PCS | Mod: 26,,, | Performed by: RADIOLOGY

## 2022-12-12 PROCEDURE — 77067 SCR MAMMO BI INCL CAD: CPT | Mod: TC

## 2022-12-14 ENCOUNTER — OFFICE VISIT (OUTPATIENT)
Dept: SURGERY | Facility: CLINIC | Age: 42
End: 2022-12-14
Payer: COMMERCIAL

## 2022-12-14 VITALS
WEIGHT: 293 LBS | SYSTOLIC BLOOD PRESSURE: 174 MMHG | HEIGHT: 63 IN | HEART RATE: 92 BPM | DIASTOLIC BLOOD PRESSURE: 85 MMHG | BODY MASS INDEX: 51.91 KG/M2

## 2022-12-14 DIAGNOSIS — I10 PRIMARY HYPERTENSION: ICD-10-CM

## 2022-12-14 DIAGNOSIS — E66.01 CLASS 3 SEVERE OBESITY DUE TO EXCESS CALORIES WITH BODY MASS INDEX (BMI) OF 50.0 TO 59.9 IN ADULT, UNSPECIFIED WHETHER SERIOUS COMORBIDITY PRESENT: ICD-10-CM

## 2022-12-14 DIAGNOSIS — L68.0 HIRSUTISM: ICD-10-CM

## 2022-12-14 DIAGNOSIS — E28.2 PCOS (POLYCYSTIC OVARIAN SYNDROME): ICD-10-CM

## 2022-12-14 DIAGNOSIS — D17.1 LIPOMA OF TORSO: ICD-10-CM

## 2022-12-14 DIAGNOSIS — L72.3 SEBACEOUS CYST OF RIGHT AXILLA: Primary | ICD-10-CM

## 2022-12-14 LAB
FINAL PATHOLOGIC DIAGNOSIS: NORMAL
GROSS: NORMAL
Lab: NORMAL

## 2022-12-14 PROCEDURE — 1160F PR REVIEW ALL MEDS BY PRESCRIBER/CLIN PHARMACIST DOCUMENTED: ICD-10-PCS | Mod: CPTII,S$GLB,, | Performed by: SURGERY

## 2022-12-14 PROCEDURE — 3077F SYST BP >= 140 MM HG: CPT | Mod: CPTII,S$GLB,, | Performed by: SURGERY

## 2022-12-14 PROCEDURE — 3079F DIAST BP 80-89 MM HG: CPT | Mod: CPTII,S$GLB,, | Performed by: SURGERY

## 2022-12-14 PROCEDURE — 99213 OFFICE O/P EST LOW 20 MIN: CPT | Mod: 24,S$GLB,, | Performed by: SURGERY

## 2022-12-14 PROCEDURE — 3044F HG A1C LEVEL LT 7.0%: CPT | Mod: CPTII,S$GLB,, | Performed by: SURGERY

## 2022-12-14 PROCEDURE — 1160F RVW MEDS BY RX/DR IN RCRD: CPT | Mod: CPTII,S$GLB,, | Performed by: SURGERY

## 2022-12-14 PROCEDURE — 99999 PR PBB SHADOW E&M-EST. PATIENT-LVL III: CPT | Mod: PBBFAC,,, | Performed by: SURGERY

## 2022-12-14 PROCEDURE — 3044F PR MOST RECENT HEMOGLOBIN A1C LEVEL <7.0%: ICD-10-PCS | Mod: CPTII,S$GLB,, | Performed by: SURGERY

## 2022-12-14 PROCEDURE — 1159F PR MEDICATION LIST DOCUMENTED IN MEDICAL RECORD: ICD-10-PCS | Mod: CPTII,S$GLB,, | Performed by: SURGERY

## 2022-12-14 PROCEDURE — 3008F PR BODY MASS INDEX (BMI) DOCUMENTED: ICD-10-PCS | Mod: CPTII,S$GLB,, | Performed by: SURGERY

## 2022-12-14 PROCEDURE — 99999 PR PBB SHADOW E&M-EST. PATIENT-LVL III: ICD-10-PCS | Mod: PBBFAC,,, | Performed by: SURGERY

## 2022-12-14 PROCEDURE — 3008F BODY MASS INDEX DOCD: CPT | Mod: CPTII,S$GLB,, | Performed by: SURGERY

## 2022-12-14 PROCEDURE — 3077F PR MOST RECENT SYSTOLIC BLOOD PRESSURE >= 140 MM HG: ICD-10-PCS | Mod: CPTII,S$GLB,, | Performed by: SURGERY

## 2022-12-14 PROCEDURE — 99213 PR OFFICE/OUTPT VISIT, EST, LEVL III, 20-29 MIN: ICD-10-PCS | Mod: 24,S$GLB,, | Performed by: SURGERY

## 2022-12-14 PROCEDURE — 3079F PR MOST RECENT DIASTOLIC BLOOD PRESSURE 80-89 MM HG: ICD-10-PCS | Mod: CPTII,S$GLB,, | Performed by: SURGERY

## 2022-12-14 PROCEDURE — 1159F MED LIST DOCD IN RCRD: CPT | Mod: CPTII,S$GLB,, | Performed by: SURGERY

## 2022-12-16 ENCOUNTER — LAB VISIT (OUTPATIENT)
Dept: LAB | Facility: HOSPITAL | Age: 42
End: 2022-12-16
Payer: COMMERCIAL

## 2022-12-16 DIAGNOSIS — Z31.83 IN VITRO FERTILIZATION: Primary | ICD-10-CM

## 2022-12-16 LAB
ESTRADIOL SERPL-MCNC: 277 PG/ML
LH SERPL-ACNC: 5 MIU/ML
PROGEST SERPL-MCNC: 0.1 NG/ML

## 2022-12-16 PROCEDURE — 82670 ASSAY OF TOTAL ESTRADIOL: CPT

## 2022-12-16 PROCEDURE — 84144 ASSAY OF PROGESTERONE: CPT

## 2022-12-16 PROCEDURE — 36415 COLL VENOUS BLD VENIPUNCTURE: CPT

## 2022-12-16 PROCEDURE — 83002 ASSAY OF GONADOTROPIN (LH): CPT

## 2022-12-21 RX ORDER — SYRINGE WITH NEEDLE, 1 ML 25GX5/8"
SYRINGE, EMPTY DISPOSABLE MISCELLANEOUS
COMMUNITY
Start: 2022-07-14

## 2022-12-21 RX ORDER — PREDNISONE 10 MG/1
10 TABLET ORAL 2 TIMES DAILY
COMMUNITY
Start: 2022-07-12

## 2022-12-21 RX ORDER — DOXYCYCLINE HYCLATE 100 MG
100 TABLET ORAL 2 TIMES DAILY
COMMUNITY
Start: 2022-11-26

## 2022-12-21 RX ORDER — PROGESTERONE 50 MG/ML
50 INJECTION, SOLUTION INTRAMUSCULAR
COMMUNITY
Start: 2022-07-14

## 2022-12-21 RX ORDER — ENOXAPARIN SODIUM 100 MG/ML
INJECTION SUBCUTANEOUS
COMMUNITY
Start: 2022-07-14

## 2022-12-21 NOTE — PROGRESS NOTES
"History & Physical    SUBJECTIVE:     History of Present Illness:  Patient is a 42 year-old morbidly obese woman with PCOS currently undergoing IVF therapy who presents for post-op follow-up s/p removal of an 8 x 4.5 x 2 cm right flank subcutaneous mass on 12/2/22. She has done well post-op from that perspective. Final path is pending. She has unfortunately developed a right axillary cyst with spontaneous drainage since. She is taking doxycycline as a part of her IVF regimen. She denies fevers, chills, nausea, vomiting, malaise, or limited RUE ROM. She would like to have it excised.    Chief Complaint   Patient presents with    Post-op Evaluation     Lipoma removal    Cyst     R axillary     Review of patient's allergies indicates:   Allergen Reactions    Penicillins Anaphylaxis     Current Outpatient Medications   Medication Sig Dispense Refill    brimonidine 0.2% (ALPHAGAN) 0.2 % Drop Place 1 drop into both eyes 3 (three) times daily. 10 mL 3    difluprednate (DUREZOL) 0.05 % Drop ophthalmic solution Place 1 drop into the left eye 2 (two) times daily. 5 mL 0    dorzolamide-timolol 2-0.5% (COSOPT) 22.3-6.8 mg/mL ophthalmic solution Place 1 drop into both eyes 2 (two) times daily. 10 mL 3    estradioL (ESTRACE) 2 MG tablet Take 2 mg by mouth 2 (two) times daily.      famotidine (PEPCID) 20 MG tablet Take one tablet by mouth daily 90 tablet 1    latanoprost 0.005 % ophthalmic solution Place 1 drop into both eyes once daily. 3 mL 0    montelukast (SINGULAIR) 10 mg tablet Take 1 tablet (10 mg total) by mouth every evening. 30 tablet 11    progesterone (PROMETRIUM) 200 MG capsule Take 200 mg by mouth 2 (two) times daily.      scopolamine (TRANSDERM-SCOP) 1.3-1.5 mg (1 mg over 3 days) Place 1 patch onto the skin every 72 hours. 5 patch 0    BD LUER-MATILDA SYRINGE 3 mL 18 x 1 1/2" Syrg USE TO DRAW UP ONCE DAILY      BD LUER-MATILDA SYRINGE 3 mL 22 x 1 1/2" Syrg SMARTSIG:Injection Daily      doxycycline (VIBRA-TABS) 100 MG tablet " Take 100 mg by mouth 2 (two) times daily.      enoxaparin (LOVENOX) 40 mg/0.4 mL Syrg Inject into the skin.      oxyCODONE (ROXICODONE) 5 MG immediate release tablet Take 1 tablet (5 mg total) by mouth every 6 (six) hours as needed for Pain. (Patient not taking: Reported on 12/14/2022) 6 tablet 0    predniSONE (DELTASONE) 10 MG tablet Take 10 mg by mouth 2 (two) times daily.      progesterone (PROGESTERONE IN OIL) 50 mg/mL injection Inject 50 mg into the muscle.       No current facility-administered medications for this visit.     Facility-Administered Medications Ordered in Other Visits   Medication Dose Route Frequency Provider Last Rate Last Admin    fentaNYL 50 mcg/mL injection 25 mcg  25 mcg Intravenous Q5 Min PRN Tanner Junior MD        haloperidol lactate injection 0.5 mg  0.5 mg Intravenous Q10 Min PRN Tanner Junior MD        HYDROmorphone injection 0.2 mg  0.2 mg Intravenous Q5 Min PRN Tanner Junior MD   0.2 mg at 12/02/22 1330    scopolamine 1.3-1.5 mg (1 mg over 3 days) 1 patch  1 patch Transdermal Q3 Days Tanner Junior MD        sodium chloride 0.9% flush 10 mL  10 mL Intravenous PRN Tanner Junior MD         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, moderate stage 5/27/2021    History of hysteroscopy     Infertility associated with anovulation     Nuclear cataract 1/12/2017    PCOS (polycystic ovarian syndrome)     PONV (postoperative nausea and vomiting)     Primary hypertension 10/31/2022     Past Surgical History:   Procedure Laterality Date    BREAST BIOPSY      CATARACT EXTRACTION W/  INTRAOCULAR LENS IMPLANT Left 08/05/2021    Procedure: EXTRACTION, CATARACT, WITH IOL INSERTION;  Surgeon: Elvia Sharp MD;  Location: [facility];  Service: Ophthalmology;  Laterality: Left;  LASER Catalys     CHOLECYSTECTOMY  04/2015    CORNEAL TRANSPLANT Left 01/21/2016    Dr Sharp    CORNEAL TRANSPLANT Right 01/12/2017      "[facility]    DILATION AND CURETTAGE OF UTERUS      HIATAL HERNIA REPAIR  2015    IVF      LAPAROSCOPIC SLEEVE GASTRECTOMY      LIPOMA RESECTION Right 12/02/2022    torso    MYOECTOMY       Family History   Problem Relation Age of Onset    Breast cancer Maternal Grandmother     Diabetes Mother     Hypertension Mother     Glaucoma Mother     Breast cancer Maternal Aunt     Breast cancer Paternal Aunt     No Known Problems Father     No Known Problems Sister     No Known Problems Brother     No Known Problems Maternal Uncle     No Known Problems Paternal Uncle     No Known Problems Paternal Grandmother     No Known Problems Paternal Grandfather     Breast cancer Maternal Aunt     Breast cancer Maternal Aunt     Breast cancer Maternal Aunt     Breast cancer Maternal Aunt     Amblyopia Neg Hx     Blindness Neg Hx     Cancer Neg Hx     Cataracts Neg Hx     Macular degeneration Neg Hx     Retinal detachment Neg Hx     Strabismus Neg Hx     Stroke Neg Hx     Thyroid disease Neg Hx     Colon cancer Neg Hx     Ovarian cancer Neg Hx      Social History     Tobacco Use    Smoking status: Never    Smokeless tobacco: Never   Substance Use Topics    Alcohol use: Not Currently    Drug use: No      Review of Systems:  Review of Systems   Constitutional: Negative.    HENT: Negative.     Eyes: Negative.    Respiratory: Negative.     Cardiovascular: Negative.    Gastrointestinal: Negative.    Endocrine: Negative.    Genitourinary: Negative.    Musculoskeletal: Negative.    Skin: Negative.    Neurological: Negative.    Hematological: Negative.    Psychiatric/Behavioral: Negative.       OBJECTIVE:     Vital Signs (Most Recent)  Pulse: 92 (12/14/22 1523)  BP: (!) 174/85 (12/14/22 1523)  5' 2.5" (1.588 m)  (!) 140.4 kg (309 lb 6.7 oz)     Physical Exam:  Physical Exam  Vitals reviewed.   Constitutional:       General: She is not in acute distress.     Appearance: Normal appearance. She is well-developed. She is obese. She is not ill-appearing. "   HENT:      Head: Normocephalic and atraumatic.   Eyes:      Conjunctiva/sclera: Conjunctivae normal.   Cardiovascular:      Rate and Rhythm: Normal rate and regular rhythm.   Pulmonary:      Effort: Pulmonary effort is normal. No respiratory distress.   Abdominal:      General: There is no distension.      Palpations: Abdomen is soft.      Tenderness: There is no abdominal tenderness. There is no guarding.   Musculoskeletal:      Cervical back: Normal range of motion and neck supple.      Comments: R axillary cyst with mild surrounding inflammation   Skin:     General: Skin is warm and dry.      Comments: Well-healing R torso incision with palpable healing ridge, no e/o infection or wound breakdown   Neurological:      General: No focal deficit present.      Mental Status: She is alert and oriented to person, place, and time.   Psychiatric:         Mood and Affect: Mood normal.         Behavior: Behavior normal.     ASSESSMENT/PLAN:   Patient doing well s/p lipoma removal.  Now with R axillary cyst. On therapeutic abx as a part of her IVF regimen.  We discussed the risks, benefits and alternatives to cyst excision under local, the risks including but not limited to bleeding, infection, scarring, pain, numbness, and recurrence. All questions were answered to her satisfaction and she has elected to proceed. Informed consent was obtained. Will plan for after the New Year in the minor procedure room.    Sonja Lang  12/14/22

## 2022-12-28 ENCOUNTER — LAB VISIT (OUTPATIENT)
Dept: LAB | Facility: HOSPITAL | Age: 42
End: 2022-12-28
Payer: COMMERCIAL

## 2022-12-28 DIAGNOSIS — Z31.49 OTHER INVESTIGATION AND TESTING FOR PROCREATIVE MANAGEMENT: Primary | ICD-10-CM

## 2022-12-28 LAB
ESTRADIOL SERPL-MCNC: 160 PG/ML
PROGEST SERPL-MCNC: 27.3 NG/ML

## 2022-12-28 PROCEDURE — 36415 COLL VENOUS BLD VENIPUNCTURE: CPT

## 2022-12-28 PROCEDURE — 84144 ASSAY OF PROGESTERONE: CPT

## 2022-12-28 PROCEDURE — 82670 ASSAY OF TOTAL ESTRADIOL: CPT

## 2023-01-03 ENCOUNTER — LAB VISIT (OUTPATIENT)
Dept: LAB | Facility: HOSPITAL | Age: 43
End: 2023-01-03
Payer: COMMERCIAL

## 2023-01-03 DIAGNOSIS — Z32.00 PREGNANCY EXAMINATION OR TEST, PREGNANCY UNCONFIRMED: Primary | ICD-10-CM

## 2023-01-03 LAB
HCG INTACT+B SERPL-ACNC: <2.4 MIU/ML
PROGEST SERPL-MCNC: 28 NG/ML

## 2023-01-03 PROCEDURE — 84702 CHORIONIC GONADOTROPIN TEST: CPT

## 2023-01-03 PROCEDURE — 36415 COLL VENOUS BLD VENIPUNCTURE: CPT

## 2023-01-03 PROCEDURE — 84144 ASSAY OF PROGESTERONE: CPT

## 2023-01-03 PROCEDURE — 88304 TISSUE EXAM BY PATHOLOGIST: CPT | Performed by: PATHOLOGY

## 2023-01-04 ENCOUNTER — PROCEDURE VISIT (OUTPATIENT)
Dept: SURGERY | Facility: CLINIC | Age: 43
End: 2023-01-04
Payer: COMMERCIAL

## 2023-01-04 VITALS
WEIGHT: 293 LBS | HEART RATE: 64 BPM | SYSTOLIC BLOOD PRESSURE: 134 MMHG | HEIGHT: 63 IN | DIASTOLIC BLOOD PRESSURE: 68 MMHG | BODY MASS INDEX: 51.91 KG/M2

## 2023-01-04 DIAGNOSIS — L72.3 SEBACEOUS CYST OF RIGHT AXILLA: Primary | ICD-10-CM

## 2023-01-04 DIAGNOSIS — E66.01 CLASS 3 SEVERE OBESITY DUE TO EXCESS CALORIES WITH BODY MASS INDEX (BMI) OF 50.0 TO 59.9 IN ADULT, UNSPECIFIED WHETHER SERIOUS COMORBIDITY PRESENT: ICD-10-CM

## 2023-01-04 DIAGNOSIS — L68.0 HIRSUTISM: ICD-10-CM

## 2023-01-04 PROCEDURE — 11401 EXC TR-EXT B9+MARG 0.6-1 CM: CPT | Mod: 79,S$GLB,, | Performed by: SURGERY

## 2023-01-04 PROCEDURE — 11401 PR EXC SKIN BENIG 0.6-1 CM TRUNK,ARM,LEG: ICD-10-PCS | Mod: 79,S$GLB,, | Performed by: SURGERY

## 2023-01-04 NOTE — PROCEDURES
Exc, Cyst    Date/Time: 1/4/2023 1:45 PM  Performed by: Sonja Lang MD  Authorized by: Sonja Lang MD     Consent Done?:  Yes (Written)  Timeout: prior to procedure the correct patient, procedure, and site was verified    Prep: patient was prepped and draped in usual sterile fashion    Local anesthesia used?: Yes    Anesthesia:  Local infiltration  Local anesthetic:  Lidocaine 1% with epinephrine  Anesthetic total (ml):  10  Assistants?: Yes    List of assistants:  Zahraa Dillon MD, PGY1   I was present for the entire procedure.  Indications:  Cyst  Location: axilla.  Laterality:  Right  Position:  Lateral   Patient was prepped and draped in the normal sterile fashion.  Anesthesia:  Local infiltration  Local anesthetic:  Lidocaine 1% with epinephrine  Excision size (cm):  3  Scalpel size:  15  Incision type:  Elliptical  Specimens?: Yes     Specimens submitted to pathology.   Hemostasis was obtained.  Estimated blood loss (cc):  1  Wound closure:  Intermediate layered  Sutures:  3-0 Vicryl and 4-0 Monocryl  Dressings: Dermabond.  Post-op diagnosis:  Same as pre-op diagnosis.   Needle, instrument, and sponge counts were correct.   Patient tolerated the procedure well with no immediate complications.   Post-operative instructions were provided for the patient.   Patient was discharged and will follow up for wound check and pathology results.  Comments:      Uncomplicated excision of 1-cm recently infected right axillary cyst under local. Tolerated well. F/u in 2 weeks for wound check and path review.    Sonja Lang  1/4/2023

## 2023-01-05 PROCEDURE — 88304 TISSUE EXAM BY PATHOLOGIST: CPT | Mod: 26,,, | Performed by: PATHOLOGY

## 2023-01-05 PROCEDURE — 88304 PR  SURG PATH,LEVEL III: ICD-10-PCS | Mod: 26,,, | Performed by: PATHOLOGY

## 2023-01-11 LAB
FINAL PATHOLOGIC DIAGNOSIS: NORMAL
GROSS: NORMAL
Lab: NORMAL
MICROSCOPIC EXAM: NORMAL

## 2023-01-18 ENCOUNTER — OFFICE VISIT (OUTPATIENT)
Dept: SURGERY | Facility: CLINIC | Age: 43
End: 2023-01-18
Payer: COMMERCIAL

## 2023-01-18 VITALS
HEIGHT: 63 IN | WEIGHT: 293 LBS | HEART RATE: 79 BPM | DIASTOLIC BLOOD PRESSURE: 66 MMHG | SYSTOLIC BLOOD PRESSURE: 130 MMHG | BODY MASS INDEX: 51.91 KG/M2

## 2023-01-18 DIAGNOSIS — L72.3 SEBACEOUS CYST OF RIGHT AXILLA: Primary | ICD-10-CM

## 2023-01-18 PROCEDURE — 99024 POSTOP FOLLOW-UP VISIT: CPT | Mod: S$GLB,,, | Performed by: SURGERY

## 2023-01-18 PROCEDURE — 99999 PR PBB SHADOW E&M-EST. PATIENT-LVL III: ICD-10-PCS | Mod: PBBFAC,,, | Performed by: SURGERY

## 2023-01-18 PROCEDURE — 3008F BODY MASS INDEX DOCD: CPT | Mod: CPTII,S$GLB,, | Performed by: SURGERY

## 2023-01-18 PROCEDURE — 99999 PR PBB SHADOW E&M-EST. PATIENT-LVL III: CPT | Mod: PBBFAC,,, | Performed by: SURGERY

## 2023-01-18 PROCEDURE — 99024 PR POST-OP FOLLOW-UP VISIT: ICD-10-PCS | Mod: S$GLB,,, | Performed by: SURGERY

## 2023-01-18 PROCEDURE — 3078F DIAST BP <80 MM HG: CPT | Mod: CPTII,S$GLB,, | Performed by: SURGERY

## 2023-01-18 PROCEDURE — 3078F PR MOST RECENT DIASTOLIC BLOOD PRESSURE < 80 MM HG: ICD-10-PCS | Mod: CPTII,S$GLB,, | Performed by: SURGERY

## 2023-01-18 PROCEDURE — 1159F PR MEDICATION LIST DOCUMENTED IN MEDICAL RECORD: ICD-10-PCS | Mod: CPTII,S$GLB,, | Performed by: SURGERY

## 2023-01-18 PROCEDURE — 1159F MED LIST DOCD IN RCRD: CPT | Mod: CPTII,S$GLB,, | Performed by: SURGERY

## 2023-01-18 PROCEDURE — 3075F PR MOST RECENT SYSTOLIC BLOOD PRESS GE 130-139MM HG: ICD-10-PCS | Mod: CPTII,S$GLB,, | Performed by: SURGERY

## 2023-01-18 PROCEDURE — 3075F SYST BP GE 130 - 139MM HG: CPT | Mod: CPTII,S$GLB,, | Performed by: SURGERY

## 2023-01-18 PROCEDURE — 3008F PR BODY MASS INDEX (BMI) DOCUMENTED: ICD-10-PCS | Mod: CPTII,S$GLB,, | Performed by: SURGERY

## 2023-02-07 NOTE — PROGRESS NOTES
HPI:  Patient is a 42 year-old female who presents for post-procedure follow-up s/p excision of R axillary cyst 1/4/23. It is healing well without complication. No issues with pain or drainage. Prior lipoma site is also healing well.     PHYSICAL EXAM:  Physical Exam  Vitals reviewed.   Constitutional:       Appearance: She is not ill-appearing.   Cardiovascular:      Rate and Rhythm: Normal rate and regular rhythm.   Pulmonary:      Effort: Pulmonary effort is normal. No respiratory distress.   Skin:     Comments: Well-healing R axilla incision without e/o infection     ASSESSMENT:    Doing well s/p R axillary cyst excision.     PLAN:    Follow up PRN.    Sonja Lang  1/18/23

## 2023-03-14 ENCOUNTER — OFFICE VISIT (OUTPATIENT)
Dept: INTERNAL MEDICINE | Facility: CLINIC | Age: 43
End: 2023-03-14
Payer: COMMERCIAL

## 2023-03-14 DIAGNOSIS — E28.2 PCOS (POLYCYSTIC OVARIAN SYNDROME): ICD-10-CM

## 2023-03-14 DIAGNOSIS — E66.01 CLASS 3 SEVERE OBESITY DUE TO EXCESS CALORIES WITH BODY MASS INDEX (BMI) OF 45.0 TO 49.9 IN ADULT, UNSPECIFIED WHETHER SERIOUS COMORBIDITY PRESENT: Primary | ICD-10-CM

## 2023-03-14 PROCEDURE — 1160F PR REVIEW ALL MEDS BY PRESCRIBER/CLIN PHARMACIST DOCUMENTED: ICD-10-PCS | Mod: CPTII,95,, | Performed by: HOSPITALIST

## 2023-03-14 PROCEDURE — 99214 PR OFFICE/OUTPT VISIT, EST, LEVL IV, 30-39 MIN: ICD-10-PCS | Mod: 95,,, | Performed by: HOSPITALIST

## 2023-03-14 PROCEDURE — 1160F RVW MEDS BY RX/DR IN RCRD: CPT | Mod: CPTII,95,, | Performed by: HOSPITALIST

## 2023-03-14 PROCEDURE — 99214 OFFICE O/P EST MOD 30 MIN: CPT | Mod: 95,,, | Performed by: HOSPITALIST

## 2023-03-14 PROCEDURE — 1159F MED LIST DOCD IN RCRD: CPT | Mod: CPTII,95,, | Performed by: HOSPITALIST

## 2023-03-14 PROCEDURE — 1159F PR MEDICATION LIST DOCUMENTED IN MEDICAL RECORD: ICD-10-PCS | Mod: CPTII,95,, | Performed by: HOSPITALIST

## 2023-03-14 RX ORDER — SEMAGLUTIDE 0.25 MG/.5ML
0.25 INJECTION, SOLUTION SUBCUTANEOUS
Qty: 2 ML | Refills: 0 | Status: SHIPPED | OUTPATIENT
Start: 2023-03-14 | End: 2023-03-17 | Stop reason: SDUPTHER

## 2023-03-14 NOTE — PROGRESS NOTES
Virtual Visit  The patient location is: [place]   The chief complaint leading to consultation is: obesity  Visit type: Virtual visit with synchronous audio and video  Total time spent with patient: 15 min  Total time on encounter: 30 min.  Includes pre-charting, face-to-face encounter, medical decision making and documentation.     Each patient to whom he or she provides medical services by telemedicine is:  (1) informed of the relationship between the physician and patient and the respective role of any other health care provider with respect to management of the patient; and (2) notified that he or she may decline to receive medical services by telemedicine and may withdraw from such care at any time.    Subjective:         @Patient ID: Tia Chung is a 42 y.o. female.    Chief Complaint: Obesity (/)    HPI    40 yo F with HTN, PCOS, keratoconus s/p corneal transplant, obesity, s/p gastric sleeve presents for weight. Reports difficulty losing weight. Reports she does not eat much. Also has PCOS. Reports tried metformin but caused diarrhea and unable to tolerate.     Review of Systems   Constitutional:  Positive for unexpected weight change. Negative for chills and fever.   HENT:  Negative for congestion and sore throat.    Eyes:  Negative for pain and visual disturbance.   Respiratory:  Negative for cough and shortness of breath.    Cardiovascular:  Negative for chest pain and leg swelling.   Gastrointestinal:  Negative for abdominal pain, nausea and vomiting.   Endocrine: Negative for polydipsia and polyuria.   Genitourinary:  Negative for difficulty urinating and dysuria.   Musculoskeletal:  Negative for arthralgias and back pain.   Skin:  Negative for rash and wound.   Neurological:  Negative for dizziness, weakness and headaches.   Psychiatric/Behavioral:  Negative for agitation and confusion.    Past medical history, surgical history, and family medical history reviewed and updated as  appropriate.    Medications and allergies reviewed.     Objective:     There were no vitals filed for this visit.  There is no height or weight on file to calculate BMI.  Physical Exam  Constitutional:       Appearance: Normal appearance.   HENT:      Head: Normocephalic and atraumatic.   Pulmonary:      Effort: Pulmonary effort is normal.   Neurological:      Mental Status: She is alert and oriented to person, place, and time.   Psychiatric:         Mood and Affect: Mood normal.         Behavior: Behavior normal.       Lab Results   Component Value Date    WBC 8.07 12/12/2022    HGB 11.3 (L) 12/12/2022    HCT 36.7 (L) 12/12/2022     12/12/2022    CHOL 217 (H) 02/01/2022    TRIG 68 02/01/2022    HDL 69 02/01/2022    ALT 15 12/12/2022    AST 17 12/12/2022     12/12/2022    K 4.1 12/12/2022     12/12/2022    CREATININE 0.7 12/12/2022    BUN 13 12/12/2022    CO2 24 12/12/2022    TSH 1.853 12/12/2022    GLUF 90 09/23/2021    HGBA1C 5.3 02/01/2022       Assessment:     1. Class 3 severe obesity due to excess calories with body mass index (BMI) of 45.0 to 49.9 in adult, unspecified whether serious comorbidity present    2. PCOS (polycystic ovarian syndrome)      Plan:   Diagnoses and all orders for this visit:    Class 3 severe obesity due to excess calories with body mass index (BMI) of 45.0 to 49.9 in adult, unspecified whether serious comorbidity present  -     semaglutide, weight loss, (WEGOVY) 0.25 mg/0.5 mL PnIj; Inject 0.25 mg into the skin every 7 days.    PCOS (polycystic ovarian syndrome)  -     semaglutide, weight loss, (WEGOVY) 0.25 mg/0.5 mL PnIj; Inject 0.25 mg into the skin every 7 days.    Pt has tried and failed metformin  Also has had gastric sleeve. Still having issues with losing weight  - Will start wegovy   - No family or personal hx of medullary thyroid cancer or MEN type 2 syndrome.    - Counseled to monitor for side effects including nausea, abdominal pain etc and notify  MD    Addendum: 3/31/23 9:30AM  Pt has tried and failed losing weight with modifying her diet by reducing calories, undergoing gastric sleeve surgery. She has tried metformin for over a year and has failed therapy. Her BMI is 56.81 kg/m2. She is severely obese and She has Hypertension as a result of her obesity.      Sapna Vásquez MD  Internal Medicine    3/14/2023

## 2023-03-17 ENCOUNTER — PATIENT MESSAGE (OUTPATIENT)
Dept: INTERNAL MEDICINE | Facility: CLINIC | Age: 43
End: 2023-03-17
Payer: COMMERCIAL

## 2023-03-17 DIAGNOSIS — E28.2 PCOS (POLYCYSTIC OVARIAN SYNDROME): ICD-10-CM

## 2023-03-17 DIAGNOSIS — E66.01 CLASS 3 SEVERE OBESITY DUE TO EXCESS CALORIES WITH BODY MASS INDEX (BMI) OF 45.0 TO 49.9 IN ADULT, UNSPECIFIED WHETHER SERIOUS COMORBIDITY PRESENT: ICD-10-CM

## 2023-03-17 RX ORDER — SEMAGLUTIDE 0.25 MG/.5ML
0.25 INJECTION, SOLUTION SUBCUTANEOUS
Qty: 2 ML | Refills: 0 | Status: SHIPPED | OUTPATIENT
Start: 2023-03-17 | End: 2023-03-31 | Stop reason: SDUPTHER

## 2023-03-17 NOTE — TELEPHONE ENCOUNTER
Care Due:                  Date            Visit Type   Department     Provider  --------------------------------------------------------------------------------                                ESTABLISHED                              PATIENT -    [facility] INTERNAL  Last Visit: 03-      [facility]       Sapna Vásquez  Next Visit: None Scheduled  None         None Found                                                            Last  Test          Frequency    Reason                     Performed    Due Date  --------------------------------------------------------------------------------    HBA1C.......  6 months...  semaglutide,.............  02- 07-    [facility] Embedded Care Gaps. Reference number: [id number]. 3/17/2023   5:14:49 PM CDT

## 2023-03-20 ENCOUNTER — PATIENT MESSAGE (OUTPATIENT)
Dept: INTERNAL MEDICINE | Facility: CLINIC | Age: 43
End: 2023-03-20
Payer: COMMERCIAL

## 2023-03-21 ENCOUNTER — PATIENT MESSAGE (OUTPATIENT)
Dept: INTERNAL MEDICINE | Facility: CLINIC | Age: 43
End: 2023-03-21
Payer: COMMERCIAL

## 2023-03-23 ENCOUNTER — OFFICE VISIT (OUTPATIENT)
Dept: INTERNAL MEDICINE | Facility: CLINIC | Age: 43
End: 2023-03-23
Payer: COMMERCIAL

## 2023-03-23 VITALS
WEIGHT: 293 LBS | SYSTOLIC BLOOD PRESSURE: 110 MMHG | TEMPERATURE: 98 F | HEIGHT: 62 IN | HEART RATE: 89 BPM | OXYGEN SATURATION: 98 % | DIASTOLIC BLOOD PRESSURE: 80 MMHG | BODY MASS INDEX: 53.92 KG/M2

## 2023-03-23 DIAGNOSIS — R07.9 CHEST PAIN, UNSPECIFIED TYPE: ICD-10-CM

## 2023-03-23 DIAGNOSIS — F41.8 SITUATIONAL ANXIETY: Primary | ICD-10-CM

## 2023-03-23 DIAGNOSIS — L30.4 INTERTRIGO: ICD-10-CM

## 2023-03-23 PROCEDURE — 3079F PR MOST RECENT DIASTOLIC BLOOD PRESSURE 80-89 MM HG: ICD-10-PCS | Mod: CPTII,S$GLB,, | Performed by: HOSPITALIST

## 2023-03-23 PROCEDURE — 1159F MED LIST DOCD IN RCRD: CPT | Mod: CPTII,S$GLB,, | Performed by: HOSPITALIST

## 2023-03-23 PROCEDURE — 99999 PR PBB SHADOW E&M-EST. PATIENT-LVL V: CPT | Mod: PBBFAC,,, | Performed by: HOSPITALIST

## 2023-03-23 PROCEDURE — 1160F RVW MEDS BY RX/DR IN RCRD: CPT | Mod: CPTII,S$GLB,, | Performed by: HOSPITALIST

## 2023-03-23 PROCEDURE — 3008F BODY MASS INDEX DOCD: CPT | Mod: CPTII,S$GLB,, | Performed by: HOSPITALIST

## 2023-03-23 PROCEDURE — 93010 ELECTROCARDIOGRAM REPORT: CPT | Mod: S$GLB,,, | Performed by: INTERNAL MEDICINE

## 2023-03-23 PROCEDURE — 1160F PR REVIEW ALL MEDS BY PRESCRIBER/CLIN PHARMACIST DOCUMENTED: ICD-10-PCS | Mod: CPTII,S$GLB,, | Performed by: HOSPITALIST

## 2023-03-23 PROCEDURE — 3074F SYST BP LT 130 MM HG: CPT | Mod: CPTII,S$GLB,, | Performed by: HOSPITALIST

## 2023-03-23 PROCEDURE — 93010 EKG 12-LEAD: ICD-10-PCS | Mod: S$GLB,,, | Performed by: INTERNAL MEDICINE

## 2023-03-23 PROCEDURE — 3074F PR MOST RECENT SYSTOLIC BLOOD PRESSURE < 130 MM HG: ICD-10-PCS | Mod: CPTII,S$GLB,, | Performed by: HOSPITALIST

## 2023-03-23 PROCEDURE — 3008F PR BODY MASS INDEX (BMI) DOCUMENTED: ICD-10-PCS | Mod: CPTII,S$GLB,, | Performed by: HOSPITALIST

## 2023-03-23 PROCEDURE — 99214 OFFICE O/P EST MOD 30 MIN: CPT | Mod: S$GLB,,, | Performed by: HOSPITALIST

## 2023-03-23 PROCEDURE — 1159F PR MEDICATION LIST DOCUMENTED IN MEDICAL RECORD: ICD-10-PCS | Mod: CPTII,S$GLB,, | Performed by: HOSPITALIST

## 2023-03-23 PROCEDURE — 93005 EKG 12-LEAD: ICD-10-PCS | Mod: S$GLB,,, | Performed by: HOSPITALIST

## 2023-03-23 PROCEDURE — 99999 PR PBB SHADOW E&M-EST. PATIENT-LVL V: ICD-10-PCS | Mod: PBBFAC,,, | Performed by: HOSPITALIST

## 2023-03-23 PROCEDURE — 3079F DIAST BP 80-89 MM HG: CPT | Mod: CPTII,S$GLB,, | Performed by: HOSPITALIST

## 2023-03-23 PROCEDURE — 93005 ELECTROCARDIOGRAM TRACING: CPT | Mod: S$GLB,,, | Performed by: HOSPITALIST

## 2023-03-23 PROCEDURE — 99214 PR OFFICE/OUTPT VISIT, EST, LEVL IV, 30-39 MIN: ICD-10-PCS | Mod: S$GLB,,, | Performed by: HOSPITALIST

## 2023-03-23 RX ORDER — MONTELUKAST SODIUM 10 MG/1
TABLET ORAL
COMMUNITY
Start: 2023-02-20

## 2023-03-23 RX ORDER — TACROLIMUS 1 MG/1
CAPSULE ORAL
COMMUNITY
Start: 2022-12-23

## 2023-03-23 RX ORDER — COVID-19 MOLECULAR TEST ASSAY
KIT MISCELLANEOUS
COMMUNITY
Start: 2022-10-23

## 2023-03-23 RX ORDER — LORAZEPAM 1 MG/1
1 TABLET ORAL NIGHTLY PRN
Qty: 20 TABLET | Refills: 0 | Status: SHIPPED | OUTPATIENT
Start: 2023-03-23

## 2023-03-23 RX ORDER — METFORMIN HYDROCHLORIDE 500 MG/1
TABLET, EXTENDED RELEASE ORAL
COMMUNITY
Start: 2023-02-08

## 2023-03-23 NOTE — PROGRESS NOTES
Subjective:     @Patient ID: Tia Chung is a 42 y.o. female.    Chief Complaint: Possible Anxiety, Chest Pain, Small rash under right breast, and Medication Refill    HPI  43 yo F presents with c/o chest pain intermittently for the past few weeks. None in the past 2 days.  Reports that pain is anxiety/stress related.  Reports that it is sharp or pressure in the center of her chest.  No radiation.  Reports that it is triggered by situation.  Reports that she has been undergoing a lot of stress as she will have to put her dog down who has been having multiple seizures.  Reports that has caused her to feel very anxious.  Reports that also the anniversary of her mother's death and her son's death is coming up soon.   Reports she had similar his symptoms years ago when her son  and was prescribed lorazepam by her OBGYN.  States that she uses it only rarely.  Also notes that she has been having a small rash under her right breast.      Review of Systems   Constitutional:  Negative for chills and fever.   Cardiovascular:  Positive for chest pain.   Skin:  Positive for rash.   Psychiatric/Behavioral:  Negative for agitation and confusion. The patient is nervous/anxious.    Past medical history, surgical history, and family medical history reviewed and updated as appropriate.    Medications and allergies reviewed.     Objective:     There were no vitals filed for this visit.  There is no height or weight on file to calculate BMI.  Physical Exam  Constitutional:       Appearance: Normal appearance.   HENT:      Head: Normocephalic and atraumatic.   Eyes:      General:         Right eye: No discharge.         Left eye: No discharge.      Conjunctiva/sclera: Conjunctivae normal.   Cardiovascular:      Rate and Rhythm: Normal rate and regular rhythm.      Heart sounds: No murmur heard.  Pulmonary:      Effort: Pulmonary effort is normal.      Breath sounds: Normal breath sounds.   Musculoskeletal:          General: Normal range of motion.      Cervical back: Normal range of motion and neck supple.      Right lower leg: No edema.      Left lower leg: No edema.   Skin:     General: Skin is warm and dry.      Comments: +  hyperpigmentation of R breast fold   Neurological:      Mental Status: She is alert and oriented to person, place, and time.   Psychiatric:         Mood and Affect: Mood is anxious. Affect is tearful.         Behavior: Behavior normal.       Lab Results   Component Value Date    WBC 8.07 12/12/2022    HGB 11.3 (L) 12/12/2022    HCT 36.7 (L) 12/12/2022     12/12/2022    CHOL 217 (H) 02/01/2022    TRIG 68 02/01/2022    HDL 69 02/01/2022    ALT 15 12/12/2022    AST 17 12/12/2022     12/12/2022    K 4.1 12/12/2022     12/12/2022    CREATININE 0.7 12/12/2022    BUN 13 12/12/2022    CO2 24 12/12/2022    TSH 1.853 12/12/2022    GLUF 90 09/23/2021    HGBA1C 5.3 02/01/2022       Assessment:     1. Situational anxiety    2. Chest pain, unspecified type    3. Intertrigo      Plan:   Tia was seen today for possible anxiety, chest pain, small rash under right breast and medication refill.    Diagnoses and all orders for this visit:    Situational anxiety  -     LORazepam (ATIVAN) 1 MG tablet; Take 1 tablet (1 mg total) by mouth nightly as needed for Anxiety.    Chest pain, unspecified type  - Likely due to anxiety but will check EKG   -     IN OFFICE EKG 12-LEAD (to [place])    Intertrigo  - recommend OTC medicated powder gold bond. Keep area dry       Sapna Vásquez MD  Internal Medicine    3/23/2023

## 2023-03-30 ENCOUNTER — PATIENT MESSAGE (OUTPATIENT)
Dept: INTERNAL MEDICINE | Facility: CLINIC | Age: 43
End: 2023-03-30
Payer: COMMERCIAL

## 2023-03-30 DIAGNOSIS — E28.2 PCOS (POLYCYSTIC OVARIAN SYNDROME): ICD-10-CM

## 2023-03-30 DIAGNOSIS — E66.01 CLASS 3 SEVERE OBESITY DUE TO EXCESS CALORIES WITH BODY MASS INDEX (BMI) OF 45.0 TO 49.9 IN ADULT, UNSPECIFIED WHETHER SERIOUS COMORBIDITY PRESENT: ICD-10-CM

## 2023-03-31 RX ORDER — CLOTRIMAZOLE 1 %
CREAM (GRAM) TOPICAL 2 TIMES DAILY
Qty: 85 G | Refills: 1 | Status: SHIPPED | OUTPATIENT
Start: 2023-03-31 | End: 2023-05-29 | Stop reason: SDUPTHER

## 2023-03-31 RX ORDER — SEMAGLUTIDE 0.25 MG/.5ML
0.25 INJECTION, SOLUTION SUBCUTANEOUS
Qty: 2 ML | Refills: 0 | Status: SHIPPED | OUTPATIENT
Start: 2023-03-31 | End: 2023-03-31

## 2023-03-31 RX ORDER — SEMAGLUTIDE 0.25 MG/.5ML
0.25 INJECTION, SOLUTION SUBCUTANEOUS
Qty: 2 ML | Refills: 0 | Status: SHIPPED | OUTPATIENT
Start: 2023-03-31 | End: 2023-05-05 | Stop reason: DRUGHIGH

## 2023-03-31 NOTE — TELEPHONE ENCOUNTER
Can you please send the Wegovy Rx to [facility] for [facility].    Also the patient states that the Gold Bond is not helping.  Please see [employer] messages.

## 2023-04-07 ENCOUNTER — TELEPHONE (OUTPATIENT)
Dept: PHARMACY | Facility: CLINIC | Age: 43
End: 2023-04-07
Payer: COMMERCIAL

## 2023-04-10 NOTE — TELEPHONE ENCOUNTER
PA APPROVED;    The prior authorization for Tia Dottie Chung's Wegovy prescription has been APPROVED FROM 4/4/2023 TO 10/4/2023 with copayment of $663.59.

## 2023-05-05 ENCOUNTER — PATIENT MESSAGE (OUTPATIENT)
Dept: INTERNAL MEDICINE | Facility: CLINIC | Age: 43
End: 2023-05-05
Payer: COMMERCIAL

## 2023-05-05 DIAGNOSIS — E66.01 CLASS 3 SEVERE OBESITY DUE TO EXCESS CALORIES WITH BODY MASS INDEX (BMI) OF 45.0 TO 49.9 IN ADULT, UNSPECIFIED WHETHER SERIOUS COMORBIDITY PRESENT: ICD-10-CM

## 2023-05-05 DIAGNOSIS — E28.2 PCOS (POLYCYSTIC OVARIAN SYNDROME): ICD-10-CM

## 2023-05-05 RX ORDER — SEMAGLUTIDE 0.5 MG/.5ML
0.5 INJECTION, SOLUTION SUBCUTANEOUS
Qty: 2 ML | Refills: 0 | Status: SHIPPED | OUTPATIENT
Start: 2023-05-05 | End: 2023-05-30 | Stop reason: DRUGHIGH

## 2023-05-05 RX ORDER — SEMAGLUTIDE 0.5 MG/.5ML
0.5 INJECTION, SOLUTION SUBCUTANEOUS
Qty: 2 ML | Refills: 0 | Status: SHIPPED | OUTPATIENT
Start: 2023-05-05 | End: 2023-05-05 | Stop reason: SDUPTHER

## 2023-05-05 RX ORDER — SEMAGLUTIDE 0.25 MG/.5ML
INJECTION, SOLUTION SUBCUTANEOUS
Qty: 2 ML | Refills: 0 | Status: CANCELLED | OUTPATIENT
Start: 2023-05-05

## 2023-05-05 NOTE — TELEPHONE ENCOUNTER
No care due was identified.  [facility] Embedded Care Due Messages. Reference number: [id number].   5/05/2023 10:50:09 AM CDT

## 2023-05-05 NOTE — TELEPHONE ENCOUNTER
No care due was identified.  Health [facility] Embedded Care Due Messages. Reference number: [id number].   5/05/2023 11:32:35 AM CDT

## 2023-05-29 DIAGNOSIS — H40.32X3 GLAUCOMA OF LEFT EYE ASSOCIATED WITH OCULAR TRAUMA, SEVERE STAGE: ICD-10-CM

## 2023-05-29 DIAGNOSIS — H40.31X2 GLAUCOMA OF RIGHT EYE ASSOCIATED WITH OCULAR TRAUMA, MODERATE STAGE: ICD-10-CM

## 2023-05-29 DIAGNOSIS — E66.01 CLASS 3 SEVERE OBESITY DUE TO EXCESS CALORIES WITH SERIOUS COMORBIDITY AND BODY MASS INDEX (BMI) OF 45.0 TO 49.9 IN ADULT: Primary | ICD-10-CM

## 2023-05-29 RX ORDER — DORZOLAMIDE HYDROCHLORIDE AND TIMOLOL MALEATE 20; 5 MG/ML; MG/ML
1 SOLUTION/ DROPS OPHTHALMIC 2 TIMES DAILY
Qty: 10 ML | Refills: 3 | Status: SHIPPED | OUTPATIENT
Start: 2023-05-29 | End: 2023-08-07 | Stop reason: SDUPTHER

## 2023-05-29 RX ORDER — SEMAGLUTIDE 0.5 MG/.5ML
0.5 INJECTION, SOLUTION SUBCUTANEOUS
Qty: 2 ML | Refills: 0 | Status: CANCELLED | OUTPATIENT
Start: 2023-05-29

## 2023-05-29 RX ORDER — BRIMONIDINE TARTRATE 2 MG/ML
1 SOLUTION/ DROPS OPHTHALMIC 3 TIMES DAILY
Qty: 10 ML | Refills: 3 | Status: SHIPPED | OUTPATIENT
Start: 2023-05-29 | End: 2023-08-07 | Stop reason: SDUPTHER

## 2023-05-29 NOTE — TELEPHONE ENCOUNTER
Care Due:                  Date            Visit Type   Department     Provider  --------------------------------------------------------------------------------                                EP -                              PRIMARY      MET INTERNAL  Last Visit: 03-      CARE ([facility])   MEDICINE       Sapna Vásquez  Next Visit: None Scheduled  None         None Found                                                            Last  Test          Frequency    Reason                     Performed    Due Date  --------------------------------------------------------------------------------    HBA1C.......  6 months...  semaglutide,.............  02- 07-    [facility] Embedded Care Due Messages. Reference number: [id number].   5/29/2023 1:58:38 PM CDT

## 2023-05-30 RX ORDER — SCOLOPAMINE TRANSDERMAL SYSTEM 1 MG/1
1 PATCH, EXTENDED RELEASE TRANSDERMAL
Qty: 5 PATCH | Refills: 0 | Status: SHIPPED | OUTPATIENT
Start: 2023-05-30 | End: 2024-01-08 | Stop reason: SDUPTHER

## 2023-05-30 RX ORDER — SEMAGLUTIDE 1 MG/.5ML
1 INJECTION, SOLUTION SUBCUTANEOUS
Qty: 2 ML | Refills: 0 | Status: SHIPPED | OUTPATIENT
Start: 2023-05-30 | End: 2023-07-07 | Stop reason: DRUGHIGH

## 2023-05-30 RX ORDER — CLOTRIMAZOLE 1 %
CREAM (GRAM) TOPICAL 2 TIMES DAILY
Qty: 85 G | Refills: 1 | Status: SHIPPED | OUTPATIENT
Start: 2023-05-30 | End: 2024-01-08 | Stop reason: SDUPTHER

## 2023-06-06 RX ORDER — LATANOPROST 50 UG/ML
1 SOLUTION/ DROPS OPHTHALMIC DAILY
Qty: 3 ML | Refills: 0 | Status: SHIPPED | OUTPATIENT
Start: 2023-06-06 | End: 2023-08-07 | Stop reason: SDUPTHER

## 2023-07-07 ENCOUNTER — PATIENT MESSAGE (OUTPATIENT)
Dept: INTERNAL MEDICINE | Facility: CLINIC | Age: 43
End: 2023-07-07
Payer: COMMERCIAL

## 2023-07-07 DIAGNOSIS — E66.01 CLASS 3 SEVERE OBESITY DUE TO EXCESS CALORIES WITH BODY MASS INDEX (BMI) OF 45.0 TO 49.9 IN ADULT, UNSPECIFIED WHETHER SERIOUS COMORBIDITY PRESENT: Primary | ICD-10-CM

## 2023-07-07 RX ORDER — SEMAGLUTIDE 1.7 MG/.75ML
1.7 INJECTION, SOLUTION SUBCUTANEOUS
Qty: 3 ML | Refills: 0 | Status: SHIPPED | OUTPATIENT
Start: 2023-07-07 | End: 2023-08-08 | Stop reason: SDUPTHER

## 2023-08-07 ENCOUNTER — PATIENT MESSAGE (OUTPATIENT)
Dept: INTERNAL MEDICINE | Facility: CLINIC | Age: 43
End: 2023-08-07
Payer: COMMERCIAL

## 2023-08-07 DIAGNOSIS — E66.01 CLASS 3 SEVERE OBESITY DUE TO EXCESS CALORIES WITH BODY MASS INDEX (BMI) OF 45.0 TO 49.9 IN ADULT, UNSPECIFIED WHETHER SERIOUS COMORBIDITY PRESENT: ICD-10-CM

## 2023-08-07 DIAGNOSIS — H40.31X2 GLAUCOMA OF RIGHT EYE ASSOCIATED WITH OCULAR TRAUMA, MODERATE STAGE: ICD-10-CM

## 2023-08-07 DIAGNOSIS — H40.32X3 GLAUCOMA OF LEFT EYE ASSOCIATED WITH OCULAR TRAUMA, SEVERE STAGE: ICD-10-CM

## 2023-08-07 RX ORDER — SEMAGLUTIDE 1.7 MG/.75ML
1.7 INJECTION, SOLUTION SUBCUTANEOUS
Qty: 3 ML | Refills: 0 | Status: CANCELLED | OUTPATIENT
Start: 2023-08-07

## 2023-08-08 RX ORDER — SEMAGLUTIDE 1.7 MG/.75ML
1.7 INJECTION, SOLUTION SUBCUTANEOUS
Qty: 3 ML | Refills: 5 | Status: SHIPPED | OUTPATIENT
Start: 2023-08-08 | End: 2023-08-17 | Stop reason: SDUPTHER

## 2023-08-08 RX ORDER — DORZOLAMIDE HYDROCHLORIDE AND TIMOLOL MALEATE 20; 5 MG/ML; MG/ML
1 SOLUTION/ DROPS OPHTHALMIC 2 TIMES DAILY
Qty: 10 ML | Refills: 3 | Status: SHIPPED | OUTPATIENT
Start: 2023-08-08 | End: 2024-01-08 | Stop reason: SDUPTHER

## 2023-08-08 RX ORDER — BRIMONIDINE TARTRATE 2 MG/ML
1 SOLUTION/ DROPS OPHTHALMIC 3 TIMES DAILY
Qty: 10 ML | Refills: 3 | Status: SHIPPED | OUTPATIENT
Start: 2023-08-08 | End: 2024-01-08 | Stop reason: SDUPTHER

## 2023-08-08 NOTE — TELEPHONE ENCOUNTER
Care Due:                  Date            Visit Type   Department     Provider  --------------------------------------------------------------------------------                                EP -                              PRIMARY      MET INTERNAL  Last Visit: 03-      CARE ([facility])   MEDICINE       Sapna Vásquez  Next Visit: None Scheduled  None         None Found                                                            Last  Test          Frequency    Reason                     Performed    Due Date  --------------------------------------------------------------------------------    HBA1C.......  6 months...  semaglutide,.............  02- 07-    [facility] Embedded Care Due Messages. Reference number: [id number].   8/07/2023 8:22:38 PM CDT

## 2023-08-08 NOTE — TELEPHONE ENCOUNTER
Last ordered 7/7/23  Last visit 3/23/23    The patient would like to stay on 1.7 of the Wegovy. She states that the medication is working at that dosage.

## 2023-08-08 NOTE — TELEPHONE ENCOUNTER
No care due was identified.  [facility] Embedded Care Due Messages. Reference number: [id number].   8/08/2023 10:27:54 AM CDT

## 2023-08-09 RX ORDER — FAMOTIDINE 20 MG/1
TABLET, FILM COATED ORAL
Qty: 90 TABLET | Refills: 3 | Status: SHIPPED | OUTPATIENT
Start: 2023-08-09 | End: 2024-01-08 | Stop reason: SDUPTHER

## 2023-08-17 ENCOUNTER — PATIENT MESSAGE (OUTPATIENT)
Dept: INTERNAL MEDICINE | Facility: CLINIC | Age: 43
End: 2023-08-17
Payer: COMMERCIAL

## 2023-08-17 DIAGNOSIS — E66.01 CLASS 3 SEVERE OBESITY DUE TO EXCESS CALORIES WITH BODY MASS INDEX (BMI) OF 45.0 TO 49.9 IN ADULT, UNSPECIFIED WHETHER SERIOUS COMORBIDITY PRESENT: ICD-10-CM

## 2023-08-17 RX ORDER — SEMAGLUTIDE 1.7 MG/.75ML
1.7 INJECTION, SOLUTION SUBCUTANEOUS
Qty: 3 ML | Refills: 5 | Status: SHIPPED | OUTPATIENT
Start: 2023-08-17 | End: 2023-09-06 | Stop reason: SDUPTHER

## 2023-08-17 NOTE — TELEPHONE ENCOUNTER
No care due was identified.  [facility] Embedded Care Due Messages. Reference number: [id number].   8/17/2023 10:45:40 AM CDT

## 2023-08-22 RX ORDER — LATANOPROST 50 UG/ML
1 SOLUTION/ DROPS OPHTHALMIC DAILY
Qty: 3 ML | Refills: 0 | Status: SHIPPED | OUTPATIENT
Start: 2023-08-22 | End: 2023-09-11

## 2023-09-05 ENCOUNTER — PATIENT MESSAGE (OUTPATIENT)
Dept: INTERNAL MEDICINE | Facility: CLINIC | Age: 43
End: 2023-09-05
Payer: COMMERCIAL

## 2023-09-05 DIAGNOSIS — E66.01 CLASS 3 SEVERE OBESITY DUE TO EXCESS CALORIES WITH BODY MASS INDEX (BMI) OF 45.0 TO 49.9 IN ADULT, UNSPECIFIED WHETHER SERIOUS COMORBIDITY PRESENT: ICD-10-CM

## 2023-09-05 NOTE — TELEPHONE ENCOUNTER
Pt states she is having issues getting wegovy refilled .     Pt is asking for an rx to be sent to [facility] in [facility].     Pharmacy changed

## 2023-09-06 RX ORDER — SEMAGLUTIDE 1.7 MG/.75ML
1.7 INJECTION, SOLUTION SUBCUTANEOUS
Qty: 3 ML | Refills: 5 | Status: SHIPPED | OUTPATIENT
Start: 2023-09-06 | End: 2023-09-22

## 2023-09-10 DIAGNOSIS — H40.32X3 GLAUCOMA OF LEFT EYE ASSOCIATED WITH OCULAR TRAUMA, SEVERE STAGE: ICD-10-CM

## 2023-09-11 RX ORDER — LATANOPROST 50 UG/ML
1 SOLUTION/ DROPS OPHTHALMIC
Qty: 7.5 ML | Refills: 6 | Status: SHIPPED | OUTPATIENT
Start: 2023-09-11 | End: 2024-02-24 | Stop reason: SDUPTHER

## 2023-09-12 DIAGNOSIS — E66.01 CLASS 3 SEVERE OBESITY DUE TO EXCESS CALORIES WITH BODY MASS INDEX (BMI) OF 45.0 TO 49.9 IN ADULT, UNSPECIFIED WHETHER SERIOUS COMORBIDITY PRESENT: ICD-10-CM

## 2023-09-12 RX ORDER — SEMAGLUTIDE 1.7 MG/.75ML
1.7 INJECTION, SOLUTION SUBCUTANEOUS
Qty: 3 ML | Refills: 5 | Status: CANCELLED | OUTPATIENT
Start: 2023-09-12

## 2023-09-12 NOTE — TELEPHONE ENCOUNTER
No care due was identified.  [facility] Embedded Care Due Messages. Reference number: [id number].   9/12/2023 11:38:20 AM CDT

## 2023-09-18 ENCOUNTER — PATIENT MESSAGE (OUTPATIENT)
Dept: INTERNAL MEDICINE | Facility: CLINIC | Age: 43
End: 2023-09-18
Payer: COMMERCIAL

## 2023-09-18 DIAGNOSIS — E66.01 CLASS 3 SEVERE OBESITY DUE TO EXCESS CALORIES WITH BODY MASS INDEX (BMI) OF 45.0 TO 49.9 IN ADULT, UNSPECIFIED WHETHER SERIOUS COMORBIDITY PRESENT: ICD-10-CM

## 2023-09-18 RX ORDER — SEMAGLUTIDE 1.7 MG/.75ML
1.7 INJECTION, SOLUTION SUBCUTANEOUS
Qty: 3 ML | Refills: 5 | Status: CANCELLED | OUTPATIENT
Start: 2023-09-18

## 2023-09-18 NOTE — TELEPHONE ENCOUNTER
No care due was identified.  [facility] Embedded Care Due Messages. Reference number: [id number].   9/18/2023 9:10:12 AM CDT

## 2023-09-22 ENCOUNTER — PATIENT MESSAGE (OUTPATIENT)
Dept: INTERNAL MEDICINE | Facility: CLINIC | Age: 43
End: 2023-09-22
Payer: COMMERCIAL

## 2023-09-22 DIAGNOSIS — E66.01 CLASS 3 SEVERE OBESITY DUE TO EXCESS CALORIES WITH BODY MASS INDEX (BMI) OF 45.0 TO 49.9 IN ADULT, UNSPECIFIED WHETHER SERIOUS COMORBIDITY PRESENT: ICD-10-CM

## 2023-09-22 RX ORDER — SEMAGLUTIDE 1.7 MG/.75ML
1.7 INJECTION, SOLUTION SUBCUTANEOUS
Qty: 9 ML | Refills: 0 | Status: SHIPPED | OUTPATIENT
Start: 2023-09-22

## 2023-12-21 DIAGNOSIS — Z12.31 OTHER SCREENING MAMMOGRAM: ICD-10-CM

## 2024-01-08 DIAGNOSIS — H40.32X3 GLAUCOMA OF LEFT EYE ASSOCIATED WITH OCULAR TRAUMA, SEVERE STAGE: ICD-10-CM

## 2024-01-08 DIAGNOSIS — H40.31X2 GLAUCOMA OF RIGHT EYE ASSOCIATED WITH OCULAR TRAUMA, MODERATE STAGE: ICD-10-CM

## 2024-01-08 RX ORDER — BRIMONIDINE TARTRATE 2 MG/ML
1 SOLUTION/ DROPS OPHTHALMIC 3 TIMES DAILY
Qty: 45 ML | Refills: 3 | Status: SHIPPED | OUTPATIENT
Start: 2024-01-08 | End: 2025-01-07

## 2024-01-08 RX ORDER — SCOLOPAMINE TRANSDERMAL SYSTEM 1 MG/1
1 PATCH, EXTENDED RELEASE TRANSDERMAL
Qty: 5 PATCH | Refills: 0 | Status: SHIPPED | OUTPATIENT
Start: 2024-01-08

## 2024-01-08 RX ORDER — DORZOLAMIDE HYDROCHLORIDE AND TIMOLOL MALEATE 20; 5 MG/ML; MG/ML
1 SOLUTION/ DROPS OPHTHALMIC 2 TIMES DAILY
Qty: 30 ML | Refills: 3 | Status: SHIPPED | OUTPATIENT
Start: 2024-01-08

## 2024-01-08 RX ORDER — CLOTRIMAZOLE 1 %
CREAM (GRAM) TOPICAL 2 TIMES DAILY
Qty: 85 G | Refills: 1 | Status: SHIPPED | OUTPATIENT
Start: 2024-01-08

## 2024-01-08 RX ORDER — FAMOTIDINE 20 MG/1
TABLET, FILM COATED ORAL
Qty: 90 TABLET | Refills: 1 | Status: SHIPPED | OUTPATIENT
Start: 2024-01-08

## 2024-01-08 NOTE — TELEPHONE ENCOUNTER
Care Due:                  Date            Visit Type   Department     Provider  --------------------------------------------------------------------------------                                EP -                              PRIMARY      MET INTERNAL  Last Visit: 03-      CARE ([facility])   MEDICINE       Sapna Vásquez  Next Visit: None Scheduled  None         None Found                                                            Last  Test          Frequency    Reason                     Performed    Due Date  --------------------------------------------------------------------------------    HBA1C.......  6 months...  semaglutide,.............  02- 07-    [facility] Embedded Care Due Messages. Reference number: [id number].   1/08/2024 8:56:30 AM CST

## 2024-01-31 ENCOUNTER — PATIENT MESSAGE (OUTPATIENT)
Dept: OBSTETRICS AND GYNECOLOGY | Facility: CLINIC | Age: 44
End: 2024-01-31
Payer: COMMERCIAL

## 2024-02-24 DIAGNOSIS — H40.32X3 GLAUCOMA OF LEFT EYE ASSOCIATED WITH OCULAR TRAUMA, SEVERE STAGE: ICD-10-CM

## 2024-02-26 RX ORDER — LATANOPROST 50 UG/ML
1 SOLUTION/ DROPS OPHTHALMIC DAILY
Qty: 7.5 ML | Refills: 6 | Status: SHIPPED | OUTPATIENT
Start: 2024-02-26

## 2024-04-26 ENCOUNTER — PATIENT MESSAGE (OUTPATIENT)
Dept: ADMINISTRATIVE | Facility: HOSPITAL | Age: 44
End: 2024-04-26
Payer: COMMERCIAL

## 2024-04-26 ENCOUNTER — PATIENT OUTREACH (OUTPATIENT)
Dept: ADMINISTRATIVE | Facility: HOSPITAL | Age: 44
End: 2024-04-26
Payer: COMMERCIAL

## 2024-04-30 ENCOUNTER — PATIENT OUTREACH (OUTPATIENT)
Dept: ADMINISTRATIVE | Facility: HOSPITAL | Age: 44
End: 2024-04-30
Payer: COMMERCIAL

## 2025-02-16 DIAGNOSIS — H40.31X2 GLAUCOMA OF RIGHT EYE ASSOCIATED WITH OCULAR TRAUMA, MODERATE STAGE: ICD-10-CM

## 2025-02-16 DIAGNOSIS — H40.32X3 GLAUCOMA OF LEFT EYE ASSOCIATED WITH OCULAR TRAUMA, SEVERE STAGE: ICD-10-CM

## 2025-02-17 RX ORDER — BRIMONIDINE TARTRATE 2 MG/ML
1 SOLUTION/ DROPS OPHTHALMIC 3 TIMES DAILY
Qty: 30 ML | Refills: 11 | Status: SHIPPED | OUTPATIENT
Start: 2025-02-17

## (undated) DEVICE — ELECTRODE REM PLYHSV RETURN 9

## (undated) DEVICE — SKIN MARKER DEVON 160

## (undated) DEVICE — GOWN POLY REINF BRTH SLV XL

## (undated) DEVICE — NDL RETROBULAR AKTKINSON 23G

## (undated) DEVICE — NDL HYPO A BEVEL 30X1/2

## (undated) DEVICE — GLOVE BIOGEL SKINSENSE PI 7.5

## (undated) DEVICE — SUT ETHILON 10/0 CS160-6

## (undated) DEVICE — SHIELD EYE PLASTIC 3100G

## (undated) DEVICE — CASSETTE INFINITI

## (undated) DEVICE — SOL BETADINE 5%

## (undated) DEVICE — GOWN SURGICAL X-LARGE

## (undated) DEVICE — APPLICATOR CHLORAPREP ORN 26ML

## (undated) DEVICE — DRAPE LAP T SHT W/ INSTR PAD

## (undated) DEVICE — TANK GAS ISPAN 125GR FOR SF6

## (undated) DEVICE — Device

## (undated) DEVICE — PUNCH BARRON VACUUM 8.0MM

## (undated) DEVICE — ADHESIVE DERMABOND ADVANCED

## (undated) DEVICE — SUT D SPECIAL VICRYL 2-0

## (undated) DEVICE — TRAY MINOR GEN SURG OMC

## (undated) DEVICE — DRESSING TEGADERM 4.4X5IN

## (undated) DEVICE — SPONGE GAUZE 16PLY 4X4

## (undated) DEVICE — TIP YANKAUERS BULB NO VENT

## (undated) DEVICE — SUT VICRYL PLUS 3-0 SH 18IN

## (undated) DEVICE — SUT MCRYL PLUS 4-0 PS2 27IN

## (undated) DEVICE — SYR SLIP TIP 1CC